# Patient Record
Sex: MALE | Race: WHITE | NOT HISPANIC OR LATINO | Employment: OTHER | URBAN - METROPOLITAN AREA
[De-identification: names, ages, dates, MRNs, and addresses within clinical notes are randomized per-mention and may not be internally consistent; named-entity substitution may affect disease eponyms.]

---

## 2017-04-25 ENCOUNTER — LAB (OUTPATIENT)
Dept: LAB | Facility: HOSPITAL | Age: 82
End: 2017-04-25

## 2017-04-25 ENCOUNTER — TRANSCRIBE ORDERS (OUTPATIENT)
Dept: LAB | Facility: HOSPITAL | Age: 82
End: 2017-04-25

## 2017-04-25 DIAGNOSIS — N18.30 CHRONIC KIDNEY DISEASE, STAGE III (MODERATE) (HCC): Primary | ICD-10-CM

## 2017-04-25 DIAGNOSIS — N18.30 CHRONIC KIDNEY DISEASE, STAGE III (MODERATE) (HCC): ICD-10-CM

## 2017-04-25 LAB
25(OH)D3 SERPL-MCNC: 39.4 NG/ML
ALBUMIN SERPL-MCNC: 4.4 G/DL (ref 3.2–4.8)
ANION GAP SERPL CALCULATED.3IONS-SCNC: 5 MMOL/L (ref 3–11)
BACTERIA UR QL AUTO: ABNORMAL /HPF
BILIRUB UR QL STRIP: NEGATIVE
BUN BLD-MCNC: 41 MG/DL (ref 9–23)
BUN/CREAT SERPL: 25.6 (ref 7–25)
CALCIUM SPEC-SCNC: 10.1 MG/DL (ref 8.7–10.4)
CHLORIDE SERPL-SCNC: 101 MMOL/L (ref 99–109)
CLARITY UR: CLEAR
CO2 SERPL-SCNC: 31 MMOL/L (ref 20–31)
COLOR UR: YELLOW
CREAT BLD-MCNC: 1.6 MG/DL (ref 0.6–1.3)
CREAT UR-MCNC: 74.8 MG/DL
GFR SERPL CREATININE-BSD FRML MDRD: 42 ML/MIN/1.73
GLUCOSE BLD-MCNC: 108 MG/DL (ref 70–100)
GLUCOSE UR STRIP-MCNC: NEGATIVE MG/DL
HGB UR QL STRIP.AUTO: ABNORMAL
HYALINE CASTS UR QL AUTO: ABNORMAL /LPF
KETONES UR QL STRIP: NEGATIVE
LEUKOCYTE ESTERASE UR QL STRIP.AUTO: NEGATIVE
NITRITE UR QL STRIP: NEGATIVE
PH UR STRIP.AUTO: <=5 [PH] (ref 5–8)
PHOSPHATE SERPL-MCNC: 3.6 MG/DL (ref 2.4–5.1)
POTASSIUM BLD-SCNC: 4.7 MMOL/L (ref 3.5–5.5)
PROT UR QL STRIP: ABNORMAL
PROT UR-MCNC: 100 MG/DL (ref 1–14)
RBC # UR: ABNORMAL /HPF
REF LAB TEST METHOD: ABNORMAL
SODIUM BLD-SCNC: 137 MMOL/L (ref 132–146)
SP GR UR STRIP: 1.01 (ref 1–1.03)
SQUAMOUS #/AREA URNS HPF: ABNORMAL /HPF
UROBILINOGEN UR QL STRIP: ABNORMAL
WBC UR QL AUTO: ABNORMAL /HPF

## 2017-04-25 PROCEDURE — 84156 ASSAY OF PROTEIN URINE: CPT | Performed by: INTERNAL MEDICINE

## 2017-04-25 PROCEDURE — 36415 COLL VENOUS BLD VENIPUNCTURE: CPT | Performed by: INTERNAL MEDICINE

## 2017-04-25 PROCEDURE — 80069 RENAL FUNCTION PANEL: CPT | Performed by: INTERNAL MEDICINE

## 2017-04-25 PROCEDURE — 82306 VITAMIN D 25 HYDROXY: CPT | Performed by: INTERNAL MEDICINE

## 2017-04-25 PROCEDURE — 83970 ASSAY OF PARATHORMONE: CPT | Performed by: INTERNAL MEDICINE

## 2017-04-25 PROCEDURE — 82570 ASSAY OF URINE CREATININE: CPT | Performed by: INTERNAL MEDICINE

## 2017-04-25 PROCEDURE — 81001 URINALYSIS AUTO W/SCOPE: CPT | Performed by: INTERNAL MEDICINE

## 2017-04-26 LAB — PTH-INTACT SERPL-MCNC: 51 PG/ML (ref 15–65)

## 2017-10-24 ENCOUNTER — LAB (OUTPATIENT)
Dept: LAB | Facility: HOSPITAL | Age: 82
End: 2017-10-24

## 2017-10-24 ENCOUNTER — TRANSCRIBE ORDERS (OUTPATIENT)
Dept: LAB | Facility: HOSPITAL | Age: 82
End: 2017-10-24

## 2017-10-24 DIAGNOSIS — E55.9 VITAMIN D DEFICIENCY: ICD-10-CM

## 2017-10-24 DIAGNOSIS — E55.9 VITAMIN D DEFICIENCY: Primary | ICD-10-CM

## 2017-10-24 LAB
ALBUMIN SERPL-MCNC: 4.3 G/DL (ref 3.2–4.8)
ANION GAP SERPL CALCULATED.3IONS-SCNC: 7 MMOL/L (ref 3–11)
BASOPHILS # BLD AUTO: 0.03 10*3/MM3 (ref 0–0.2)
BASOPHILS NFR BLD AUTO: 0.5 % (ref 0–1)
BUN BLD-MCNC: 27 MG/DL (ref 9–23)
BUN/CREAT SERPL: 18 (ref 7–25)
CALCIUM SPEC-SCNC: 9.7 MG/DL (ref 8.7–10.4)
CHLORIDE SERPL-SCNC: 102 MMOL/L (ref 99–109)
CO2 SERPL-SCNC: 27 MMOL/L (ref 20–31)
CREAT BLD-MCNC: 1.5 MG/DL (ref 0.6–1.3)
CREAT UR-MCNC: 88.1 MG/DL
DEPRECATED RDW RBC AUTO: 51.6 FL (ref 37–54)
EOSINOPHIL # BLD AUTO: 0.34 10*3/MM3 (ref 0–0.3)
EOSINOPHIL NFR BLD AUTO: 5.6 % (ref 0–3)
ERYTHROCYTE [DISTWIDTH] IN BLOOD BY AUTOMATED COUNT: 15.2 % (ref 11.3–14.5)
GFR SERPL CREATININE-BSD FRML MDRD: 45 ML/MIN/1.73
GLUCOSE BLD-MCNC: 138 MG/DL (ref 70–100)
HCT VFR BLD AUTO: 43.8 % (ref 38.9–50.9)
HGB BLD-MCNC: 14.5 G/DL (ref 13.1–17.5)
IMM GRANULOCYTES # BLD: 0.03 10*3/MM3 (ref 0–0.03)
IMM GRANULOCYTES NFR BLD: 0.5 % (ref 0–0.6)
LYMPHOCYTES # BLD AUTO: 0.86 10*3/MM3 (ref 0.6–4.8)
LYMPHOCYTES NFR BLD AUTO: 14.1 % (ref 24–44)
MCH RBC QN AUTO: 30.7 PG (ref 27–31)
MCHC RBC AUTO-ENTMCNC: 33.1 G/DL (ref 32–36)
MCV RBC AUTO: 92.6 FL (ref 80–99)
MONOCYTES # BLD AUTO: 0.51 10*3/MM3 (ref 0–1)
MONOCYTES NFR BLD AUTO: 8.3 % (ref 0–12)
NEUTROPHILS # BLD AUTO: 4.34 10*3/MM3 (ref 1.5–8.3)
NEUTROPHILS NFR BLD AUTO: 71 % (ref 41–71)
PHOSPHATE SERPL-MCNC: 3.6 MG/DL (ref 2.4–5.1)
PLATELET # BLD AUTO: 229 10*3/MM3 (ref 150–450)
PMV BLD AUTO: 11 FL (ref 6–12)
POTASSIUM BLD-SCNC: 4.9 MMOL/L (ref 3.5–5.5)
PROT UR-MCNC: 101 MG/DL (ref 1–14)
RBC # BLD AUTO: 4.73 10*6/MM3 (ref 4.2–5.76)
SODIUM BLD-SCNC: 136 MMOL/L (ref 132–146)
WBC NRBC COR # BLD: 6.11 10*3/MM3 (ref 3.5–10.8)

## 2017-10-24 PROCEDURE — 80069 RENAL FUNCTION PANEL: CPT | Performed by: INTERNAL MEDICINE

## 2017-10-24 PROCEDURE — 84156 ASSAY OF PROTEIN URINE: CPT | Performed by: INTERNAL MEDICINE

## 2017-10-24 PROCEDURE — 82570 ASSAY OF URINE CREATININE: CPT | Performed by: INTERNAL MEDICINE

## 2017-10-24 PROCEDURE — 36415 COLL VENOUS BLD VENIPUNCTURE: CPT

## 2017-10-24 PROCEDURE — 85025 COMPLETE CBC W/AUTO DIFF WBC: CPT | Performed by: INTERNAL MEDICINE

## 2017-12-27 ENCOUNTER — TRANSCRIBE ORDERS (OUTPATIENT)
Dept: LAB | Facility: HOSPITAL | Age: 82
End: 2017-12-27

## 2017-12-27 ENCOUNTER — LAB (OUTPATIENT)
Dept: LAB | Facility: HOSPITAL | Age: 82
End: 2017-12-27

## 2017-12-27 DIAGNOSIS — R80.9 PROTEINURIA, UNSPECIFIED TYPE: ICD-10-CM

## 2017-12-27 DIAGNOSIS — E55.9 VITAMIN D DEFICIENCY DISEASE: ICD-10-CM

## 2017-12-27 DIAGNOSIS — N18.30 CHRONIC KIDNEY DISEASE, STAGE III (MODERATE) (HCC): ICD-10-CM

## 2017-12-27 DIAGNOSIS — R80.9 PROTEINURIA, UNSPECIFIED TYPE: Primary | ICD-10-CM

## 2017-12-27 LAB
25(OH)D3 SERPL-MCNC: 37.9 NG/ML
ALBUMIN SERPL-MCNC: 4.1 G/DL (ref 3.2–4.8)
ANION GAP SERPL CALCULATED.3IONS-SCNC: 10 MMOL/L (ref 3–11)
BACTERIA UR QL AUTO: ABNORMAL /HPF
BASOPHILS # BLD AUTO: 0.04 10*3/MM3 (ref 0–0.2)
BASOPHILS NFR BLD AUTO: 0.6 % (ref 0–1)
BILIRUB UR QL STRIP: NEGATIVE
BUN BLD-MCNC: 41 MG/DL (ref 9–23)
BUN/CREAT SERPL: 22.8 (ref 7–25)
CALCIUM SPEC-SCNC: 9.6 MG/DL (ref 8.7–10.4)
CHLORIDE SERPL-SCNC: 101 MMOL/L (ref 99–109)
CLARITY UR: CLEAR
CO2 SERPL-SCNC: 31 MMOL/L (ref 20–31)
COLOR UR: YELLOW
CREAT BLD-MCNC: 1.8 MG/DL (ref 0.6–1.3)
CREAT UR-MCNC: 83.2 MG/DL
DEPRECATED RDW RBC AUTO: 53.8 FL (ref 37–54)
EOSINOPHIL # BLD AUTO: 0.32 10*3/MM3 (ref 0–0.3)
EOSINOPHIL NFR BLD AUTO: 4.5 % (ref 0–3)
ERYTHROCYTE [DISTWIDTH] IN BLOOD BY AUTOMATED COUNT: 15.7 % (ref 11.3–14.5)
GFR SERPL CREATININE-BSD FRML MDRD: 36 ML/MIN/1.73
GLUCOSE BLD-MCNC: 102 MG/DL (ref 70–100)
GLUCOSE UR STRIP-MCNC: NEGATIVE MG/DL
HCT VFR BLD AUTO: 45.5 % (ref 38.9–50.9)
HGB BLD-MCNC: 14.6 G/DL (ref 13.1–17.5)
HGB UR QL STRIP.AUTO: NEGATIVE
HYALINE CASTS UR QL AUTO: ABNORMAL /LPF
IMM GRANULOCYTES # BLD: 0.03 10*3/MM3 (ref 0–0.03)
IMM GRANULOCYTES NFR BLD: 0.4 % (ref 0–0.6)
KETONES UR QL STRIP: NEGATIVE
LEUKOCYTE ESTERASE UR QL STRIP.AUTO: NEGATIVE
LYMPHOCYTES # BLD AUTO: 0.94 10*3/MM3 (ref 0.6–4.8)
LYMPHOCYTES NFR BLD AUTO: 13.1 % (ref 24–44)
MCH RBC QN AUTO: 30.4 PG (ref 27–31)
MCHC RBC AUTO-ENTMCNC: 32.1 G/DL (ref 32–36)
MCV RBC AUTO: 94.8 FL (ref 80–99)
MONOCYTES # BLD AUTO: 0.59 10*3/MM3 (ref 0–1)
MONOCYTES NFR BLD AUTO: 8.2 % (ref 0–12)
NEUTROPHILS # BLD AUTO: 5.24 10*3/MM3 (ref 1.5–8.3)
NEUTROPHILS NFR BLD AUTO: 73.2 % (ref 41–71)
NITRITE UR QL STRIP: NEGATIVE
PH UR STRIP.AUTO: 6 [PH] (ref 5–8)
PHOSPHATE SERPL-MCNC: 3.9 MG/DL (ref 2.4–5.1)
PLATELET # BLD AUTO: 180 10*3/MM3 (ref 150–450)
PMV BLD AUTO: 11.3 FL (ref 6–12)
POTASSIUM BLD-SCNC: 4.4 MMOL/L (ref 3.5–5.5)
PROT UR QL STRIP: ABNORMAL
PROT UR-MCNC: 118 MG/DL (ref 1–14)
RBC # BLD AUTO: 4.8 10*6/MM3 (ref 4.2–5.76)
RBC # UR: ABNORMAL /HPF
REF LAB TEST METHOD: ABNORMAL
SODIUM BLD-SCNC: 142 MMOL/L (ref 132–146)
SP GR UR STRIP: 1.01 (ref 1–1.03)
SQUAMOUS #/AREA URNS HPF: ABNORMAL /HPF
UROBILINOGEN UR QL STRIP: ABNORMAL
WBC NRBC COR # BLD: 7.16 10*3/MM3 (ref 3.5–10.8)
WBC UR QL AUTO: ABNORMAL /HPF

## 2017-12-27 PROCEDURE — 81001 URINALYSIS AUTO W/SCOPE: CPT

## 2017-12-27 PROCEDURE — 85025 COMPLETE CBC W/AUTO DIFF WBC: CPT

## 2017-12-27 PROCEDURE — 82570 ASSAY OF URINE CREATININE: CPT

## 2017-12-27 PROCEDURE — 83970 ASSAY OF PARATHORMONE: CPT

## 2017-12-27 PROCEDURE — 80069 RENAL FUNCTION PANEL: CPT

## 2017-12-27 PROCEDURE — 36415 COLL VENOUS BLD VENIPUNCTURE: CPT

## 2017-12-27 PROCEDURE — 82306 VITAMIN D 25 HYDROXY: CPT

## 2017-12-27 PROCEDURE — 84156 ASSAY OF PROTEIN URINE: CPT

## 2017-12-28 LAB — PTH-INTACT SERPL-MCNC: 56 PG/ML (ref 15–65)

## 2018-02-06 ENCOUNTER — OFFICE VISIT (OUTPATIENT)
Dept: ORTHOPEDIC SURGERY | Facility: CLINIC | Age: 83
End: 2018-02-06

## 2018-02-06 VITALS
WEIGHT: 274.69 LBS | DIASTOLIC BLOOD PRESSURE: 105 MMHG | SYSTOLIC BLOOD PRESSURE: 168 MMHG | HEIGHT: 69 IN | BODY MASS INDEX: 40.69 KG/M2 | HEART RATE: 84 BPM

## 2018-02-06 DIAGNOSIS — M47.816 OSTEOARTHRITIS OF LUMBAR SPINE, UNSPECIFIED SPINAL OSTEOARTHRITIS COMPLICATION STATUS: Primary | ICD-10-CM

## 2018-02-06 DIAGNOSIS — Z96.641 STATUS POST TOTAL REPLACEMENT OF RIGHT HIP: ICD-10-CM

## 2018-02-06 PROCEDURE — 99214 OFFICE O/P EST MOD 30 MIN: CPT | Performed by: PHYSICIAN ASSISTANT

## 2018-02-06 RX ORDER — ZAFIRLUKAST 20 MG/1
TABLET, FILM COATED ORAL
COMMUNITY
Start: 2017-12-08 | End: 2019-03-13

## 2018-02-06 RX ORDER — LEVOTHYROXINE SODIUM 0.2 MG/1
TABLET ORAL
COMMUNITY
Start: 2017-11-10 | End: 2019-02-01 | Stop reason: SDUPTHER

## 2018-02-06 RX ORDER — ALLOPURINOL 300 MG/1
300 TABLET ORAL DAILY
COMMUNITY
End: 2019-01-28 | Stop reason: SDUPTHER

## 2018-02-06 RX ORDER — TOLTERODINE TARTRATE 2 MG/1
TABLET, EXTENDED RELEASE ORAL
COMMUNITY
Start: 2017-11-10 | End: 2019-02-18 | Stop reason: SDUPTHER

## 2018-02-06 RX ORDER — QUINAPRIL 40 MG/1
TABLET ORAL
COMMUNITY
Start: 2017-12-20 | End: 2019-04-01 | Stop reason: SDUPTHER

## 2018-02-06 RX ORDER — CHLORTHALIDONE 25 MG/1
TABLET ORAL
COMMUNITY
Start: 2017-11-18 | End: 2019-02-08 | Stop reason: SDUPTHER

## 2018-02-06 RX ORDER — LOVASTATIN 20 MG/1
20 TABLET ORAL NIGHTLY
COMMUNITY
End: 2019-02-01 | Stop reason: SDUPTHER

## 2018-02-06 RX ORDER — FLUTICASONE PROPIONATE 50 MCG
SPRAY, SUSPENSION (ML) NASAL
COMMUNITY
Start: 2017-11-28 | End: 2018-08-03 | Stop reason: SDUPTHER

## 2018-02-06 RX ORDER — METOPROLOL SUCCINATE 100 MG/1
TABLET, EXTENDED RELEASE ORAL
COMMUNITY
Start: 2017-12-29 | End: 2019-04-01 | Stop reason: SDUPTHER

## 2018-02-06 RX ORDER — METHYLPREDNISOLONE 4 MG/1
TABLET ORAL
Qty: 21 TABLET | Refills: 0 | Status: SHIPPED | OUTPATIENT
Start: 2018-02-06 | End: 2018-06-11

## 2018-02-06 RX ORDER — ATENOLOL 100 MG/1
100 TABLET ORAL DAILY
Status: ON HOLD | COMMUNITY
End: 2022-08-22 | Stop reason: SDUPTHER

## 2018-02-06 RX ORDER — RIVAROXABAN 15 MG/1
TABLET, FILM COATED ORAL
COMMUNITY
Start: 2018-01-19 | End: 2019-02-01 | Stop reason: SDUPTHER

## 2018-02-06 NOTE — PROGRESS NOTES
I have reviewed the notes, assessments, and/or procedures performed by Rosanna Rice PA-C, I concur with her documentation of Lio Griffith.

## 2018-02-06 NOTE — PROGRESS NOTES
[facility] Note    Subjective     Patient: Lio Griffith  : 1935    Primary Care Provider: Johnnie Wylie MD    Requesting Provider: As above    Follow-up (2 year follow up, 5 years status post: Right Total Hip Arthroplasty 3/12/2013)      History    Chief Complaint: Left hip/back pain    History of Present Illness: This is a very pleasant 82-year-old male presenting today to discuss his left hip pain.  He reports that his been bothering him intermittently for the past several months, but worse over the past week.  When asked to describe where his pain is he points to the lumbar spine.  He denies any groin pain, buttock pain or radiating leg pain.  He denies any numbness or tingling in the left leg.  He has pain that is worse with getting up from a seated position and sitting down.  He has night pain and difficulty lying on his back.  He has some pain with ambulating.  He reports the longer he walks the worse it gets.  He rates the pain to be signs 10/10 and describes it as sharp.  He is status post right total hip arthroplasty in  and reports no problems with the right hip.  He reports he has a history of back problems but has never seen a back doctor or had any workup on his back.  He is using a walker at this point secondary to the back pain  No current outpatient prescriptions on file prior to visit.     No current facility-administered medications on file prior to visit.       No Known Allergies   Past Medical History:   Diagnosis Date   • Diabetes      Past Surgical History:   Procedure Laterality Date   • HIP SURGERY Right 2013    Right JOYA      Family History   Problem Relation Age of Onset   • Cancer Father       Social History     Social History   • Marital status:      Spouse name: N/A   • Number of children: N/A   • Years of education: N/A     Occupational History   • Not on file.     Social History Main Topics   • Smoking status: Former Smoker  "    Packs/day: 0.50     Types: Cigarettes     Start date: 1951     Quit date: 1966   • Smokeless tobacco: Never Used   • Alcohol use No   • Drug use: No   • Sexual activity: Defer     Other Topics Concern   • Not on file     Social History Narrative   • No narrative on file        Review of Systems   Constitutional: Negative.    HENT: Positive for congestion, nosebleeds and postnasal drip.    Eyes: Negative.    Respiratory: Positive for apnea.    Cardiovascular: Negative.    Gastrointestinal: Negative.    Endocrine: Negative.    Genitourinary: Negative.    Musculoskeletal: Positive for arthralgias and back pain.   Skin: Negative.    Allergic/Immunologic: Negative.    Neurological: Negative.    Hematological: Negative.    Psychiatric/Behavioral: Negative.        The following portions of the patient's history were reviewed and updated as appropriate: allergies, current medications, past family history, past medical history, past social history, past surgical history and problem list.      Objective      Physical Exam  BP (!) 168/105  Pulse 84  Ht 176.5 cm (69.49\")  Wt 125 kg (274 lb 11.1 oz)  BMI 40 kg/m2    Body mass index is 40 kg/(m^2).    GENERAL: Body habitus: morbidly obese    Gait: using walker     Mental Status:  awake and alert; oriented to person, place, and time    Voice:  clear  SKIN:  Normal    Hair Growth:  Right:normal; Left:  normal  HEENT: Head: Normocephalic, atraumatic,  without obvious abnormality.  eye: normal external eye, no icterus   PULM:  Repiratory effort normal    Ortho Exam  Lumbar exam:  No tenderness over the lumbar spine or paraspinal muscles  Patient unable to get on exam table to lie on his back secondary to lumbar pain    Left hip exam:    RANGE OF MOTION:   FLEXION CONTRACTURE: None   FLEXION: 110 degrees   INTERNAL ROTATION: 20 degrees at 90 degrees of flexion   EXTERNAL ROTATION: 40 degrees at 90 degrees of flexion    PAIN WITH HIP MOTION: no    STRENGTH:  4/5 hip adduction, " abduction, flexion. 4/5 strength knee flexion, extension. 4/5 strength ankle dorsiflexion and plantarflexion.     GREATER TROCHANTER BURSAL PAIN:  No    SENSATION TO LIGHT TOUCH:  DEEP PERONEAL/SUPERFICIAL PERONEAL/SURAL/SAPHENOUS/TIBIAL:  intact    Right  hip    RANGE OF MOTION:   FLEXION CONTRACTURE: None   FLEXION: 110 degrees   INTERNAL ROTATION: 20 degrees at 90 degrees of flexion   EXTERNAL ROTATION: 40 degrees at 90 degrees of flexion    PAIN WITH HIP MOTION: no    STRENGTH:  5/5 hip adduction, abduction, flexion. 5/5 strength knee flexion, extension. 5/5 strength ankle dorsiflexion and plantarflexion.       SENSATION TO LIGHT TOUCH:  DEEP PERONEAL/SUPERFICIAL PERONEAL/SURAL/SAPHENOUS/TIBIAL:  intact     Medical Decision Making    Data Review:   ordered and reviewed x-rays today    Assessment:  1. Osteoarthritis of lumbar spine, unspecified spinal osteoarthritis complication status    2. Status post total replacement of right hip        Plan:  1. Severe arthritis of the lumbar spine.  I reviewed X-rays and clinical findings with the patient today.  On exam, he is nontender over the lumbar spine with no evidence of neurologic deficit.  His x-rays show severe degenerative changes with near obliteration of L2-L3 and L3-L4 degenerative changes throughout L1 to L5.  I explained to him that I do not think his hip joint is causing his pain.  I think his current pain is coming from the lumbar spine.  He has no pain with range of motion of the left hip.  I explained to the patient that often hip joint pain present as groin pain or low buttock pain.  His area of pain is definitely in the lumbar spine.  Recommendation today is a Medrol Dosepak to help decrease the inflammation.   We will refer him to a Dr. Peterson for evaluation and further treatment recommendations.  RTC prn    2.  Doing well status post right total hip replacement.  Due to today's x-rays and clinical findings with the patient.  On exam, he has good  range of motion hip with no pain.  X-rays show well-positioned right total hip arthroplasty with no evidence of osteolysis, subsidence or fracture.  He is now 5 years out of surgery and I don't see any reason that we need to follow him on an annual or biannual basis.  He'll return to see us as needed.      Rosanna Rice PA-C  02/06/18  11:37 AM

## 2018-04-25 ENCOUNTER — TRANSCRIBE ORDERS (OUTPATIENT)
Dept: LAB | Facility: HOSPITAL | Age: 83
End: 2018-04-25

## 2018-04-25 ENCOUNTER — LAB (OUTPATIENT)
Dept: LAB | Facility: HOSPITAL | Age: 83
End: 2018-04-25

## 2018-04-25 DIAGNOSIS — E55.9 AVITAMINOSIS D: Primary | ICD-10-CM

## 2018-04-25 DIAGNOSIS — E55.9 AVITAMINOSIS D: ICD-10-CM

## 2018-04-25 LAB
ALBUMIN SERPL-MCNC: 4.7 G/DL (ref 3.2–4.8)
ANION GAP SERPL CALCULATED.3IONS-SCNC: 7 MMOL/L (ref 3–11)
BUN BLD-MCNC: 42 MG/DL (ref 9–23)
BUN/CREAT SERPL: 21 (ref 7–25)
CALCIUM SPEC-SCNC: 10.6 MG/DL (ref 8.7–10.4)
CHLORIDE SERPL-SCNC: 103 MMOL/L (ref 99–109)
CO2 SERPL-SCNC: 30 MMOL/L (ref 20–31)
CREAT BLD-MCNC: 2 MG/DL (ref 0.6–1.3)
CREAT UR-MCNC: 63.7 MG/DL
GFR SERPL CREATININE-BSD FRML MDRD: 32 ML/MIN/1.73
GLUCOSE BLD-MCNC: 139 MG/DL (ref 70–100)
PHOSPHATE SERPL-MCNC: 3.9 MG/DL (ref 2.4–5.1)
POTASSIUM BLD-SCNC: 4.9 MMOL/L (ref 3.5–5.5)
PROT UR-MCNC: 67 MG/DL (ref 1–14)
SODIUM BLD-SCNC: 140 MMOL/L (ref 132–146)

## 2018-04-25 PROCEDURE — 36415 COLL VENOUS BLD VENIPUNCTURE: CPT

## 2018-04-25 PROCEDURE — 80069 RENAL FUNCTION PANEL: CPT

## 2018-04-25 PROCEDURE — 84156 ASSAY OF PROTEIN URINE: CPT

## 2018-04-25 PROCEDURE — 82570 ASSAY OF URINE CREATININE: CPT

## 2018-06-11 ENCOUNTER — OFFICE VISIT (OUTPATIENT)
Dept: FAMILY MEDICINE CLINIC | Facility: CLINIC | Age: 83
End: 2018-06-11

## 2018-06-11 VITALS
HEIGHT: 69 IN | HEART RATE: 71 BPM | SYSTOLIC BLOOD PRESSURE: 138 MMHG | BODY MASS INDEX: 40.79 KG/M2 | WEIGHT: 275.4 LBS | DIASTOLIC BLOOD PRESSURE: 88 MMHG | OXYGEN SATURATION: 98 %

## 2018-06-11 DIAGNOSIS — I48.20 CHRONIC ATRIAL FIBRILLATION (HCC): ICD-10-CM

## 2018-06-11 DIAGNOSIS — M54.50 CHRONIC LOW BACK PAIN WITHOUT SCIATICA, UNSPECIFIED BACK PAIN LATERALITY: ICD-10-CM

## 2018-06-11 DIAGNOSIS — G89.29 CHRONIC LOW BACK PAIN WITHOUT SCIATICA, UNSPECIFIED BACK PAIN LATERALITY: ICD-10-CM

## 2018-06-11 DIAGNOSIS — I10 ESSENTIAL HYPERTENSION: ICD-10-CM

## 2018-06-11 DIAGNOSIS — E11.21 TYPE 2 DIABETES MELLITUS WITH DIABETIC NEPHROPATHY, WITHOUT LONG-TERM CURRENT USE OF INSULIN (HCC): Primary | ICD-10-CM

## 2018-06-11 PROCEDURE — 99213 OFFICE O/P EST LOW 20 MIN: CPT | Performed by: PHYSICIAN ASSISTANT

## 2018-06-11 RX ORDER — MELATONIN
2000 DAILY
COMMUNITY

## 2018-06-11 NOTE — PROGRESS NOTES
I have reviewed the notes, assessments, and/or procedures performed by SONYA Alegria, I concur with her/his documentation of Lio Griffith.

## 2018-06-11 NOTE — PROGRESS NOTES
"Reason for visit    Lio Griffith is a 82 y.o. male who presents to establish care with me.     Chief Complaint   Patient presents with   • Establish Care       HPI     He is a former patient of Dr. Wylie and is relatively new to my care. Recently had physical and labwork in March at Dr. Wylie office (data deficit). Has history of thyroid cancer s/p thyroidectomy in the 60s, remote kidney stone, chronic atrial fibrillation anticoagulated on Xarelto, hypertension, low back \"arthritis\", and controlled diabetes with CKD. He denies any concerns today. His back pain has been much better after physical therapy in the last 2 months.       Past Medical History:   Diagnosis Date   • Cancer    • Hypertension    • Hypothyroidism    • Kidney stone        Past Surgical History:   Procedure Laterality Date   • ADENOIDECTOMY     • APPENDECTOMY     • HIP SURGERY Right 03/12/2013    Right JOYA    • JOINT REPLACEMENT     • THYROIDECTOMY     • TONSILLECTOMY     • VITRECTOMY PARS PLANA W/ REPAIR OF MACULAR HOLE         Family History   Problem Relation Age of Onset   • Cancer Father    • Diabetes Brother    • Hypertension Brother    • Obesity Brother        Social History     Social History   • Marital status:      Spouse name: N/A   • Number of children: N/A   • Years of education: N/A     Occupational History   • Not on file.     Social History Main Topics   • Smoking status: Former Smoker     Packs/day: 0.50     Types: Cigarettes     Start date: 1951     Quit date: 1966   • Smokeless tobacco: Never Used   • Alcohol use No   • Drug use: No   • Sexual activity: Defer     Other Topics Concern   • Not on file     Social History Narrative   • No narrative on file       No Known Allergies    ROS    Review of Systems   Constitutional: Negative for fatigue.   Respiratory: Negative for shortness of breath.    Cardiovascular: Negative for chest pain, palpitations and leg swelling.   Gastrointestinal: Negative for blood in stool. "   Genitourinary: Negative for hematuria.   Neurological: Negative for syncope and light-headedness.       Vitals:    06/11/18 1359   BP: 138/88   Pulse: 71   SpO2: 98%         Current Outpatient Prescriptions:   •  allopurinol (ZYLOPRIM) 300 MG tablet, Take 300 mg by mouth Daily., Disp: , Rfl:   •  atenolol (TENORMIN) 100 MG tablet, Take 100 mg by mouth Daily., Disp: , Rfl:   •  chlorthalidone (HYGROTON) 25 MG tablet, , Disp: , Rfl:   •  cholecalciferol (VITAMIN D3) 1000 units tablet, Take 2,000 Units by mouth Daily., Disp: , Rfl:   •  fluticasone (FLONASE) 50 MCG/ACT nasal spray, , Disp: , Rfl:   •  levothyroxine (SYNTHROID, LEVOTHROID) 200 MCG tablet, , Disp: , Rfl:   •  lovastatin (MEVACOR) 20 MG tablet, Take 20 mg by mouth Every Night., Disp: , Rfl:   •  metFORMIN (GLUCOPHAGE) 1000 MG tablet, , Disp: , Rfl:   •  metoprolol succinate XL (TOPROL-XL) 100 MG 24 hr tablet, , Disp: , Rfl:   •  Multiple Vitamins-Minerals (MULTIVITAMIN ADULT PO), Take  by mouth., Disp: , Rfl:   •  quinapril (ACCUPRIL) 40 MG tablet, , Disp: , Rfl:   •  tolterodine (DETROL) 2 MG tablet, , Disp: , Rfl:   •  XARELTO 15 MG tablet, , Disp: , Rfl:   •  zafirlukast (ACCOLATE) 20 MG tablet, , Disp: , Rfl:     PE    Physical Exam   Constitutional: He appears well-developed and well-nourished. No distress.   Obese.    HENT:   Head: Normocephalic.   Hoarse voice.    Cardiovascular: Normal rate.  Exam reveals no friction rub.    No murmur heard.  Irregularly irregular rhythm.    Pulmonary/Chest: Effort normal and breath sounds normal. No respiratory distress. He has no wheezes. He has no rales.   Abdominal:   Abdominal panniculus   Musculoskeletal:   1+ ankle pitting edema   Skin: Skin is warm and dry. He is not diaphoretic.   Psychiatric: He has a normal mood and affect. His behavior is normal.       A/P    Problem List Items Addressed This Visit     Hypertension    Overview     Controlled.          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 Good control. Will request records from Dr. Wylie office.          Relevant Medications    metFORMIN (GLUCOPHAGE) 1000 MG tablet    chlorthalidone (HYGROTON) 25 MG tablet    Chronic low back pain    Overview     OA per pt. Improved after PT 4/2018         Chronic atrial fibrillation    Overview     Rate-controlled, anticoagulated. Presently asymptomatic. Continue present management.          Relevant Medications    metoprolol succinate XL (TOPROL-XL) 100 MG 24 hr tablet    atenolol (TENORMIN) 100 MG tablet          Plan of care reviewed with patient at the conclusion of today's visit. Education was provided regarding diagnosis, management and any prescribed or recommended OTC medications.  Patient verbalizes understanding of and agreement with management plan.        SONYA Goldberg

## 2018-06-29 DIAGNOSIS — E11.21 TYPE 2 DIABETES MELLITUS WITH DIABETIC NEPHROPATHY, WITHOUT LONG-TERM CURRENT USE OF INSULIN (HCC): Primary | ICD-10-CM

## 2018-06-29 NOTE — TELEPHONE ENCOUNTER
Still don't have records of A1C/labs from Dr. Wylie in March. If pt still has some metformin remaining, prefer we monitor bmp, A1C prior to refill. See if pt can go by lab within 1 week.   OK for 30d supply metformin at current dose if needed to last until we can get lab results

## 2018-06-29 NOTE — TELEPHONE ENCOUNTER
PER PT REQUESTING REFILL OF:  METFORMIN 1000 MG 1 A DAY 90 DAY SUPPLY    [facility] [phone]

## 2018-07-11 ENCOUNTER — LAB (OUTPATIENT)
Dept: LAB | Facility: HOSPITAL | Age: 83
End: 2018-07-11

## 2018-07-11 DIAGNOSIS — E11.21 TYPE 2 DIABETES MELLITUS WITH DIABETIC NEPHROPATHY, WITHOUT LONG-TERM CURRENT USE OF INSULIN (HCC): ICD-10-CM

## 2018-07-11 LAB
ANION GAP SERPL CALCULATED.3IONS-SCNC: 8 MMOL/L (ref 3–11)
BUN BLD-MCNC: 31 MG/DL (ref 9–23)
BUN/CREAT SERPL: 15.3 (ref 7–25)
CALCIUM SPEC-SCNC: 9.9 MG/DL (ref 8.7–10.4)
CHLORIDE SERPL-SCNC: 105 MMOL/L (ref 99–109)
CO2 SERPL-SCNC: 26 MMOL/L (ref 20–31)
CREAT BLD-MCNC: 2.03 MG/DL (ref 0.6–1.3)
GFR SERPL CREATININE-BSD FRML MDRD: 32 ML/MIN/1.73
GLUCOSE BLD-MCNC: 145 MG/DL (ref 70–100)
HBA1C MFR BLD: 6.8 % (ref 4.8–5.6)
POTASSIUM BLD-SCNC: 4.5 MMOL/L (ref 3.5–5.5)
SODIUM BLD-SCNC: 139 MMOL/L (ref 132–146)

## 2018-07-11 PROCEDURE — 80048 BASIC METABOLIC PNL TOTAL CA: CPT

## 2018-07-11 PROCEDURE — 36415 COLL VENOUS BLD VENIPUNCTURE: CPT

## 2018-07-11 PROCEDURE — 83036 HEMOGLOBIN GLYCOSYLATED A1C: CPT

## 2018-07-12 ENCOUNTER — TELEPHONE (OUTPATIENT)
Dept: FAMILY MEDICINE CLINIC | Facility: CLINIC | Age: 83
End: 2018-07-12

## 2018-07-12 NOTE — TELEPHONE ENCOUNTER
Called and spoke to patients wife, Advised per Slade for results on labs, escribed metformin to his mail order pharmacy.                            ----- Message from Gema Taylor MA sent at 7/11/2018  2:23 PM EDT -----  Labs forwarded to Dr Hannah

## 2018-08-03 ENCOUNTER — TELEPHONE (OUTPATIENT)
Dept: FAMILY MEDICINE CLINIC | Facility: CLINIC | Age: 83
End: 2018-08-03

## 2018-08-03 RX ORDER — FLUTICASONE PROPIONATE 50 MCG
2 SPRAY, SUSPENSION (ML) NASAL DAILY
Qty: 1 BOTTLE | Refills: 1 | Status: SHIPPED | OUTPATIENT
Start: 2018-08-03 | End: 2018-11-09 | Stop reason: SDUPTHER

## 2018-08-03 NOTE — TELEPHONE ENCOUNTER
PT NEEDING REFILL FOR THE FOLLOWING:  FLUTICASONE 50 MCG/ACT NASAL SPRAY 90 DAY SUPPLY  PLEASE SEND TO [facility]

## 2018-08-20 ENCOUNTER — TELEPHONE (OUTPATIENT)
Dept: FAMILY MEDICINE CLINIC | Facility: CLINIC | Age: 83
End: 2018-08-20

## 2018-08-20 NOTE — TELEPHONE ENCOUNTER
I have not seen the form and it is not in my inbox. This is one of the forms a physician would have to sign for him. When we locate, would place in Dr. Pulido' box to sign.

## 2018-08-27 NOTE — TELEPHONE ENCOUNTER
PT CALLED AGAIN ASKING IF HIS FORMS HAVE BEEN SIGNED. PT SAID HE HASN'T RECEIVED THEM.     PLEASE CALL PT BACK [phone]

## 2018-09-12 ENCOUNTER — OFFICE VISIT (OUTPATIENT)
Dept: FAMILY MEDICINE CLINIC | Facility: CLINIC | Age: 83
End: 2018-09-12

## 2018-09-12 VITALS
HEART RATE: 83 BPM | TEMPERATURE: 98 F | BODY MASS INDEX: 40.11 KG/M2 | HEIGHT: 69 IN | SYSTOLIC BLOOD PRESSURE: 172 MMHG | DIASTOLIC BLOOD PRESSURE: 98 MMHG | WEIGHT: 270.8 LBS | OXYGEN SATURATION: 92 %

## 2018-09-12 DIAGNOSIS — E78.00 HYPERCHOLESTEROLEMIA: ICD-10-CM

## 2018-09-12 DIAGNOSIS — E11.21 TYPE 2 DIABETES MELLITUS WITH DIABETIC NEPHROPATHY, WITHOUT LONG-TERM CURRENT USE OF INSULIN (HCC): Primary | ICD-10-CM

## 2018-09-12 DIAGNOSIS — I10 ESSENTIAL HYPERTENSION: ICD-10-CM

## 2018-09-12 DIAGNOSIS — I48.20 CHRONIC ATRIAL FIBRILLATION (HCC): ICD-10-CM

## 2018-09-12 PROCEDURE — 99214 OFFICE O/P EST MOD 30 MIN: CPT | Performed by: PHYSICIAN ASSISTANT

## 2018-09-12 NOTE — PROGRESS NOTES
Chief Complaint   Patient presents with   • Follow-up for diabetes, hypertension, CKD, afib       HPI     Lio Griffith is a pleasant 82 y.o. male who is here for 3 month follow-up of his chronic conditions. Patient states last night his blood pressure at home was 142/84. He states it is always higher in the office. He reports home BP average in 130s/80s. Diabetes has been under good control with metformin 1,000 mg qd. He denies hypoglycemia. Sees eye doctor every 3-4 months, hx eye surgery few years ago. Denies any foot sores/wounds or extremity paresthesias. No myalgias on lovastatin. He does some home stretching and upper body strengthening but gets no aerobic exercise. Feels he eats a healthy diet. He has lost 5 pounds since his last visit by watching his diet closely. Sees nephrologist in November for f/u of CKD. He denies exertional limitation, palpitations, chest pain, or lightheadedness. No bleeding issues on xarelto.     Past Medical History:   Diagnosis Date   • Cancer (CMS/HCC)    • Hypertension    • Hypothyroidism    • Kidney stone        Past Surgical History:   Procedure Laterality Date   • ADENOIDECTOMY     • APPENDECTOMY     • HIP SURGERY Right 03/12/2013    Right JOYA    • JOINT REPLACEMENT     • THYROIDECTOMY     • TONSILLECTOMY     • VITRECTOMY PARS PLANA W/ REPAIR OF MACULAR HOLE         Family History   Problem Relation Age of Onset   • Cancer Father    • Diabetes Brother    • Hypertension Brother    • Obesity Brother        Social History     Social History   • Marital status:      Spouse name: N/A   • Number of children: N/A   • Years of education: N/A     Occupational History   • Not on file.     Social History Main Topics   • Smoking status: Former Smoker     Packs/day: 0.50     Types: Cigarettes     Start date: 1951     Quit date: 1966   • Smokeless tobacco: Never Used   • Alcohol use No   • Drug use: No   • Sexual activity: Defer     Other Topics Concern   • Not on file     Social  History Narrative   • No narrative on file       No Known Allergies    ROS    Review of Systems   Constitutional: Negative for diaphoresis.   Respiratory: Negative for shortness of breath.    Cardiovascular: Negative for chest pain and palpitations.   Gastrointestinal: Negative for nausea.   Musculoskeletal: Negative for myalgias.   Neurological: Negative for dizziness, syncope and light-headedness.       Vitals:    09/12/18 1003   BP: 172/98   Pulse: 83   Temp: 98 °F (36.7 °C)   SpO2: 92%         Current Outpatient Prescriptions:   •  allopurinol (ZYLOPRIM) 300 MG tablet, Take 300 mg by mouth Daily., Disp: , Rfl:   •  atenolol (TENORMIN) 100 MG tablet, Take 100 mg by mouth Daily., Disp: , Rfl:   •  chlorthalidone (HYGROTON) 25 MG tablet, , Disp: , Rfl:   •  cholecalciferol (VITAMIN D3) 1000 units tablet, Take 2,000 Units by mouth Daily., Disp: , Rfl:   •  fluticasone (FLONASE) 50 MCG/ACT nasal spray, 2 sprays into the nostril(s) as directed by provider Daily., Disp: 1 bottle, Rfl: 1  •  levothyroxine (SYNTHROID, LEVOTHROID) 200 MCG tablet, , Disp: , Rfl:   •  lovastatin (MEVACOR) 20 MG tablet, Take 20 mg by mouth Every Night., Disp: , Rfl:   •  metFORMIN (GLUCOPHAGE) 1000 MG tablet, Take 1 tablet by mouth Daily With Breakfast., Disp: 90 tablet, Rfl: 2  •  metoprolol succinate XL (TOPROL-XL) 100 MG 24 hr tablet, , Disp: , Rfl:   •  Multiple Vitamins-Minerals (MULTIVITAMIN ADULT PO), Take  by mouth., Disp: , Rfl:   •  quinapril (ACCUPRIL) 40 MG tablet, , Disp: , Rfl:   •  tolterodine (DETROL) 2 MG tablet, , Disp: , Rfl:   •  XARELTO 15 MG tablet, , Disp: , Rfl:   •  zafirlukast (ACCOLATE) 20 MG tablet, , Disp: , Rfl:     PE    Physical Exam   Constitutional: He appears well-developed. No distress.   Morbidly obese   HENT:   Head: Normocephalic.   Cardiovascular: Normal rate, regular rhythm and normal heart sounds.    No murmur heard.  Pulses:       Dorsalis pedis pulses are 2+ on the right side, and 2+ on the left  side.   Pulmonary/Chest: Effort normal and breath sounds normal. He has no wheezes. He has no rales.    Lio had a diabetic foot exam performed today.   During the foot exam he had a monofilament test performed.    Neurological Sensory Findings -  Unaltered sharp/dull right ankle/foot discrimination and unaltered sharp/dull left ankle/foot discrimination.  Skin Integrity  -  His right foot skin is intact.  He has no right foot ulcer.His left foot skin is intact. He has no left foot ulcer..  Neurological: He is alert.   Psychiatric: He has a normal mood and affect. His behavior is normal.   Vitals reviewed.      Results    Results for orders placed or performed in visit on 07/11/18   Hemoglobin A1c   Result Value Ref Range    Hemoglobin A1C 6.80 (H) 4.80 - 5.60 %   Basic Metabolic Panel   Result Value Ref Range    Glucose 145 (H) 70 - 100 mg/dL    BUN 31 (H) 9 - 23 mg/dL    Creatinine 2.03 (H) 0.60 - 1.30 mg/dL    Sodium 139 132 - 146 mmol/L    Potassium 4.5 3.5 - 5.5 mmol/L    Chloride 105 99 - 109 mmol/L    CO2 26.0 20.0 - 31.0 mmol/L    Calcium 9.9 8.7 - 10.4 mg/dL    eGFR Non African Amer 32 (L) >60 mL/min/1.73    BUN/Creatinine Ratio 15.3 7.0 - 25.0    Anion Gap 8.0 3.0 - 11.0 mmol/L       A/P    Problem List Items Addressed This Visit        Cardiovascular and Mediastinum    Hypertension    Overview     -Elevated in office today but controlled by home measurements. Continue home monitoring.          Chronic atrial fibrillation (CMS/[facility])    Overview     Sinus rhythm today. Anticoagulated. Presently asymptomatic. Continue present management.          Hypercholesterolemia  -On statin therapy. Due for lab monitoring    Relevant Orders    Comprehensive Metabolic Panel    Lipid Panel       Endocrine    Type 2 diabetes mellitus with renal complication (CMS/HCC) - Primary    Overview     -A1C 6.8% 7/2018. Good control. Follow-up today.   -Need for ongoing weight loss and lifestyle improvement discussed.  I  encouraged his progress with diet  -Still no records received to date from [facility]. Request records release again today.         Relevant Orders    Hemoglobin A1c          Plan of care reviewed with patient at the conclusion of today's visit. Education was provided regarding diagnosis, management and any prescribed or recommended OTC medications.  Patient verbalizes understanding of and agreement with management plan.        SONYA Goldberg

## 2018-09-14 ENCOUNTER — LAB (OUTPATIENT)
Dept: LAB | Facility: HOSPITAL | Age: 83
End: 2018-09-14

## 2018-09-14 DIAGNOSIS — E11.21 TYPE 2 DIABETES MELLITUS WITH DIABETIC NEPHROPATHY, WITHOUT LONG-TERM CURRENT USE OF INSULIN (HCC): ICD-10-CM

## 2018-09-14 DIAGNOSIS — E78.00 HYPERCHOLESTEROLEMIA: ICD-10-CM

## 2018-09-14 LAB
ALBUMIN SERPL-MCNC: 4.29 G/DL (ref 3.2–4.8)
ALBUMIN/GLOB SERPL: 1.9 G/DL (ref 1.5–2.5)
ALP SERPL-CCNC: 122 U/L (ref 25–100)
ALT SERPL W P-5'-P-CCNC: 29 U/L (ref 7–40)
ANION GAP SERPL CALCULATED.3IONS-SCNC: 8 MMOL/L (ref 3–11)
ARTICHOKE IGE QN: 84 MG/DL (ref 0–130)
AST SERPL-CCNC: 25 U/L (ref 0–33)
BILIRUB SERPL-MCNC: 1.4 MG/DL (ref 0.3–1.2)
BUN BLD-MCNC: 39 MG/DL (ref 9–23)
BUN/CREAT SERPL: 18.6 (ref 7–25)
CALCIUM SPEC-SCNC: 9.7 MG/DL (ref 8.7–10.4)
CHLORIDE SERPL-SCNC: 103 MMOL/L (ref 99–109)
CHOLEST SERPL-MCNC: 128 MG/DL (ref 0–200)
CO2 SERPL-SCNC: 29 MMOL/L (ref 20–31)
CREAT BLD-MCNC: 2.1 MG/DL (ref 0.6–1.3)
GFR SERPL CREATININE-BSD FRML MDRD: 31 ML/MIN/1.73
GLOBULIN UR ELPH-MCNC: 2.2 GM/DL
GLUCOSE BLD-MCNC: 113 MG/DL (ref 70–100)
HBA1C MFR BLD: 6.5 % (ref 4.8–5.6)
HDLC SERPL-MCNC: 40 MG/DL (ref 40–60)
POTASSIUM BLD-SCNC: 4.3 MMOL/L (ref 3.5–5.5)
PROT SERPL-MCNC: 6.5 G/DL (ref 5.7–8.2)
SODIUM BLD-SCNC: 140 MMOL/L (ref 132–146)
TRIGL SERPL-MCNC: 71 MG/DL (ref 0–150)

## 2018-09-14 PROCEDURE — 80061 LIPID PANEL: CPT | Performed by: PHYSICIAN ASSISTANT

## 2018-09-14 PROCEDURE — 80053 COMPREHEN METABOLIC PANEL: CPT | Performed by: PHYSICIAN ASSISTANT

## 2018-09-14 PROCEDURE — 83036 HEMOGLOBIN GLYCOSYLATED A1C: CPT | Performed by: PHYSICIAN ASSISTANT

## 2018-09-14 PROCEDURE — 36415 COLL VENOUS BLD VENIPUNCTURE: CPT

## 2018-09-28 PROBLEM — E11.3213 BOTH EYES AFFECTED BY MILD NONPROLIFERATIVE DIABETIC RETINOPATHY WITH MACULAR EDEMA, ASSOCIATED WITH TYPE 2 DIABETES MELLITUS: Status: ACTIVE | Noted: 2018-09-28

## 2018-10-26 ENCOUNTER — TELEPHONE (OUTPATIENT)
Dept: FAMILY MEDICINE CLINIC | Facility: CLINIC | Age: 83
End: 2018-10-26

## 2018-10-26 DIAGNOSIS — G47.33 OSA (OBSTRUCTIVE SLEEP APNEA): Primary | ICD-10-CM

## 2018-10-31 ENCOUNTER — TRANSCRIBE ORDERS (OUTPATIENT)
Dept: LAB | Facility: HOSPITAL | Age: 83
End: 2018-10-31

## 2018-10-31 ENCOUNTER — LAB (OUTPATIENT)
Dept: LAB | Facility: HOSPITAL | Age: 83
End: 2018-10-31

## 2018-10-31 DIAGNOSIS — N18.30 CHRONIC KIDNEY DISEASE, STAGE III (MODERATE) (HCC): Primary | ICD-10-CM

## 2018-10-31 DIAGNOSIS — N18.30 CHRONIC KIDNEY DISEASE, STAGE III (MODERATE) (HCC): ICD-10-CM

## 2018-10-31 LAB
ALBUMIN SERPL-MCNC: 4.37 G/DL (ref 3.2–4.8)
ANION GAP SERPL CALCULATED.3IONS-SCNC: 9 MMOL/L (ref 3–11)
BUN BLD-MCNC: 41 MG/DL (ref 9–23)
BUN/CREAT SERPL: 18.6 (ref 7–25)
CALCIUM SPEC-SCNC: 10.1 MG/DL (ref 8.7–10.4)
CHLORIDE SERPL-SCNC: 100 MMOL/L (ref 99–109)
CO2 SERPL-SCNC: 30 MMOL/L (ref 20–31)
CREAT BLD-MCNC: 2.2 MG/DL (ref 0.6–1.3)
CREAT UR-MCNC: 113.1 MG/DL
GFR SERPL CREATININE-BSD FRML MDRD: 29 ML/MIN/1.73
GLUCOSE BLD-MCNC: 117 MG/DL (ref 70–100)
PHOSPHATE SERPL-MCNC: 4.5 MG/DL (ref 2.4–5.1)
POTASSIUM BLD-SCNC: 4.5 MMOL/L (ref 3.5–5.5)
PROT UR-MCNC: 124 MG/DL (ref 1–14)
SODIUM BLD-SCNC: 139 MMOL/L (ref 132–146)

## 2018-10-31 PROCEDURE — 82570 ASSAY OF URINE CREATININE: CPT

## 2018-10-31 PROCEDURE — 36415 COLL VENOUS BLD VENIPUNCTURE: CPT

## 2018-10-31 PROCEDURE — 80069 RENAL FUNCTION PANEL: CPT

## 2018-10-31 PROCEDURE — 84156 ASSAY OF PROTEIN URINE: CPT

## 2018-11-07 ENCOUNTER — TELEPHONE (OUTPATIENT)
Dept: FAMILY MEDICINE CLINIC | Facility: CLINIC | Age: 83
End: 2018-11-07

## 2018-11-07 NOTE — TELEPHONE ENCOUNTER
Spoke with pt and explained to him that GFR dropped below 30. Told him to stop Metformin per Slade. Scheduled him an appt on Monday 9/12 with Slade to discuss diabetes management. Understanding was verbalized.

## 2018-11-07 NOTE — TELEPHONE ENCOUNTER
Spoke with Dr. Leon. Pt's GFR dropped below 30.     Please advise Mr. Griffith to stop metformin due to his kidney function. Needs appointment within 1 week to discuss other treatment options for his diabetes.

## 2018-11-07 NOTE — TELEPHONE ENCOUNTER
PT IS CURRENTLY BEING SEEN AT A KIDNEY DOCTOR OFFICE, THE DOCTOR HIM SELF HAS CONTACTED US TO SPEAK WITH AQUILINO SOON AS POSSIBLE.     PHONE: [phone]

## 2018-11-09 ENCOUNTER — TELEPHONE (OUTPATIENT)
Dept: FAMILY MEDICINE CLINIC | Facility: CLINIC | Age: 83
End: 2018-11-09

## 2018-11-09 RX ORDER — FLUTICASONE PROPIONATE 50 MCG
2 SPRAY, SUSPENSION (ML) NASAL DAILY
Qty: 1 BOTTLE | Refills: 5 | Status: SHIPPED | OUTPATIENT
Start: 2018-11-09

## 2018-11-12 ENCOUNTER — OFFICE VISIT (OUTPATIENT)
Dept: FAMILY MEDICINE CLINIC | Facility: CLINIC | Age: 83
End: 2018-11-12

## 2018-11-12 VITALS
DIASTOLIC BLOOD PRESSURE: 82 MMHG | BODY MASS INDEX: 39.13 KG/M2 | TEMPERATURE: 97.4 F | WEIGHT: 264.2 LBS | OXYGEN SATURATION: 98 % | SYSTOLIC BLOOD PRESSURE: 144 MMHG | HEART RATE: 75 BPM | HEIGHT: 69 IN

## 2018-11-12 DIAGNOSIS — E11.21 TYPE 2 DIABETES MELLITUS WITH DIABETIC NEPHROPATHY, WITHOUT LONG-TERM CURRENT USE OF INSULIN (HCC): Primary | ICD-10-CM

## 2018-11-12 LAB — GLUCOSE BLDC GLUCOMTR-MCNC: 115 MG/DL (ref 70–130)

## 2018-11-12 PROCEDURE — 99213 OFFICE O/P EST LOW 20 MIN: CPT | Performed by: PHYSICIAN ASSISTANT

## 2018-11-12 PROCEDURE — 82962 GLUCOSE BLOOD TEST: CPT | Performed by: PHYSICIAN ASSISTANT

## 2018-11-12 RX ORDER — BLOOD-GLUCOSE METER
1 KIT MISCELLANEOUS
Qty: 1 EACH | Refills: 0 | Status: SHIPPED | OUTPATIENT
Start: 2018-11-12

## 2018-11-12 RX ORDER — LANCETS 33 GAUGE
EACH MISCELLANEOUS
Qty: 100 EACH | Refills: 3 | Status: SHIPPED | OUTPATIENT
Start: 2018-11-12

## 2018-11-12 NOTE — PATIENT INSTRUCTIONS
-Measure blood sugars fasting once daily before breakfast for 1-2 weeks, then call office with readings.

## 2018-11-12 NOTE — PROGRESS NOTES
Chief Complaint   Patient presents with   • Diabetes     medication check up       HPI     Lio Griffith is a pleasant 83 y.o. male who is here for follow-up of diabetes. Accompanied by wife. Patient's metformin was recently discontinued last week after his GFR recently dropped below 30. He is here to discuss alternative treatment options. Has never monitored home glucoses due to history of good diabetic control. Remote history of thyroid cancer in 1960s but he is unsure of type (data deficit).     Past Medical History:   Diagnosis Date   • Cancer (CMS/HCC)    • Hypertension    • Hypothyroidism    • Kidney stone        Past Surgical History:   Procedure Laterality Date   • ADENOIDECTOMY     • APPENDECTOMY     • HIP SURGERY Right 2013    Right JOYA    • JOINT REPLACEMENT     • THYROIDECTOMY     • TONSILLECTOMY     • VITRECTOMY PARS PLANA W/ REPAIR OF MACULAR HOLE         Family History   Problem Relation Age of Onset   • Cancer Father    • Diabetes Brother    • Hypertension Brother    • Obesity Brother        Social History     Socioeconomic History   • Marital status:      Spouse name: Not on file   • Number of children: Not on file   • Years of education: Not on file   • Highest education level: Not on file   Social Needs   • Financial resource strain: Not on file   • Food insecurity - worry: Not on file   • Food insecurity - inability: Not on file   • Transportation needs - medical: Not on file   • Transportation needs - non-medical: Not on file   Occupational History   • Not on file   Tobacco Use   • Smoking status: Former Smoker     Packs/day: 0.50     Types: Cigarettes     Start date:      Last attempt to quit: 1966     Years since quittin.8   • Smokeless tobacco: Never Used   Substance and Sexual Activity   • Alcohol use: No   • Drug use: No   • Sexual activity: Defer   Other Topics Concern   • Not on file   Social History Narrative   • Not on file       No Known  Allergies    ROS    Review of Systems   Constitutional: Negative for diaphoresis.   Gastrointestinal: Negative for nausea.   Neurological: Negative for dizziness and light-headedness.       Vitals:    11/12/18 1126   BP: 144/82   Pulse: 75   Temp: 97.4 °F (36.3 °C)   SpO2: 98%         Current Outpatient Medications:   •  allopurinol (ZYLOPRIM) 300 MG tablet, Take 300 mg by mouth Daily., Disp: , Rfl:   •  atenolol (TENORMIN) 100 MG tablet, Take 100 mg by mouth Daily., Disp: , Rfl:   •  chlorthalidone (HYGROTON) 25 MG tablet, , Disp: , Rfl:   •  cholecalciferol (VITAMIN D3) 1000 units tablet, Take 2,000 Units by mouth Daily., Disp: , Rfl:   •  fluticasone (FLONASE) 50 MCG/ACT nasal spray, 2 sprays into the nostril(s) as directed by provider Daily., Disp: 1 bottle, Rfl: 5  •  levothyroxine (SYNTHROID, LEVOTHROID) 200 MCG tablet, , Disp: , Rfl:   •  lovastatin (MEVACOR) 20 MG tablet, Take 20 mg by mouth Every Night., Disp: , Rfl:   •  metoprolol succinate XL (TOPROL-XL) 100 MG 24 hr tablet, , Disp: , Rfl:   •  Multiple Vitamins-Minerals (MULTIVITAMIN ADULT PO), Take  by mouth., Disp: , Rfl:   •  quinapril (ACCUPRIL) 40 MG tablet, , Disp: , Rfl:   •  tolterodine (DETROL) 2 MG tablet, , Disp: , Rfl:   •  XARELTO 15 MG tablet, , Disp: , Rfl:   •  zafirlukast (ACCOLATE) 20 MG tablet, , Disp: , Rfl:   •  glucose blood test strip, Monitor glucose once daily before breakfast., Disp: 100 each, Rfl: 3  •  glucose monitor monitoring kit, 1 each Every Morning Before Breakfast., Disp: 1 each, Rfl: 0  •  ONETOUCH DELICA LANCETS 33G misc, Use as directed once daily., Disp: 100 each, Rfl: 3    PE    Physical Exam   Constitutional: He appears well-developed and well-nourished. No distress.   Obese   HENT:   Head: Normocephalic.   Cardiovascular: Normal rate, regular rhythm and normal heart sounds.   No murmur heard.  Pulmonary/Chest: Effort normal and breath sounds normal. He has no wheezes. He has no rales.   Neurological: He is  alert.   Psychiatric: He has a normal mood and affect. His behavior is normal.   Vitals reviewed.      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 FSBG 115 today off metformin  -Patient was given diabetic testing supplies. Advised to monitor home glucoses for 1-2 weeks and call office with readings at that time.   -Discussed diabetic diet.   -No medical records received to date from previous PCP. [facility] We will attempt to obtain medical records and clarify type of thyroid cancer. May be candidate for GLP-1 agonist.                Plan of care reviewed with patient at the conclusion of today's visit. Education was provided regarding diagnosis, management and any prescribed or recommended OTC medications.  Patient verbalizes understanding of and agreement with management plan.        SONYA Goldberg

## 2018-11-13 ENCOUNTER — TELEPHONE (OUTPATIENT)
Dept: FAMILY MEDICINE CLINIC | Facility: CLINIC | Age: 83
End: 2018-11-13

## 2018-11-13 NOTE — TELEPHONE ENCOUNTER
[facility] ON [address] TO HAVE APP[facility]AL FOR PT MEDS, SAYS THE PT IS WAIT IN STORE TILL IS TAKEN CARE OF.     MED:   GLIUCOPHAGE 1000 MG     PHARMACY NUMBER: [phone]

## 2018-11-26 ENCOUNTER — TELEPHONE (OUTPATIENT)
Dept: FAMILY MEDICINE CLINIC | Facility: CLINIC | Age: 83
End: 2018-11-26

## 2018-11-27 NOTE — TELEPHONE ENCOUNTER
Those look good. Recommend continuing to focus on controlling diabetes through diet. We will repeat his A1C at his next visit.

## 2018-11-27 NOTE — TELEPHONE ENCOUNTER
Spoke with patient regarding Slade Buckels recommendations on glucose readings. Pt verbalized understanding.

## 2018-12-20 ENCOUNTER — TELEPHONE (OUTPATIENT)
Dept: FAMILY MEDICINE CLINIC | Facility: CLINIC | Age: 83
End: 2018-12-20

## 2018-12-20 NOTE — TELEPHONE ENCOUNTER
Pt called about form for [facility] for cpap supply , please fax to [phone]    Pt requesting a call back

## 2019-01-22 ENCOUNTER — TELEPHONE (OUTPATIENT)
Dept: FAMILY MEDICINE CLINIC | Facility: CLINIC | Age: 84
End: 2019-01-22

## 2019-01-22 NOTE — TELEPHONE ENCOUNTER
[facility] was told this PT was NOT a patient of Slade Silverman.   PT needs c-pap supply     [facility] [phone] option o    Please call Negin As soon as possible

## 2019-01-23 NOTE — TELEPHONE ENCOUNTER
Spoke with [facility] who explained that they did not have a dee but do see that the patient needs a detailed written order. They will be faxing it to our office for Slade to sign, date and fax back.

## 2019-01-28 ENCOUNTER — TELEPHONE (OUTPATIENT)
Dept: FAMILY MEDICINE CLINIC | Facility: CLINIC | Age: 84
End: 2019-01-28

## 2019-01-28 RX ORDER — ALLOPURINOL 300 MG/1
300 TABLET ORAL DAILY
Qty: 90 TABLET | Refills: 0 | Status: SHIPPED | OUTPATIENT
Start: 2019-01-28 | End: 2019-05-09 | Stop reason: SDUPTHER

## 2019-02-01 ENCOUNTER — APPOINTMENT (OUTPATIENT)
Dept: LAB | Facility: HOSPITAL | Age: 84
End: 2019-02-01

## 2019-02-01 ENCOUNTER — TELEPHONE (OUTPATIENT)
Dept: FAMILY MEDICINE CLINIC | Facility: CLINIC | Age: 84
End: 2019-02-01

## 2019-02-01 ENCOUNTER — TRANSCRIBE ORDERS (OUTPATIENT)
Dept: LAB | Facility: HOSPITAL | Age: 84
End: 2019-02-01

## 2019-02-01 DIAGNOSIS — I10 ESSENTIAL HYPERTENSION, MALIGNANT: Primary | ICD-10-CM

## 2019-02-01 DIAGNOSIS — I10 ESSENTIAL HYPERTENSION, MALIGNANT: ICD-10-CM

## 2019-02-01 DIAGNOSIS — E55.9 VITAMIN D DEFICIENCY: ICD-10-CM

## 2019-02-01 LAB
25(OH)D3 SERPL-MCNC: 84 NG/ML
ALBUMIN SERPL-MCNC: 4.33 G/DL (ref 3.2–4.8)
ANION GAP SERPL CALCULATED.3IONS-SCNC: 9 MMOL/L (ref 3–11)
BACTERIA UR QL AUTO: ABNORMAL /HPF
BASOPHILS # BLD AUTO: 0.03 10*3/MM3 (ref 0–0.2)
BASOPHILS NFR BLD AUTO: 0.5 % (ref 0–1)
BILIRUB UR QL STRIP: NEGATIVE
BUN BLD-MCNC: 40 MG/DL (ref 9–23)
BUN/CREAT SERPL: 22.2 (ref 7–25)
CALCIUM SPEC-SCNC: 9.9 MG/DL (ref 8.7–10.4)
CHLORIDE SERPL-SCNC: 102 MMOL/L (ref 99–109)
CLARITY UR: CLEAR
CO2 SERPL-SCNC: 27 MMOL/L (ref 20–31)
COLOR UR: YELLOW
CREAT BLD-MCNC: 1.8 MG/DL (ref 0.6–1.3)
CREAT UR-MCNC: 74.8 MG/DL
DEPRECATED RDW RBC AUTO: 53.4 FL (ref 37–54)
EOSINOPHIL # BLD AUTO: 0.24 10*3/MM3 (ref 0–0.3)
EOSINOPHIL NFR BLD AUTO: 4 % (ref 0–3)
ERYTHROCYTE [DISTWIDTH] IN BLOOD BY AUTOMATED COUNT: 15.4 % (ref 11.3–14.5)
GFR SERPL CREATININE-BSD FRML MDRD: 36 ML/MIN/1.73
GLUCOSE BLD-MCNC: 91 MG/DL (ref 70–100)
GLUCOSE UR STRIP-MCNC: NEGATIVE MG/DL
HCT VFR BLD AUTO: 49 % (ref 38.9–50.9)
HGB BLD-MCNC: 16.1 G/DL (ref 13.1–17.5)
HGB UR QL STRIP.AUTO: ABNORMAL
HYALINE CASTS UR QL AUTO: ABNORMAL /LPF
IMM GRANULOCYTES # BLD AUTO: 0.01 10*3/MM3 (ref 0–0.03)
IMM GRANULOCYTES NFR BLD AUTO: 0.2 % (ref 0–0.6)
KETONES UR QL STRIP: NEGATIVE
LEUKOCYTE ESTERASE UR QL STRIP.AUTO: NEGATIVE
LYMPHOCYTES # BLD AUTO: 0.87 10*3/MM3 (ref 0.6–4.8)
LYMPHOCYTES NFR BLD AUTO: 14.5 % (ref 24–44)
MCH RBC QN AUTO: 31.5 PG (ref 27–31)
MCHC RBC AUTO-ENTMCNC: 32.9 G/DL (ref 32–36)
MCV RBC AUTO: 95.9 FL (ref 80–99)
MONOCYTES # BLD AUTO: 0.7 10*3/MM3 (ref 0–1)
MONOCYTES NFR BLD AUTO: 11.7 % (ref 0–12)
NEUTROPHILS # BLD AUTO: 4.15 10*3/MM3 (ref 1.5–8.3)
NEUTROPHILS NFR BLD AUTO: 69.3 % (ref 41–71)
NITRITE UR QL STRIP: NEGATIVE
PH UR STRIP.AUTO: <=5 [PH] (ref 5–8)
PHOSPHATE SERPL-MCNC: 3.4 MG/DL (ref 2.4–5.1)
PLATELET # BLD AUTO: 155 10*3/MM3 (ref 150–450)
PMV BLD AUTO: 11.8 FL (ref 6–12)
POTASSIUM BLD-SCNC: 4.3 MMOL/L (ref 3.5–5.5)
PROT UR QL STRIP: ABNORMAL
PROT UR-MCNC: 76 MG/DL (ref 1–14)
PTH-INTACT SERPL-MCNC: 48 PG/ML (ref 11–80)
RBC # BLD AUTO: 5.11 10*6/MM3 (ref 4.2–5.76)
RBC # UR: ABNORMAL /HPF
REF LAB TEST METHOD: ABNORMAL
SODIUM BLD-SCNC: 138 MMOL/L (ref 132–146)
SP GR UR STRIP: 1.02 (ref 1–1.03)
SQUAMOUS #/AREA URNS HPF: ABNORMAL /HPF
URATE SERPL-MCNC: 5.2 MG/DL (ref 3.7–9.2)
UROBILINOGEN UR QL STRIP: ABNORMAL
WBC NRBC COR # BLD: 5.99 10*3/MM3 (ref 3.5–10.8)
WBC UR QL AUTO: ABNORMAL /HPF

## 2019-02-01 PROCEDURE — 80069 RENAL FUNCTION PANEL: CPT | Performed by: INTERNAL MEDICINE

## 2019-02-01 PROCEDURE — 85025 COMPLETE CBC W/AUTO DIFF WBC: CPT | Performed by: INTERNAL MEDICINE

## 2019-02-01 PROCEDURE — 84550 ASSAY OF BLOOD/URIC ACID: CPT

## 2019-02-01 PROCEDURE — 84156 ASSAY OF PROTEIN URINE: CPT

## 2019-02-01 PROCEDURE — 82306 VITAMIN D 25 HYDROXY: CPT | Performed by: INTERNAL MEDICINE

## 2019-02-01 PROCEDURE — 83970 ASSAY OF PARATHORMONE: CPT

## 2019-02-01 PROCEDURE — 81001 URINALYSIS AUTO W/SCOPE: CPT

## 2019-02-01 PROCEDURE — 82570 ASSAY OF URINE CREATININE: CPT

## 2019-02-01 PROCEDURE — 36415 COLL VENOUS BLD VENIPUNCTURE: CPT | Performed by: INTERNAL MEDICINE

## 2019-02-01 RX ORDER — LOVASTATIN 20 MG/1
20 TABLET ORAL NIGHTLY
Qty: 90 TABLET | Refills: 1 | Status: SHIPPED | OUTPATIENT
Start: 2019-02-01 | End: 2019-07-26 | Stop reason: SDUPTHER

## 2019-02-01 RX ORDER — LEVOTHYROXINE SODIUM 0.2 MG/1
200 TABLET ORAL DAILY
Qty: 90 TABLET | Refills: 1 | Status: SHIPPED | OUTPATIENT
Start: 2019-02-01 | End: 2019-04-14

## 2019-02-01 RX ORDER — RIVAROXABAN 15 MG/1
15 TABLET, FILM COATED ORAL
Qty: 90 TABLET | Refills: 0 | Status: SHIPPED | OUTPATIENT
Start: 2019-02-01 | End: 2019-07-26 | Stop reason: SDUPTHER

## 2019-02-01 NOTE — TELEPHONE ENCOUNTER
MED REFILL:  XARELTO 15 MG 1X DAILY, 90 DAYS WITH REFILLS.  LEVOTHYROXINE 200 MCG, 1X DAILY, 90 DAYS WITH REFILLS,LOVASTATIN 20 MG, 1X DAILY, 90 DAYS WITH REFILLS.  [facility].

## 2019-02-08 DIAGNOSIS — I10 ESSENTIAL HYPERTENSION: Primary | ICD-10-CM

## 2019-02-08 RX ORDER — CHLORTHALIDONE 25 MG/1
25 TABLET ORAL DAILY
Qty: 90 TABLET | Refills: 0 | Status: SHIPPED | OUTPATIENT
Start: 2019-02-08 | End: 2019-08-09 | Stop reason: SDUPTHER

## 2019-02-18 ENCOUNTER — TELEPHONE (OUTPATIENT)
Dept: FAMILY MEDICINE CLINIC | Facility: CLINIC | Age: 84
End: 2019-02-18

## 2019-02-18 RX ORDER — TOLTERODINE TARTRATE 2 MG/1
2 TABLET, EXTENDED RELEASE ORAL 2 TIMES DAILY
Qty: 60 TABLET | Refills: 1 | Status: SHIPPED | OUTPATIENT
Start: 2019-02-18 | End: 2019-04-19 | Stop reason: SDUPTHER

## 2019-02-18 NOTE — TELEPHONE ENCOUNTER
Patient request refill:    tolterodine (DETROL) 2 MG tablet     Takes twice a day.    [facility]

## 2019-03-13 RX ORDER — MONTELUKAST SODIUM 10 MG/1
10 TABLET ORAL NIGHTLY
Qty: 90 TABLET | Refills: 3 | Status: SHIPPED | OUTPATIENT
Start: 2019-03-13

## 2019-03-18 ENCOUNTER — OFFICE VISIT (OUTPATIENT)
Dept: FAMILY MEDICINE CLINIC | Facility: CLINIC | Age: 84
End: 2019-03-18

## 2019-03-18 VITALS
WEIGHT: 270 LBS | HEIGHT: 69 IN | SYSTOLIC BLOOD PRESSURE: 140 MMHG | DIASTOLIC BLOOD PRESSURE: 80 MMHG | OXYGEN SATURATION: 98 % | BODY MASS INDEX: 39.99 KG/M2 | HEART RATE: 55 BPM

## 2019-03-18 DIAGNOSIS — N18.30 STAGE 3 CHRONIC KIDNEY DISEASE (HCC): ICD-10-CM

## 2019-03-18 DIAGNOSIS — E66.01 CLASS 2 SEVERE OBESITY DUE TO EXCESS CALORIES WITH SERIOUS COMORBIDITY AND BODY MASS INDEX (BMI) OF 38.0 TO 38.9 IN ADULT (HCC): ICD-10-CM

## 2019-03-18 DIAGNOSIS — I48.20 CHRONIC ATRIAL FIBRILLATION (HCC): ICD-10-CM

## 2019-03-18 DIAGNOSIS — R09.89 ABNORMAL PERIPHERAL PULSE: ICD-10-CM

## 2019-03-18 DIAGNOSIS — C43.59 MALIGNANT MELANOMA OF TORSO EXCLUDING BREAST (HCC): ICD-10-CM

## 2019-03-18 DIAGNOSIS — E78.00 HYPERCHOLESTEROLEMIA: ICD-10-CM

## 2019-03-18 DIAGNOSIS — I25.10 CORONARY ARTERY DISEASE INVOLVING NATIVE CORONARY ARTERY OF NATIVE HEART WITHOUT ANGINA PECTORIS: ICD-10-CM

## 2019-03-18 DIAGNOSIS — E11.3213 BOTH EYES AFFECTED BY MILD NONPROLIFERATIVE DIABETIC RETINOPATHY WITH MACULAR EDEMA, ASSOCIATED WITH TYPE 2 DIABETES MELLITUS (HCC): ICD-10-CM

## 2019-03-18 DIAGNOSIS — E11.22 TYPE 2 DIABETES MELLITUS WITH STAGE 3 CHRONIC KIDNEY DISEASE, WITHOUT LONG-TERM CURRENT USE OF INSULIN (HCC): ICD-10-CM

## 2019-03-18 DIAGNOSIS — C73 MALIGNANT NEOPLASM OF THYROID GLAND (HCC): ICD-10-CM

## 2019-03-18 DIAGNOSIS — Z00.00 MEDICARE ANNUAL WELLNESS VISIT, SUBSEQUENT: Primary | ICD-10-CM

## 2019-03-18 DIAGNOSIS — N18.30 TYPE 2 DIABETES MELLITUS WITH STAGE 3 CHRONIC KIDNEY DISEASE, WITHOUT LONG-TERM CURRENT USE OF INSULIN (HCC): ICD-10-CM

## 2019-03-18 DIAGNOSIS — I10 ESSENTIAL HYPERTENSION: ICD-10-CM

## 2019-03-18 DIAGNOSIS — Z86.010 HISTORY OF COLONIC POLYPS: ICD-10-CM

## 2019-03-18 DIAGNOSIS — L81.9 DISCOLORATION OF SKIN OF FOOT: ICD-10-CM

## 2019-03-18 DIAGNOSIS — G47.33 OBSTRUCTIVE SLEEP APNEA: ICD-10-CM

## 2019-03-18 DIAGNOSIS — E03.9 ACQUIRED HYPOTHYROIDISM: ICD-10-CM

## 2019-03-18 PROBLEM — K52.832 LYMPHOCYTIC COLITIS: Status: ACTIVE | Noted: 2019-03-18

## 2019-03-18 PROBLEM — M19.90 OSTEOARTHRITIS: Status: ACTIVE | Noted: 2019-03-18

## 2019-03-18 PROBLEM — E66.812 CLASS 2 SEVERE OBESITY DUE TO EXCESS CALORIES WITH SERIOUS COMORBIDITY AND BODY MASS INDEX (BMI) OF 38.0 TO 38.9 IN ADULT: Status: ACTIVE | Noted: 2019-03-18

## 2019-03-18 PROBLEM — K64.9 HEMORRHOIDS: Status: ACTIVE | Noted: 2019-03-18

## 2019-03-18 PROBLEM — Z86.0100 HISTORY OF COLONIC POLYPS: Status: ACTIVE | Noted: 2019-03-18

## 2019-03-18 PROBLEM — G62.9 PERIPHERAL NEUROPATHY: Status: ACTIVE | Noted: 2019-03-18

## 2019-03-18 PROBLEM — N18.4 CKD (CHRONIC KIDNEY DISEASE) STAGE 4, GFR 15-29 ML/MIN (HCC): Status: ACTIVE | Noted: 2019-03-18

## 2019-03-18 LAB — HBA1C MFR BLD: 6.2 %

## 2019-03-18 PROCEDURE — 83036 HEMOGLOBIN GLYCOSYLATED A1C: CPT | Performed by: PHYSICIAN ASSISTANT

## 2019-03-18 PROCEDURE — G0439 PPPS, SUBSEQ VISIT: HCPCS | Performed by: PHYSICIAN ASSISTANT

## 2019-03-19 ENCOUNTER — LAB (OUTPATIENT)
Dept: LAB | Facility: HOSPITAL | Age: 84
End: 2019-03-19

## 2019-03-19 DIAGNOSIS — Z00.00 MEDICARE ANNUAL WELLNESS VISIT, SUBSEQUENT: ICD-10-CM

## 2019-03-19 DIAGNOSIS — E11.22 TYPE 2 DIABETES MELLITUS WITH STAGE 3 CHRONIC KIDNEY DISEASE, WITHOUT LONG-TERM CURRENT USE OF INSULIN (HCC): ICD-10-CM

## 2019-03-19 DIAGNOSIS — R74.8 ELEVATED LIVER ENZYMES: ICD-10-CM

## 2019-03-19 DIAGNOSIS — N18.30 TYPE 2 DIABETES MELLITUS WITH STAGE 3 CHRONIC KIDNEY DISEASE, WITHOUT LONG-TERM CURRENT USE OF INSULIN (HCC): ICD-10-CM

## 2019-03-19 LAB
ALBUMIN SERPL-MCNC: 4.23 G/DL (ref 3.2–4.8)
ALP SERPL-CCNC: 151 U/L (ref 25–100)
ALT SERPL W P-5'-P-CCNC: 40 U/L (ref 7–40)
ARTICHOKE IGE QN: 74 MG/DL (ref 0–130)
AST SERPL-CCNC: 35 U/L (ref 0–33)
BASOPHILS # BLD AUTO: 0.03 10*3/MM3 (ref 0–0.2)
BASOPHILS NFR BLD AUTO: 0.5 % (ref 0–1)
BILIRUB CONJ SERPL-MCNC: 0.3 MG/DL (ref 0–0.2)
BILIRUB INDIRECT SERPL-MCNC: 0.7 MG/DL (ref 0.1–1.1)
BILIRUB SERPL-MCNC: 1 MG/DL (ref 0.3–1.2)
CHOLEST SERPL-MCNC: 119 MG/DL (ref 0–200)
DEPRECATED RDW RBC AUTO: 53.9 FL (ref 37–54)
EOSINOPHIL # BLD AUTO: 0.24 10*3/MM3 (ref 0–0.3)
EOSINOPHIL NFR BLD AUTO: 4.3 % (ref 0–3)
ERYTHROCYTE [DISTWIDTH] IN BLOOD BY AUTOMATED COUNT: 15.5 % (ref 11.3–14.5)
HCT VFR BLD AUTO: 48.8 % (ref 38.9–50.9)
HDLC SERPL-MCNC: 37 MG/DL (ref 40–60)
HGB BLD-MCNC: 15.9 G/DL (ref 13.1–17.5)
IMM GRANULOCYTES # BLD AUTO: 0.01 10*3/MM3 (ref 0–0.05)
IMM GRANULOCYTES NFR BLD AUTO: 0.2 % (ref 0–0.6)
LYMPHOCYTES # BLD AUTO: 0.86 10*3/MM3 (ref 0.6–4.8)
LYMPHOCYTES NFR BLD AUTO: 15.4 % (ref 24–44)
MCH RBC QN AUTO: 31.1 PG (ref 27–31)
MCHC RBC AUTO-ENTMCNC: 32.6 G/DL (ref 32–36)
MCV RBC AUTO: 95.3 FL (ref 80–99)
MONOCYTES # BLD AUTO: 0.46 10*3/MM3 (ref 0–1)
MONOCYTES NFR BLD AUTO: 8.2 % (ref 0–12)
NEUTROPHILS # BLD AUTO: 4 10*3/MM3 (ref 1.5–8.3)
NEUTROPHILS NFR BLD AUTO: 71.6 % (ref 41–71)
PLATELET # BLD AUTO: 164 10*3/MM3 (ref 150–450)
PMV BLD AUTO: 11.7 FL (ref 6–12)
PROT SERPL-MCNC: 6.4 G/DL (ref 5.7–8.2)
RBC # BLD AUTO: 5.12 10*6/MM3 (ref 4.2–5.76)
TRIGL SERPL-MCNC: 92 MG/DL (ref 0–150)
TSH SERPL DL<=0.05 MIU/L-ACNC: 0.07 MIU/ML (ref 0.35–5.35)
WBC NRBC COR # BLD: 5.59 10*3/MM3 (ref 3.5–10.8)

## 2019-03-19 PROCEDURE — 80076 HEPATIC FUNCTION PANEL: CPT | Performed by: PHYSICIAN ASSISTANT

## 2019-03-19 PROCEDURE — 82043 UR ALBUMIN QUANTITATIVE: CPT | Performed by: PHYSICIAN ASSISTANT

## 2019-03-19 PROCEDURE — 84443 ASSAY THYROID STIM HORMONE: CPT | Performed by: PHYSICIAN ASSISTANT

## 2019-03-19 PROCEDURE — 85025 COMPLETE CBC W/AUTO DIFF WBC: CPT | Performed by: PHYSICIAN ASSISTANT

## 2019-03-19 PROCEDURE — 82977 ASSAY OF GGT: CPT | Performed by: PHYSICIAN ASSISTANT

## 2019-03-19 PROCEDURE — 80061 LIPID PANEL: CPT | Performed by: PHYSICIAN ASSISTANT

## 2019-03-19 PROCEDURE — 82570 ASSAY OF URINE CREATININE: CPT | Performed by: PHYSICIAN ASSISTANT

## 2019-03-20 DIAGNOSIS — R74.8 ELEVATED LIVER ENZYMES: Primary | ICD-10-CM

## 2019-03-20 LAB
CREAT 24H UR-MCNC: 76.6 MG/DL
GGT SERPL-CCNC: 104 U/L (ref 0–72)
MICROALBUMIN UR-MCNC: 335.2 UG/ML
MICROALBUMIN/CREAT UR: 437.6 MG/G CREAT (ref 0–30)

## 2019-04-01 RX ORDER — QUINAPRIL 40 MG/1
40 TABLET ORAL DAILY
Qty: 90 TABLET | Refills: 1 | Status: ON HOLD | OUTPATIENT
Start: 2019-04-01 | End: 2022-08-22 | Stop reason: SDUPTHER

## 2019-04-01 RX ORDER — METOPROLOL SUCCINATE 100 MG/1
100 TABLET, EXTENDED RELEASE ORAL DAILY
Qty: 90 TABLET | Refills: 1 | Status: SHIPPED | OUTPATIENT
Start: 2019-04-01 | End: 2019-05-29 | Stop reason: HOSPADM

## 2019-04-01 NOTE — TELEPHONE ENCOUNTER
Metoprolol 100mg   1 x daily  90 day supply plus refills    quinapril 40 mg   1 x daily  90 day supply plus refills    Pt would like this sent out today    [facility]

## 2019-04-03 ENCOUNTER — HOSPITAL ENCOUNTER (OUTPATIENT)
Dept: CARDIOLOGY | Facility: HOSPITAL | Age: 84
Discharge: HOME | End: 2019-04-03
Admitting: PHYSICIAN ASSISTANT

## 2019-04-03 DIAGNOSIS — R09.89 ABNORMAL PERIPHERAL PULSE: ICD-10-CM

## 2019-04-03 DIAGNOSIS — L81.9 DISCOLORATION OF SKIN OF FOOT: ICD-10-CM

## 2019-04-03 LAB
BH CV LOWER ARTERIAL LEFT ABI RATIO: 1.32
BH CV LOWER ARTERIAL LEFT DORSALIS PEDIS SYS MAX: 171 MMHG
BH CV LOWER ARTERIAL LEFT GREAT TOE SYS MAX: 147 MMHG
BH CV LOWER ARTERIAL LEFT LOW THIGH SYS MAX: 226 MMHG
BH CV LOWER ARTERIAL LEFT POPLITEAL SYS MAX: 219 MMHG
BH CV LOWER ARTERIAL LEFT POST TIBIAL SYS MAX: 184 MMHG
BH CV LOWER ARTERIAL LEFT TBI RATIO: 1.06
BH CV LOWER ARTERIAL RIGHT ABI RATIO: 1.37
BH CV LOWER ARTERIAL RIGHT DORSALIS PEDIS SYS MAX: 183 MMHG
BH CV LOWER ARTERIAL RIGHT GREAT TOE SYS MAX: 130 MMHG
BH CV LOWER ARTERIAL RIGHT LOW THIGH SYS MAX: 200 MMHG
BH CV LOWER ARTERIAL RIGHT POPLITEAL SYS MAX: 187 MMHG
BH CV LOWER ARTERIAL RIGHT POST TIBIAL SYS MAX: 190 MMHG
BH CV LOWER ARTERIAL RIGHT TBI RATIO: 0.94
UPPER ARTERIAL LEFT ARM BRACHIAL SYS MAX: 133 MMHG
UPPER ARTERIAL RIGHT ARM BRACHIAL SYS MAX: 139 MMHG

## 2019-04-03 PROCEDURE — 93923 UPR/LXTR ART STDY 3+ LVLS: CPT | Performed by: INTERNAL MEDICINE

## 2019-04-03 PROCEDURE — 93923 UPR/LXTR ART STDY 3+ LVLS: CPT

## 2019-04-08 ENCOUNTER — TELEPHONE (OUTPATIENT)
Dept: FAMILY MEDICINE CLINIC | Facility: CLINIC | Age: 84
End: 2019-04-08

## 2019-04-08 NOTE — TELEPHONE ENCOUNTER
Pt needs the okay to stop taking Xarelto 3 days before his Surgery on 18th of April.     Please send to:  colorectal surgery   fax : [phone]

## 2019-04-14 DIAGNOSIS — E03.9 ACQUIRED HYPOTHYROIDISM: ICD-10-CM

## 2019-04-14 DIAGNOSIS — E78.5 HYPERLIPIDEMIA, UNSPECIFIED HYPERLIPIDEMIA TYPE: ICD-10-CM

## 2019-04-14 DIAGNOSIS — R79.9 ABNORMAL FINDING OF BLOOD CHEMISTRY: ICD-10-CM

## 2019-04-14 DIAGNOSIS — Z11.59 ENCOUNTER FOR SCREENING FOR OTHER VIRAL DISEASES: ICD-10-CM

## 2019-04-14 DIAGNOSIS — R74.8 ELEVATED LIVER ENZYMES: ICD-10-CM

## 2019-04-14 DIAGNOSIS — C73 MALIGNANT NEOPLASM OF THYROID GLAND (HCC): Primary | ICD-10-CM

## 2019-04-14 PROBLEM — E11.21 DIABETIC NEPHROPATHY ASSOCIATED WITH TYPE 2 DIABETES MELLITUS: Status: ACTIVE | Noted: 2019-04-14

## 2019-04-14 RX ORDER — LEVOTHYROXINE SODIUM 175 UG/1
175 TABLET ORAL DAILY
Qty: 30 TABLET | Refills: 5 | Status: SHIPPED | OUTPATIENT
Start: 2019-04-14

## 2019-04-20 RX ORDER — TOLTERODINE TARTRATE 2 MG/1
TABLET, EXTENDED RELEASE ORAL
Qty: 120 TABLET | Refills: 0 | Status: SHIPPED | OUTPATIENT
Start: 2019-04-20 | End: 2019-06-21 | Stop reason: SDUPTHER

## 2019-04-29 ENCOUNTER — HOSPITAL ENCOUNTER (OUTPATIENT)
Dept: ULTRASOUND IMAGING | Facility: HOSPITAL | Age: 84
Discharge: HOME | End: 2019-04-29
Admitting: PHYSICIAN ASSISTANT

## 2019-04-29 DIAGNOSIS — R74.8 ELEVATED LIVER ENZYMES: ICD-10-CM

## 2019-04-29 PROCEDURE — 76705 ECHO EXAM OF ABDOMEN: CPT

## 2019-04-30 PROBLEM — K80.20 CALCULUS OF GALLBLADDER: Status: ACTIVE | Noted: 2019-04-30

## 2019-05-01 ENCOUNTER — LAB (OUTPATIENT)
Dept: LAB | Facility: HOSPITAL | Age: 84
End: 2019-05-01

## 2019-05-01 DIAGNOSIS — Z11.59 ENCOUNTER FOR SCREENING FOR OTHER VIRAL DISEASES: ICD-10-CM

## 2019-05-01 DIAGNOSIS — C73 MALIGNANT NEOPLASM OF THYROID GLAND (HCC): ICD-10-CM

## 2019-05-01 DIAGNOSIS — R79.9 ABNORMAL FINDING OF BLOOD CHEMISTRY: ICD-10-CM

## 2019-05-01 DIAGNOSIS — E78.5 HYPERLIPIDEMIA, UNSPECIFIED HYPERLIPIDEMIA TYPE: ICD-10-CM

## 2019-05-01 DIAGNOSIS — R74.8 ELEVATED LIVER ENZYMES: ICD-10-CM

## 2019-05-01 LAB
FERRITIN SERPL-MCNC: 121 NG/ML (ref 30–400)
HBV SURFACE AB SER RIA-ACNC: NORMAL
HBV SURFACE AG SERPL QL IA: NORMAL
HCV AB SER DONR QL: NORMAL

## 2019-05-01 PROCEDURE — 84460 ALANINE AMINO (ALT) (SGPT): CPT | Performed by: PHYSICIAN ASSISTANT

## 2019-05-01 PROCEDURE — 84450 TRANSFERASE (AST) (SGOT): CPT | Performed by: PHYSICIAN ASSISTANT

## 2019-05-01 PROCEDURE — 82977 ASSAY OF GGT: CPT | Performed by: PHYSICIAN ASSISTANT

## 2019-05-01 PROCEDURE — 82728 ASSAY OF FERRITIN: CPT | Performed by: PHYSICIAN ASSISTANT

## 2019-05-01 PROCEDURE — 86706 HEP B SURFACE ANTIBODY: CPT | Performed by: PHYSICIAN ASSISTANT

## 2019-05-01 PROCEDURE — 86803 HEPATITIS C AB TEST: CPT | Performed by: PHYSICIAN ASSISTANT

## 2019-05-01 PROCEDURE — 82172 ASSAY OF APOLIPOPROTEIN: CPT | Performed by: PHYSICIAN ASSISTANT

## 2019-05-01 PROCEDURE — 83883 ASSAY NEPHELOMETRY NOT SPEC: CPT | Performed by: PHYSICIAN ASSISTANT

## 2019-05-01 PROCEDURE — 82947 ASSAY GLUCOSE BLOOD QUANT: CPT | Performed by: PHYSICIAN ASSISTANT

## 2019-05-01 PROCEDURE — 87340 HEPATITIS B SURFACE AG IA: CPT | Performed by: PHYSICIAN ASSISTANT

## 2019-05-01 PROCEDURE — 86704 HEP B CORE ANTIBODY TOTAL: CPT | Performed by: PHYSICIAN ASSISTANT

## 2019-05-01 PROCEDURE — 84478 ASSAY OF TRIGLYCERIDES: CPT | Performed by: PHYSICIAN ASSISTANT

## 2019-05-01 PROCEDURE — 82247 BILIRUBIN TOTAL: CPT | Performed by: PHYSICIAN ASSISTANT

## 2019-05-01 PROCEDURE — 36415 COLL VENOUS BLD VENIPUNCTURE: CPT

## 2019-05-01 PROCEDURE — 83010 ASSAY OF HAPTOGLOBIN QUANT: CPT | Performed by: PHYSICIAN ASSISTANT

## 2019-05-01 PROCEDURE — 82465 ASSAY BLD/SERUM CHOLESTEROL: CPT | Performed by: PHYSICIAN ASSISTANT

## 2019-05-01 PROCEDURE — 84432 ASSAY OF THYROGLOBULIN: CPT | Performed by: PHYSICIAN ASSISTANT

## 2019-05-02 LAB — HBV CORE AB SER DONR QL IA: NEGATIVE

## 2019-05-03 LAB
A2 MACROGLOB SERPL-MCNC: 247 MG/DL (ref 110–276)
ALT SERPL W P-5'-P-CCNC: 30 IU/L (ref 0–55)
APO A-I SERPL-MCNC: 116 MG/DL (ref 101–178)
AST SERPL W P-5'-P-CCNC: 31 IU/L (ref 0–40)
BILIRUB SERPL-MCNC: 1 MG/DL (ref 0–1.2)
CHOLEST SERPL-MCNC: 109 MG/DL (ref 100–199)
FIBROSIS SCORING:: ABNORMAL
FIBROSIS STAGE SERPL QL: ABNORMAL
GGT SERPL-CCNC: 82 IU/L (ref 0–65)
GLUCOSE SERPL-MCNC: 122 MG/DL (ref 65–99)
HAPTOGLOB SERPL-MCNC: 98 MG/DL (ref 34–200)
INTERPRETATIONS: (REFERENCE): ABNORMAL
LABORATORY COMMENT REPORT: ABNORMAL
LIMITATIONS: (REFERENCE): ABNORMAL
LIVER FIBR SCORE SERPL CALC.FIBROSURE: 0.82 (ref 0–0.21)
NASH GRADE (REFERENCE): ABNORMAL
NASH SCORE (REFERENCE): 0.5
NASH SCORING (REFERENCE): ABNORMAL
STEATOSIS GRADE (REFERENCE): ABNORMAL
STEATOSIS GRADING (REFERENCE): ABNORMAL
STEATOSIS SCORE (REFERENCE): 0.68 (ref 0–0.3)
TRIGL SERPL-MCNC: 78 MG/DL (ref 0–149)
WEIGHT: (REFERENCE): 270 LBS

## 2019-05-08 LAB — THYROGLOBULIN (TG-RIA): <2 NG/ML

## 2019-05-09 ENCOUNTER — TRANSCRIBE ORDERS (OUTPATIENT)
Dept: LAB | Facility: HOSPITAL | Age: 84
End: 2019-05-09

## 2019-05-09 ENCOUNTER — TELEPHONE (OUTPATIENT)
Dept: FAMILY MEDICINE CLINIC | Facility: CLINIC | Age: 84
End: 2019-05-09

## 2019-05-09 ENCOUNTER — LAB (OUTPATIENT)
Dept: LAB | Facility: HOSPITAL | Age: 84
End: 2019-05-09

## 2019-05-09 DIAGNOSIS — N18.30 CHRONIC KIDNEY DISEASE, STAGE III (MODERATE) (HCC): Primary | ICD-10-CM

## 2019-05-09 DIAGNOSIS — N18.30 TYPE 2 DIABETES MELLITUS WITH STAGE 3 CHRONIC KIDNEY DISEASE, WITHOUT LONG-TERM CURRENT USE OF INSULIN (HCC): Primary | ICD-10-CM

## 2019-05-09 DIAGNOSIS — E11.22 TYPE 2 DIABETES MELLITUS WITH STAGE 3 CHRONIC KIDNEY DISEASE, WITHOUT LONG-TERM CURRENT USE OF INSULIN (HCC): Primary | ICD-10-CM

## 2019-05-09 LAB
ALBUMIN SERPL-MCNC: 4 G/DL (ref 3.5–5.2)
ANION GAP SERPL CALCULATED.3IONS-SCNC: 11.7 MMOL/L
BACTERIA UR QL AUTO: ABNORMAL /HPF
BASOPHILS # BLD AUTO: 0.06 10*3/MM3 (ref 0–0.2)
BASOPHILS NFR BLD AUTO: 1.2 % (ref 0–1.5)
BILIRUB UR QL STRIP: NEGATIVE
BUN BLD-MCNC: 35 MG/DL (ref 8–23)
BUN/CREAT SERPL: 19.4 (ref 7–25)
CALCIUM SPEC-SCNC: 9.5 MG/DL (ref 8.6–10.5)
CHLORIDE SERPL-SCNC: 102 MMOL/L (ref 98–107)
CLARITY UR: CLEAR
CO2 SERPL-SCNC: 27.3 MMOL/L (ref 22–29)
COLOR UR: YELLOW
CREAT BLD-MCNC: 1.8 MG/DL (ref 0.76–1.27)
CREAT UR-MCNC: 93.8 MG/DL
DEPRECATED RDW RBC AUTO: 54.8 FL (ref 37–54)
EOSINOPHIL # BLD AUTO: 0.24 10*3/MM3 (ref 0–0.4)
EOSINOPHIL NFR BLD AUTO: 4.8 % (ref 0.3–6.2)
ERYTHROCYTE [DISTWIDTH] IN BLOOD BY AUTOMATED COUNT: 15.7 % (ref 12.3–15.4)
GFR SERPL CREATININE-BSD FRML MDRD: 36 ML/MIN/1.73
GLUCOSE BLD-MCNC: 105 MG/DL (ref 65–99)
GLUCOSE UR STRIP-MCNC: NEGATIVE MG/DL
HCT VFR BLD AUTO: 49.3 % (ref 37.5–51)
HGB BLD-MCNC: 15.9 G/DL (ref 13–17.7)
HGB UR QL STRIP.AUTO: ABNORMAL
HYALINE CASTS UR QL AUTO: ABNORMAL /LPF
IMM GRANULOCYTES # BLD AUTO: 0.02 10*3/MM3 (ref 0–0.05)
IMM GRANULOCYTES NFR BLD AUTO: 0.4 % (ref 0–0.5)
KETONES UR QL STRIP: NEGATIVE
LEUKOCYTE ESTERASE UR QL STRIP.AUTO: ABNORMAL
LYMPHOCYTES # BLD AUTO: 0.73 10*3/MM3 (ref 0.7–3.1)
LYMPHOCYTES NFR BLD AUTO: 14.7 % (ref 19.6–45.3)
MCH RBC QN AUTO: 30.8 PG (ref 26.6–33)
MCHC RBC AUTO-ENTMCNC: 32.3 G/DL (ref 31.5–35.7)
MCV RBC AUTO: 95.5 FL (ref 79–97)
MONOCYTES # BLD AUTO: 0.55 10*3/MM3 (ref 0.1–0.9)
MONOCYTES NFR BLD AUTO: 11.1 % (ref 5–12)
NEUTROPHILS # BLD AUTO: 3.37 10*3/MM3 (ref 1.7–7)
NEUTROPHILS NFR BLD AUTO: 67.8 % (ref 42.7–76)
NITRITE UR QL STRIP: NEGATIVE
NRBC BLD AUTO-RTO: 0 /100 WBC (ref 0–0.2)
PH UR STRIP.AUTO: 5.5 [PH] (ref 5–8)
PHOSPHATE SERPL-MCNC: 3.7 MG/DL (ref 2.5–4.5)
PLATELET # BLD AUTO: 153 10*3/MM3 (ref 140–450)
PMV BLD AUTO: 11.9 FL (ref 6–12)
POTASSIUM BLD-SCNC: 4.2 MMOL/L (ref 3.5–5.2)
PROT UR QL STRIP: ABNORMAL
PROT UR-MCNC: 63 MG/DL
RBC # BLD AUTO: 5.16 10*6/MM3 (ref 4.14–5.8)
RBC # UR: ABNORMAL /HPF
REF LAB TEST METHOD: ABNORMAL
SODIUM BLD-SCNC: 141 MMOL/L (ref 136–145)
SP GR UR STRIP: 1.02 (ref 1–1.03)
SQUAMOUS #/AREA URNS HPF: ABNORMAL /HPF
UROBILINOGEN UR QL STRIP: ABNORMAL
WBC NRBC COR # BLD: 4.97 10*3/MM3 (ref 3.4–10.8)
WBC UR QL AUTO: ABNORMAL /HPF

## 2019-05-09 PROCEDURE — 82570 ASSAY OF URINE CREATININE: CPT

## 2019-05-09 PROCEDURE — 80069 RENAL FUNCTION PANEL: CPT

## 2019-05-09 PROCEDURE — 84156 ASSAY OF PROTEIN URINE: CPT

## 2019-05-09 PROCEDURE — 85025 COMPLETE CBC W/AUTO DIFF WBC: CPT

## 2019-05-09 PROCEDURE — 82306 VITAMIN D 25 HYDROXY: CPT

## 2019-05-09 PROCEDURE — 36415 COLL VENOUS BLD VENIPUNCTURE: CPT

## 2019-05-09 PROCEDURE — 81001 URINALYSIS AUTO W/SCOPE: CPT

## 2019-05-09 RX ORDER — ALLOPURINOL 300 MG/1
300 TABLET ORAL DAILY
Qty: 90 TABLET | Refills: 0 | Status: SHIPPED | OUTPATIENT
Start: 2019-05-09 | End: 2019-07-26 | Stop reason: SDUPTHER

## 2019-05-10 DIAGNOSIS — K75.81 NONALCOHOLIC STEATOHEPATITIS (NASH): Primary | ICD-10-CM

## 2019-05-10 LAB — 25(OH)D3 SERPL-MCNC: 56.4 NG/ML (ref 30–100)

## 2019-05-22 ENCOUNTER — LAB (OUTPATIENT)
Dept: LAB | Facility: HOSPITAL | Age: 84
End: 2019-05-22

## 2019-05-22 DIAGNOSIS — E03.9 ACQUIRED HYPOTHYROIDISM: ICD-10-CM

## 2019-05-22 LAB — TSH SERPL DL<=0.05 MIU/L-ACNC: 0.52 MIU/ML (ref 0.27–4.2)

## 2019-05-22 PROCEDURE — 84443 ASSAY THYROID STIM HORMONE: CPT | Performed by: PHYSICIAN ASSISTANT

## 2019-05-29 ENCOUNTER — OFFICE VISIT (OUTPATIENT)
Dept: GASTROENTEROLOGY | Facility: CLINIC | Age: 84
End: 2019-05-29

## 2019-05-29 VITALS
SYSTOLIC BLOOD PRESSURE: 132 MMHG | HEIGHT: 69 IN | TEMPERATURE: 97 F | DIASTOLIC BLOOD PRESSURE: 90 MMHG | OXYGEN SATURATION: 96 % | HEART RATE: 57 BPM | RESPIRATION RATE: 16 BRPM | BODY MASS INDEX: 38.03 KG/M2 | WEIGHT: 256.8 LBS

## 2019-05-29 DIAGNOSIS — K75.81 NASH (NONALCOHOLIC STEATOHEPATITIS): Primary | ICD-10-CM

## 2019-05-29 PROCEDURE — 99203 OFFICE O/P NEW LOW 30 MIN: CPT | Performed by: INTERNAL MEDICINE

## 2019-05-29 NOTE — PROGRESS NOTES
PCP: Slade Silverman PA    Chief Complaint   Patient presents with   • HEREDIA        History of Present Illness:   Lio Griffith is a 83 y.o. male who presents to the GI clinic for possible heredia.H/o ckd with cr 1.8, longstanding dm2. Found to have alt levels in 30s. HEREDIA fibrosure score > 0.8 suggestive of cirrhosis. Never been told he had abnormal liver enzymes. Longstanding central obesity. No confusion, gib loss, or ascites. No jaundice. No abdominal pain.      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 Good control       Past Surgical History:   Procedure Laterality Date   • ADENOIDECTOMY     • APPENDECTOMY     • HIP SURGERY Right 03/12/2013    Right JOYA    • JOINT REPLACEMENT     • THYROIDECTOMY     • TONSILLECTOMY     • VITRECTOMY PARS PLANA W/ REPAIR OF MACULAR HOLE           Current Outpatient Medications:   •  allopurinol (ZYLOPRIM) 300 MG tablet, Take 1 tablet by mouth Daily., Disp: 90 tablet, Rfl: 0  •  atenolol (TENORMIN) 100 MG tablet, Take 100 mg by mouth Daily., Disp: , Rfl:   •  chlorthalidone (HYGROTON) 25 MG tablet, Take 1 tablet by mouth Daily., Disp: 90 tablet, Rfl: 0  •  cholecalciferol (VITAMIN D3) 1000 units tablet, Take 2,000 Units by mouth Daily., Disp: , Rfl:   •  fluticasone (FLONASE) 50 MCG/ACT nasal spray, 2 sprays into the nostril(s) as directed by provider Daily., Disp: 1 bottle, Rfl: 5  •  glucose blood test strip, Monitor glucose once daily before breakfast., Disp: 100 each, Rfl: 3  •  glucose monitor monitoring kit, 1 each Every Morning Before Breakfast., Disp: 1 each, Rfl: 0  •  levothyroxine (SYNTHROID, LEVOTHROID) 175 MCG tablet, Take 1 tablet by mouth Daily., Disp: 30 tablet, Rfl: 5  •  lovastatin (MEVACOR) 20 MG tablet, Take 1 tablet by mouth Every Night., Disp: 90 tablet, Rfl: 1  •  montelukast  (SINGULAIR) 10 MG tablet, Take 1 tablet by mouth Every Night., Disp: 90 tablet, Rfl: 3  •  Multiple Vitamins-Minerals (MULTIVITAMIN ADULT PO), Take  by mouth., Disp: , Rfl:   •  ONETOUCH DELICA LANCETS 33G misc, Use as directed once daily., Disp: 100 each, Rfl: 3  •  quinapril (ACCUPRIL) 40 MG tablet, Take 1 tablet by mouth Daily., Disp: 90 tablet, Rfl: 1  •  tolterodine (DETROL) 2 MG tablet, TAKE ONE TABLET BY MOUTH TWICE A DAY, Disp: 120 tablet, Rfl: 0  •  XARELTO 15 MG tablet, Take 1 tablet by mouth Daily With Dinner., Disp: 90 tablet, Rfl: 0    No Known Allergies    Family History   Problem Relation Age of Onset   • Cancer Father    • Diabetes Brother    • Hypertension Brother    • Obesity Brother        Social History     Socioeconomic History   • Marital status:      Spouse name: Not on file   • Number of children: Not on file   • Years of education: Not on file   • Highest education level: Not on file   Tobacco Use   • Smoking status: Former Smoker     Packs/day: 0.50     Types: Cigarettes     Start date:      Last attempt to quit:      Years since quittin.4   • Smokeless tobacco: Never Used   Substance and Sexual Activity   • Alcohol use: No   • Drug use: No   • Sexual activity: Defer       Review of Systems   All other systems reviewed and are negative.    All other systems reviewed and are negative.    Vitals:    19 1535   BP: 132/90   Pulse: 57   Resp: 16   Temp: 97 °F (36.1 °C)   SpO2: 96%       Physical Exam  General Appearance:  Vitals as above. no acute distress  Head/face:  Normocephalic, atraumatic  Eyes:   EOMI, no conjunctivitis or icterus   Nose/Sinuses:  Nares patent bilaterally without discharge or lesions  Mouth/Throat:  Posterior pharynx is pink without drainage or exudates;  dentition is in good condition and repair  Neck:  trachea is midline, no thyromegaly  Neurologic:  Alert; no focal deficits; age appropriate behavior and speech  Psychiatric: mood and affect are  congruent  Skin: no rash or cyanosis.  Abdomen: obese and soft/nt      Assessment/Plan  1.) BRAMBILA  2.) Possible cirrhosis  Criteria: metabolic syndrome, platelets 153k, fibrosure score, age    Will plan egd to screen for EV  U/s liver without findings of cirrhosis. Repeat 10/2019  No decompensating events    F/u in 6 months with goal: 10% wt loss a year and optimization of metabolic syndrome (thyroid, dm2, obesity, htn)      Umang Suarez MD  5/29/2019

## 2019-06-10 ENCOUNTER — TELEPHONE (OUTPATIENT)
Dept: FAMILY MEDICINE CLINIC | Facility: CLINIC | Age: 84
End: 2019-06-10

## 2019-06-10 NOTE — TELEPHONE ENCOUNTER
[facility] Dentist called needing instructions on how the patient should taper off his blood thinners for oral surgery. Please call back at [phone]

## 2019-06-17 ENCOUNTER — OFFICE VISIT (OUTPATIENT)
Dept: FAMILY MEDICINE CLINIC | Facility: CLINIC | Age: 84
End: 2019-06-17

## 2019-06-17 VITALS
SYSTOLIC BLOOD PRESSURE: 140 MMHG | BODY MASS INDEX: 38.06 KG/M2 | HEIGHT: 69 IN | WEIGHT: 257 LBS | DIASTOLIC BLOOD PRESSURE: 82 MMHG | OXYGEN SATURATION: 98 % | HEART RATE: 56 BPM

## 2019-06-17 DIAGNOSIS — J20.9 ACUTE BRONCHITIS, UNSPECIFIED ORGANISM: ICD-10-CM

## 2019-06-17 DIAGNOSIS — E66.01 CLASS 2 SEVERE OBESITY DUE TO EXCESS CALORIES WITH SERIOUS COMORBIDITY AND BODY MASS INDEX (BMI) OF 38.0 TO 38.9 IN ADULT (HCC): ICD-10-CM

## 2019-06-17 DIAGNOSIS — N18.30 TYPE 2 DIABETES MELLITUS WITH STAGE 3 CHRONIC KIDNEY DISEASE, WITHOUT LONG-TERM CURRENT USE OF INSULIN (HCC): Primary | ICD-10-CM

## 2019-06-17 DIAGNOSIS — I10 ESSENTIAL HYPERTENSION: ICD-10-CM

## 2019-06-17 DIAGNOSIS — E11.22 TYPE 2 DIABETES MELLITUS WITH STAGE 3 CHRONIC KIDNEY DISEASE, WITHOUT LONG-TERM CURRENT USE OF INSULIN (HCC): Primary | ICD-10-CM

## 2019-06-17 DIAGNOSIS — I48.20 CHRONIC ATRIAL FIBRILLATION (HCC): ICD-10-CM

## 2019-06-17 DIAGNOSIS — K75.81 NASH (NONALCOHOLIC STEATOHEPATITIS): ICD-10-CM

## 2019-06-17 LAB — HBA1C MFR BLD: 6.1 %

## 2019-06-17 PROCEDURE — 83036 HEMOGLOBIN GLYCOSYLATED A1C: CPT | Performed by: PHYSICIAN ASSISTANT

## 2019-06-17 PROCEDURE — 99214 OFFICE O/P EST MOD 30 MIN: CPT | Performed by: PHYSICIAN ASSISTANT

## 2019-06-17 RX ORDER — DOXYCYCLINE HYCLATE 100 MG
100 TABLET ORAL 2 TIMES DAILY
Qty: 20 TABLET | Refills: 0 | OUTPATIENT
Start: 2019-06-17 | End: 2021-05-23

## 2019-06-17 NOTE — PROGRESS NOTES
Chief Complaint   Patient presents with   • Follow-up   • Cough     chest cold x2 weeks coughing up mucus       HPI     Lio Griffith is a pleasant 83 y.o. male who is here for 3-month follow-up of his chronic conditions. He complains of a cold that moved to chest for 2 weeks. Producing brown sputum. No fever, chills, soa, wheezing, or CP. A friend has had a URI. He denies recent travel.     His glucoses have been in the 120s. He does not eat sweets but states bread is difficult for him to cut out. He does stretches for his core and back at home but otherwise does not exercise. He is down 13 pounds in 3 months with effort.  He saw GI last month for BRAMBILA.     He states his home blood at home his usually less than 140 systolic.    He has an upcoming dental procedure and plans to hold Xarelto for 3 days.  Denies any bleeding issues. No dyspnea, shortness of breath, lightheadedness, or dizziness.         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 Good control       Past Surgical History:   Procedure Laterality Date   • ADENOIDECTOMY     • APPENDECTOMY     • HIP SURGERY Right 2013    Right JOYA    • JOINT REPLACEMENT     • THYROIDECTOMY     • TONSILLECTOMY     • VITRECTOMY PARS PLANA W/ REPAIR OF MACULAR HOLE         Family History   Problem Relation Age of Onset   • Cancer Father    • Diabetes Brother    • Hypertension Brother    • Obesity Brother        Social History     Socioeconomic History   • Marital status:      Spouse name: Not on file   • Number of children: Not on file   • Years of education: Not on file   • Highest education level: Not on file   Tobacco Use   • Smoking status: Former Smoker     Packs/day: 0.50     Types: Cigarettes     Start date:      Last attempt to quit:      Years since quittin.4   •  Smokeless tobacco: Never Used   Substance and Sexual Activity   • Alcohol use: No   • Drug use: No   • Sexual activity: Defer       No Known Allergies    ROS    Review of Systems   Constitutional: Negative for chills and fever.   HENT: Negative for sore throat.    Respiratory: Positive for cough. Negative for shortness of breath and wheezing.    Cardiovascular: Negative for chest pain.   Gastrointestinal: Negative for blood in stool.   Genitourinary: Negative for hematuria.       Vitals:    06/17/19 1018   BP: 140/82   Pulse: 56   SpO2: 98%         Current Outpatient Medications:   •  allopurinol (ZYLOPRIM) 300 MG tablet, Take 1 tablet by mouth Daily., Disp: 90 tablet, Rfl: 0  •  atenolol (TENORMIN) 100 MG tablet, Take 100 mg by mouth Daily., Disp: , Rfl:   •  chlorthalidone (HYGROTON) 25 MG tablet, Take 1 tablet by mouth Daily., Disp: 90 tablet, Rfl: 0  •  cholecalciferol (VITAMIN D3) 1000 units tablet, Take 2,000 Units by mouth Daily., Disp: , Rfl:   •  fluticasone (FLONASE) 50 MCG/ACT nasal spray, 2 sprays into the nostril(s) as directed by provider Daily., Disp: 1 bottle, Rfl: 5  •  glucose blood test strip, Monitor glucose once daily before breakfast., Disp: 100 each, Rfl: 3  •  glucose monitor monitoring kit, 1 each Every Morning Before Breakfast., Disp: 1 each, Rfl: 0  •  levothyroxine (SYNTHROID, LEVOTHROID) 175 MCG tablet, Take 1 tablet by mouth Daily., Disp: 30 tablet, Rfl: 5  •  lovastatin (MEVACOR) 20 MG tablet, Take 1 tablet by mouth Every Night., Disp: 90 tablet, Rfl: 1  •  montelukast (SINGULAIR) 10 MG tablet, Take 1 tablet by mouth Every Night., Disp: 90 tablet, Rfl: 3  •  Multiple Vitamins-Minerals (MULTIVITAMIN ADULT PO), Take  by mouth., Disp: , Rfl:   •  ONETOUCH DELICA LANCETS 33G misc, Use as directed once daily., Disp: 100 each, Rfl: 3  •  quinapril (ACCUPRIL) 40 MG tablet, Take 1 tablet by mouth Daily., Disp: 90 tablet, Rfl: 1  •  tolterodine (DETROL) 2 MG tablet, TAKE ONE TABLET BY MOUTH  TWICE A DAY, Disp: 120 tablet, Rfl: 0  •  XARELTO 15 MG tablet, Take 1 tablet by mouth Daily With Dinner., Disp: 90 tablet, Rfl: 0  •  doxycycline (VIBRAMYICN) 100 MG tablet, Take 1 tablet by mouth 2 (Two) Times a Day., Disp: 20 tablet, Rfl: 0    PE    Physical Exam   Constitutional: He appears well-developed and well-nourished. He does not appear ill. No distress. He is obese.  HENT:   Head: Normocephalic.   Right Ear: Tympanic membrane and ear canal normal. Tympanic membrane is not erythematous.   Left Ear: Tympanic membrane and ear canal normal. Tympanic membrane is not erythematous.   Nose: Nose normal.   Mouth/Throat: Oropharynx is clear and moist and mucous membranes are normal. No posterior oropharyngeal erythema. No tonsillar exudate.   Eyes: Conjunctivae are normal. Right eye exhibits no discharge. Left eye exhibits no discharge.   Neck: Normal range of motion. Neck supple.   Cardiovascular: Normal rate and normal heart sounds. An irregularly irregular rhythm present.   Pulmonary/Chest: Effort normal. No respiratory distress. He has no wheezes. He has rhonchi in the left middle field and the left lower field. He has no rales.   Lymphadenopathy:     He has no cervical adenopathy.        Right: No supraclavicular adenopathy present.        Left: No supraclavicular adenopathy present.   Skin: Skin is warm and dry.   Vitals reviewed.      Results    Results for orders placed or performed in visit on 06/17/19   POC Glycosylated Hemoglobin (Hb A1C)   Result Value Ref Range    Hemoglobin A1C 6.1 %       A/P    Problem List Items Addressed This Visit        Cardiovascular and Mediastinum    Chronic atrial fibrillation (CMS/HCC)    Overview     Diagnosed 1/13  Rate-controlled, anticoagulated. Presently asymptomatic. Continue present management.           Digestive    BRAMBILA (nonalcoholic steatohepatitis)    Overview     -F4 cirrhosis per BRAMBILA Fibrosure serology 5/19  -Patient established with Dr. PATTI  Erick  -Encouraged continued weight reduction         Class 2 severe obesity due to excess calories with serious comorbidity and body mass index (BMI) of 38.0 to 38.9 in adult (CMS/[facility])  -I encouraged his progress and reviewed benefit of exercise as tolerated  -Follow-up 3 months       Endocrine    Type 2 diabetes mellitus with stage 3 chronic kidney disease, without long-term current use of insulin (CMS/[facility]) - Primary    Overview     -A1C 6.1% today. Good control  -Encouraged diabetic diet, exercise       Relevant Orders    POC Glycosylated Hemoglobin (Hb A1C) (Completed)      Other Visit Diagnoses     Acute bronchitis, unspecified organism      -Tx doxycycline, increased fluid intake, mucinex recommended    Essential hypertension  -Borderline SBP today, measurements controlled per patient  -Likely acceptable control given age  -Also follows with nephrology          Plan of care reviewed with patient at the conclusion of today's visit. Education was provided regarding diagnosis, management and any prescribed or recommended OTC medications.  Patient verbalizes understanding of and agreement with management plan.        SONYA Goldberg

## 2019-06-20 NOTE — PROGRESS NOTES
I have reviewed the notes, assessments, and/or procedures performed by Salde Silverman, I concur with her/his documentation of Lio Griffith.

## 2019-06-21 RX ORDER — TOLTERODINE TARTRATE 2 MG/1
TABLET, EXTENDED RELEASE ORAL
Qty: 120 TABLET | Refills: 0 | Status: SHIPPED | OUTPATIENT
Start: 2019-06-21 | End: 2019-08-23 | Stop reason: SDUPTHER

## 2019-07-26 RX ORDER — ALLOPURINOL 300 MG/1
TABLET ORAL
Qty: 90 TABLET | Refills: 0 | Status: SHIPPED | OUTPATIENT
Start: 2019-07-26 | End: 2019-11-01 | Stop reason: SDUPTHER

## 2019-07-26 RX ORDER — LOVASTATIN 20 MG/1
TABLET ORAL
Qty: 90 TABLET | Refills: 0 | Status: SHIPPED | OUTPATIENT
Start: 2019-07-26 | End: 2019-11-01 | Stop reason: SDUPTHER

## 2019-07-26 RX ORDER — RIVAROXABAN 15 MG/1
TABLET, FILM COATED ORAL
Qty: 90 TABLET | Refills: 0 | Status: SHIPPED | OUTPATIENT
Start: 2019-07-26 | End: 2019-11-01 | Stop reason: SDUPTHER

## 2019-08-09 DIAGNOSIS — I10 ESSENTIAL HYPERTENSION: ICD-10-CM

## 2019-08-09 RX ORDER — CHLORTHALIDONE 25 MG/1
TABLET ORAL
Qty: 90 TABLET | Refills: 0 | OUTPATIENT
Start: 2019-08-09 | End: 2021-05-23

## 2019-08-23 RX ORDER — TOLTERODINE TARTRATE 2 MG/1
TABLET, EXTENDED RELEASE ORAL
Qty: 120 TABLET | Refills: 0 | Status: SHIPPED | OUTPATIENT
Start: 2019-08-23 | End: 2019-10-18 | Stop reason: SDUPTHER

## 2019-09-10 ENCOUNTER — TELEPHONE (OUTPATIENT)
Dept: FAMILY MEDICINE CLINIC | Facility: CLINIC | Age: 84
End: 2019-09-10

## 2019-09-10 NOTE — TELEPHONE ENCOUNTER
PT HAS CANCELED HIS [id number] APPT WITH [facility]; HOWEVER HE WANTS [facility] TO CALL HIM

## 2019-10-18 RX ORDER — TOLTERODINE TARTRATE 2 MG/1
TABLET, EXTENDED RELEASE ORAL
Qty: 120 TABLET | Refills: 0 | OUTPATIENT
Start: 2019-10-18 | End: 2021-05-23

## 2019-11-01 RX ORDER — RIVAROXABAN 15 MG/1
TABLET, FILM COATED ORAL
Qty: 90 TABLET | Refills: 0 | Status: SHIPPED | OUTPATIENT
Start: 2019-11-01

## 2019-11-01 RX ORDER — LOVASTATIN 20 MG/1
TABLET ORAL
Qty: 90 TABLET | Refills: 0 | Status: SHIPPED | OUTPATIENT
Start: 2019-11-01 | End: 2022-08-22 | Stop reason: HOSPADM

## 2019-11-01 RX ORDER — ALLOPURINOL 300 MG/1
TABLET ORAL
Qty: 90 TABLET | Refills: 0 | Status: SHIPPED | OUTPATIENT
Start: 2019-11-01

## 2019-11-04 ENCOUNTER — LAB (OUTPATIENT)
Dept: LAB | Facility: HOSPITAL | Age: 84
End: 2019-11-04

## 2019-11-04 ENCOUNTER — TRANSCRIBE ORDERS (OUTPATIENT)
Dept: LAB | Facility: HOSPITAL | Age: 84
End: 2019-11-04

## 2019-11-04 DIAGNOSIS — E55.9 AVITAMINOSIS D: ICD-10-CM

## 2019-11-04 DIAGNOSIS — N18.30 CHRONIC KIDNEY DISEASE, STAGE III (MODERATE) (HCC): ICD-10-CM

## 2019-11-04 DIAGNOSIS — N18.30 CHRONIC KIDNEY DISEASE, STAGE III (MODERATE) (HCC): Primary | ICD-10-CM

## 2019-11-04 LAB
25(OH)D3 SERPL-MCNC: 71.6 NG/ML (ref 30–100)
ALBUMIN SERPL-MCNC: 3.9 G/DL (ref 3.5–5.2)
ANION GAP SERPL CALCULATED.3IONS-SCNC: 8.1 MMOL/L (ref 5–15)
BUN BLD-MCNC: 41 MG/DL (ref 8–23)
BUN/CREAT SERPL: 22.7 (ref 7–25)
CALCIUM SPEC-SCNC: 10 MG/DL (ref 8.6–10.5)
CHLORIDE SERPL-SCNC: 102 MMOL/L (ref 98–107)
CO2 SERPL-SCNC: 31.9 MMOL/L (ref 22–29)
CREAT BLD-MCNC: 1.81 MG/DL (ref 0.76–1.27)
CREAT UR-MCNC: 67.1 MG/DL
GFR SERPL CREATININE-BSD FRML MDRD: 36 ML/MIN/1.73
GLUCOSE BLD-MCNC: 107 MG/DL (ref 65–99)
PHOSPHATE SERPL-MCNC: 3.7 MG/DL (ref 2.5–4.5)
POTASSIUM BLD-SCNC: 4.5 MMOL/L (ref 3.5–5.2)
PROT UR-MCNC: 54 MG/DL
SODIUM BLD-SCNC: 142 MMOL/L (ref 136–145)

## 2019-11-04 PROCEDURE — 36415 COLL VENOUS BLD VENIPUNCTURE: CPT

## 2019-11-04 PROCEDURE — 84156 ASSAY OF PROTEIN URINE: CPT

## 2019-11-04 PROCEDURE — 82306 VITAMIN D 25 HYDROXY: CPT

## 2019-11-04 PROCEDURE — 82570 ASSAY OF URINE CREATININE: CPT

## 2019-11-04 PROCEDURE — 80069 RENAL FUNCTION PANEL: CPT

## 2020-05-04 ENCOUNTER — LAB (OUTPATIENT)
Dept: LAB | Facility: HOSPITAL | Age: 85
End: 2020-05-04

## 2020-05-04 ENCOUNTER — TRANSCRIBE ORDERS (OUTPATIENT)
Dept: LAB | Facility: HOSPITAL | Age: 85
End: 2020-05-04

## 2020-05-04 DIAGNOSIS — N18.30 CHRONIC KIDNEY DISEASE, STAGE III (MODERATE) (HCC): Primary | ICD-10-CM

## 2020-05-04 DIAGNOSIS — N18.30 CHRONIC KIDNEY DISEASE, STAGE III (MODERATE) (HCC): ICD-10-CM

## 2020-05-04 LAB
25(OH)D3 SERPL-MCNC: 63.2 NG/ML (ref 30–100)
ALBUMIN SERPL-MCNC: 4.3 G/DL (ref 3.5–5.2)
ANION GAP SERPL CALCULATED.3IONS-SCNC: 13.4 MMOL/L (ref 5–15)
BACTERIA UR QL AUTO: ABNORMAL /HPF
BASOPHILS # BLD AUTO: 0.07 10*3/MM3 (ref 0–0.2)
BASOPHILS NFR BLD AUTO: 1.4 % (ref 0–1.5)
BILIRUB UR QL STRIP: NEGATIVE
BUN BLD-MCNC: 32 MG/DL (ref 8–23)
BUN/CREAT SERPL: 19.2 (ref 7–25)
CALCIUM SPEC-SCNC: 9.7 MG/DL (ref 8.6–10.5)
CHLORIDE SERPL-SCNC: 98 MMOL/L (ref 98–107)
CLARITY UR: CLEAR
CO2 SERPL-SCNC: 27.6 MMOL/L (ref 22–29)
COLOR UR: YELLOW
CREAT BLD-MCNC: 1.67 MG/DL (ref 0.76–1.27)
CREAT UR-MCNC: 69.7 MG/DL
DEPRECATED RDW RBC AUTO: 51.9 FL (ref 37–54)
EOSINOPHIL # BLD AUTO: 0.23 10*3/MM3 (ref 0–0.4)
EOSINOPHIL NFR BLD AUTO: 4.5 % (ref 0.3–6.2)
ERYTHROCYTE [DISTWIDTH] IN BLOOD BY AUTOMATED COUNT: 14.6 % (ref 12.3–15.4)
GFR SERPL CREATININE-BSD FRML MDRD: 40 ML/MIN/1.73
GLUCOSE BLD-MCNC: 105 MG/DL (ref 65–99)
GLUCOSE UR STRIP-MCNC: NEGATIVE MG/DL
HCT VFR BLD AUTO: 45.9 % (ref 37.5–51)
HGB BLD-MCNC: 15.4 G/DL (ref 13–17.7)
HGB UR QL STRIP.AUTO: NEGATIVE
HYALINE CASTS UR QL AUTO: ABNORMAL /LPF
KETONES UR QL STRIP: NEGATIVE
LEUKOCYTE ESTERASE UR QL STRIP.AUTO: ABNORMAL
LYMPHOCYTES # BLD AUTO: 0.7 10*3/MM3 (ref 0.7–3.1)
LYMPHOCYTES NFR BLD AUTO: 13.8 % (ref 19.6–45.3)
MCH RBC QN AUTO: 32.2 PG (ref 26.6–33)
MCHC RBC AUTO-ENTMCNC: 33.6 G/DL (ref 31.5–35.7)
MCV RBC AUTO: 96 FL (ref 79–97)
MONOCYTES # BLD AUTO: 0.52 10*3/MM3 (ref 0.1–0.9)
MONOCYTES NFR BLD AUTO: 10.2 % (ref 5–12)
NEUTROPHILS # BLD AUTO: 3.55 10*3/MM3 (ref 1.7–7)
NEUTROPHILS NFR BLD AUTO: 69.7 % (ref 42.7–76)
NITRITE UR QL STRIP: NEGATIVE
PH UR STRIP.AUTO: 6.5 [PH] (ref 5–8)
PHOSPHATE SERPL-MCNC: 3.4 MG/DL (ref 2.5–4.5)
PLATELET # BLD AUTO: 148 10*3/MM3 (ref 140–450)
PMV BLD AUTO: 10.5 FL (ref 6–12)
POTASSIUM BLD-SCNC: 3.9 MMOL/L (ref 3.5–5.2)
PROT UR QL STRIP: ABNORMAL
PROT UR-MCNC: 37 MG/DL
PTH-INTACT SERPL-MCNC: 56.9 PG/ML (ref 15–65)
RBC # BLD AUTO: 4.78 10*6/MM3 (ref 4.14–5.8)
RBC # UR: ABNORMAL /HPF
REF LAB TEST METHOD: ABNORMAL
SODIUM BLD-SCNC: 139 MMOL/L (ref 136–145)
SP GR UR STRIP: 1.02 (ref 1–1.03)
SQUAMOUS #/AREA URNS HPF: ABNORMAL /HPF
UROBILINOGEN UR QL STRIP: ABNORMAL
WBC NRBC COR # BLD: 5.09 10*3/MM3 (ref 3.4–10.8)
WBC UR QL AUTO: ABNORMAL /HPF

## 2020-05-04 PROCEDURE — 80069 RENAL FUNCTION PANEL: CPT

## 2020-05-04 PROCEDURE — 82306 VITAMIN D 25 HYDROXY: CPT

## 2020-05-04 PROCEDURE — 81001 URINALYSIS AUTO W/SCOPE: CPT | Performed by: INTERNAL MEDICINE

## 2020-05-04 PROCEDURE — 83970 ASSAY OF PARATHORMONE: CPT

## 2020-05-04 PROCEDURE — 85025 COMPLETE CBC W/AUTO DIFF WBC: CPT

## 2020-05-04 PROCEDURE — 36415 COLL VENOUS BLD VENIPUNCTURE: CPT

## 2020-05-04 PROCEDURE — 84156 ASSAY OF PROTEIN URINE: CPT

## 2020-05-04 PROCEDURE — 82570 ASSAY OF URINE CREATININE: CPT

## 2020-11-04 ENCOUNTER — LAB (OUTPATIENT)
Dept: LAB | Facility: HOSPITAL | Age: 85
End: 2020-11-04

## 2020-11-04 DIAGNOSIS — N18.30 STAGE 3 CHRONIC KIDNEY DISEASE, UNSPECIFIED WHETHER STAGE 3A OR 3B CKD (HCC): Primary | ICD-10-CM

## 2020-11-04 LAB
25(OH)D3 SERPL-MCNC: 68.6 NG/ML (ref 30–100)
ALBUMIN SERPL-MCNC: 4.4 G/DL (ref 3.5–5.2)
ANION GAP SERPL CALCULATED.3IONS-SCNC: 9.8 MMOL/L (ref 5–15)
BACTERIA UR QL AUTO: ABNORMAL /HPF
BASOPHILS # BLD AUTO: 0.04 10*3/MM3 (ref 0–0.2)
BASOPHILS NFR BLD AUTO: 0.8 % (ref 0–1.5)
BILIRUB UR QL STRIP: NEGATIVE
BUN SERPL-MCNC: 48 MG/DL (ref 8–23)
BUN/CREAT SERPL: 20.4 (ref 7–25)
CALCIUM SPEC-SCNC: 10.1 MG/DL (ref 8.6–10.5)
CHLORIDE SERPL-SCNC: 102 MMOL/L (ref 98–107)
CLARITY UR: CLEAR
CO2 SERPL-SCNC: 28.2 MMOL/L (ref 22–29)
COLOR UR: YELLOW
CREAT SERPL-MCNC: 2.35 MG/DL (ref 0.76–1.27)
CREAT UR-MCNC: 107.5 MG/DL
DEPRECATED RDW RBC AUTO: 52.4 FL (ref 37–54)
EOSINOPHIL # BLD AUTO: 0.26 10*3/MM3 (ref 0–0.4)
EOSINOPHIL NFR BLD AUTO: 4.9 % (ref 0.3–6.2)
ERYTHROCYTE [DISTWIDTH] IN BLOOD BY AUTOMATED COUNT: 14.7 % (ref 12.3–15.4)
GFR SERPL CREATININE-BSD FRML MDRD: 27 ML/MIN/1.73
GLUCOSE SERPL-MCNC: 101 MG/DL (ref 65–99)
GLUCOSE UR STRIP-MCNC: NEGATIVE MG/DL
HCT VFR BLD AUTO: 48.4 % (ref 37.5–51)
HGB BLD-MCNC: 15.5 G/DL (ref 13–17.7)
HGB UR QL STRIP.AUTO: ABNORMAL
HYALINE CASTS UR QL AUTO: ABNORMAL /LPF
IMM GRANULOCYTES # BLD AUTO: 0.02 10*3/MM3 (ref 0–0.05)
IMM GRANULOCYTES NFR BLD AUTO: 0.4 % (ref 0–0.5)
KETONES UR QL STRIP: NEGATIVE
LEUKOCYTE ESTERASE UR QL STRIP.AUTO: ABNORMAL
LYMPHOCYTES # BLD AUTO: 0.81 10*3/MM3 (ref 0.7–3.1)
LYMPHOCYTES NFR BLD AUTO: 15.3 % (ref 19.6–45.3)
MCH RBC QN AUTO: 30.5 PG (ref 26.6–33)
MCHC RBC AUTO-ENTMCNC: 32 G/DL (ref 31.5–35.7)
MCV RBC AUTO: 95.3 FL (ref 79–97)
MONOCYTES # BLD AUTO: 0.58 10*3/MM3 (ref 0.1–0.9)
MONOCYTES NFR BLD AUTO: 11 % (ref 5–12)
NEUTROPHILS NFR BLD AUTO: 3.57 10*3/MM3 (ref 1.7–7)
NEUTROPHILS NFR BLD AUTO: 67.6 % (ref 42.7–76)
NITRITE UR QL STRIP: NEGATIVE
NRBC BLD AUTO-RTO: 0 /100 WBC (ref 0–0.2)
PH UR STRIP.AUTO: 5.5 [PH] (ref 5–8)
PHOSPHATE SERPL-MCNC: 4.1 MG/DL (ref 2.5–4.5)
PLATELET # BLD AUTO: 171 10*3/MM3 (ref 140–450)
PMV BLD AUTO: 10.8 FL (ref 6–12)
POTASSIUM SERPL-SCNC: 4.7 MMOL/L (ref 3.5–5.2)
PROT UR QL STRIP: ABNORMAL
PROT UR-MCNC: 28 MG/DL
RBC # BLD AUTO: 5.08 10*6/MM3 (ref 4.14–5.8)
RBC # UR: ABNORMAL /HPF
REF LAB TEST METHOD: ABNORMAL
SODIUM SERPL-SCNC: 140 MMOL/L (ref 136–145)
SP GR UR STRIP: 1.02 (ref 1–1.03)
SQUAMOUS #/AREA URNS HPF: ABNORMAL /HPF
UROBILINOGEN UR QL STRIP: ABNORMAL
WBC # BLD AUTO: 5.28 10*3/MM3 (ref 3.4–10.8)
WBC UR QL AUTO: ABNORMAL /HPF

## 2020-11-04 PROCEDURE — 82570 ASSAY OF URINE CREATININE: CPT

## 2020-11-04 PROCEDURE — 85025 COMPLETE CBC W/AUTO DIFF WBC: CPT

## 2020-11-04 PROCEDURE — 80069 RENAL FUNCTION PANEL: CPT

## 2020-11-04 PROCEDURE — 36415 COLL VENOUS BLD VENIPUNCTURE: CPT

## 2020-11-04 PROCEDURE — 81001 URINALYSIS AUTO W/SCOPE: CPT

## 2020-11-04 PROCEDURE — 82306 VITAMIN D 25 HYDROXY: CPT

## 2020-11-04 PROCEDURE — 84156 ASSAY OF PROTEIN URINE: CPT

## 2021-02-04 ENCOUNTER — TRANSCRIBE ORDERS (OUTPATIENT)
Dept: LAB | Facility: HOSPITAL | Age: 86
End: 2021-02-04

## 2021-02-04 ENCOUNTER — LAB (OUTPATIENT)
Dept: LAB | Facility: HOSPITAL | Age: 86
End: 2021-02-04

## 2021-02-04 DIAGNOSIS — N18.30 STAGE 3 CHRONIC KIDNEY DISEASE, UNSPECIFIED WHETHER STAGE 3A OR 3B CKD (HCC): Primary | ICD-10-CM

## 2021-02-04 DIAGNOSIS — N18.30 STAGE 3 CHRONIC KIDNEY DISEASE, UNSPECIFIED WHETHER STAGE 3A OR 3B CKD (HCC): ICD-10-CM

## 2021-02-04 LAB
ALBUMIN SERPL-MCNC: 4.2 G/DL (ref 3.5–5.2)
ANION GAP SERPL CALCULATED.3IONS-SCNC: 7.4 MMOL/L (ref 5–15)
BASOPHILS # BLD AUTO: 0.06 10*3/MM3 (ref 0–0.2)
BASOPHILS NFR BLD AUTO: 1.1 % (ref 0–1.5)
BILIRUB UR QL STRIP: NEGATIVE
BUN SERPL-MCNC: 29 MG/DL (ref 8–23)
BUN/CREAT SERPL: 18 (ref 7–25)
CALCIUM SPEC-SCNC: 9.9 MG/DL (ref 8.6–10.5)
CHLORIDE SERPL-SCNC: 103 MMOL/L (ref 98–107)
CLARITY UR: CLEAR
CO2 SERPL-SCNC: 30.6 MMOL/L (ref 22–29)
COLOR UR: YELLOW
CREAT SERPL-MCNC: 1.61 MG/DL (ref 0.76–1.27)
CREAT UR-MCNC: 73.1 MG/DL
DEPRECATED RDW RBC AUTO: 48.1 FL (ref 37–54)
EOSINOPHIL # BLD AUTO: 0.32 10*3/MM3 (ref 0–0.4)
EOSINOPHIL NFR BLD AUTO: 6.1 % (ref 0.3–6.2)
ERYTHROCYTE [DISTWIDTH] IN BLOOD BY AUTOMATED COUNT: 14.2 % (ref 12.3–15.4)
GFR SERPL CREATININE-BSD FRML MDRD: 41 ML/MIN/1.73
GLUCOSE SERPL-MCNC: 104 MG/DL (ref 65–99)
GLUCOSE UR STRIP-MCNC: NEGATIVE MG/DL
HCT VFR BLD AUTO: 45.1 % (ref 37.5–51)
HGB BLD-MCNC: 15.1 G/DL (ref 13–17.7)
HGB UR QL STRIP.AUTO: ABNORMAL
IMM GRANULOCYTES # BLD AUTO: 0.03 10*3/MM3 (ref 0–0.05)
IMM GRANULOCYTES NFR BLD AUTO: 0.6 % (ref 0–0.5)
KETONES UR QL STRIP: NEGATIVE
LEUKOCYTE ESTERASE UR QL STRIP.AUTO: NEGATIVE
LYMPHOCYTES # BLD AUTO: 0.69 10*3/MM3 (ref 0.7–3.1)
LYMPHOCYTES NFR BLD AUTO: 13.2 % (ref 19.6–45.3)
MCH RBC QN AUTO: 31.3 PG (ref 26.6–33)
MCHC RBC AUTO-ENTMCNC: 33.5 G/DL (ref 31.5–35.7)
MCV RBC AUTO: 93.6 FL (ref 79–97)
MONOCYTES # BLD AUTO: 0.61 10*3/MM3 (ref 0.1–0.9)
MONOCYTES NFR BLD AUTO: 11.7 % (ref 5–12)
NEUTROPHILS NFR BLD AUTO: 3.52 10*3/MM3 (ref 1.7–7)
NEUTROPHILS NFR BLD AUTO: 67.3 % (ref 42.7–76)
NITRITE UR QL STRIP: NEGATIVE
NRBC BLD AUTO-RTO: 0 /100 WBC (ref 0–0.2)
PH UR STRIP.AUTO: 6.5 [PH] (ref 5–8)
PHOSPHATE SERPL-MCNC: 3.3 MG/DL (ref 2.5–4.5)
PLATELET # BLD AUTO: 166 10*3/MM3 (ref 140–450)
PMV BLD AUTO: 10.6 FL (ref 6–12)
POTASSIUM SERPL-SCNC: 4.4 MMOL/L (ref 3.5–5.2)
PROT UR QL STRIP: ABNORMAL
PROT UR-MCNC: 145 MG/DL
RBC # BLD AUTO: 4.82 10*6/MM3 (ref 4.14–5.8)
SODIUM SERPL-SCNC: 141 MMOL/L (ref 136–145)
SP GR UR STRIP: 1.02 (ref 1–1.03)
UROBILINOGEN UR QL STRIP: ABNORMAL
WBC # BLD AUTO: 5.23 10*3/MM3 (ref 3.4–10.8)

## 2021-02-04 PROCEDURE — 80069 RENAL FUNCTION PANEL: CPT

## 2021-02-04 PROCEDURE — 36415 COLL VENOUS BLD VENIPUNCTURE: CPT

## 2021-02-04 PROCEDURE — 85025 COMPLETE CBC W/AUTO DIFF WBC: CPT

## 2021-02-04 PROCEDURE — 82570 ASSAY OF URINE CREATININE: CPT

## 2021-02-04 PROCEDURE — 84156 ASSAY OF PROTEIN URINE: CPT

## 2021-02-04 PROCEDURE — 81001 URINALYSIS AUTO W/SCOPE: CPT | Performed by: NURSE PRACTITIONER

## 2021-02-05 LAB
BACTERIA UR QL AUTO: ABNORMAL /HPF
HYALINE CASTS UR QL AUTO: ABNORMAL /LPF
RBC # UR: ABNORMAL /HPF
REF LAB TEST METHOD: ABNORMAL
SQUAMOUS #/AREA URNS HPF: ABNORMAL /HPF
WBC UR QL AUTO: ABNORMAL /HPF

## 2021-05-06 ENCOUNTER — HOSPITAL ENCOUNTER (OUTPATIENT)
Dept: GENERAL RADIOLOGY | Facility: HOSPITAL | Age: 86
Discharge: HOME | End: 2021-05-06
Admitting: INTERNAL MEDICINE

## 2021-05-06 ENCOUNTER — TRANSCRIBE ORDERS (OUTPATIENT)
Dept: ADMINISTRATIVE | Facility: HOSPITAL | Age: 86
End: 2021-05-06

## 2021-05-06 DIAGNOSIS — R06.09 DYSPNEA ON EXERTION: Primary | ICD-10-CM

## 2021-05-06 PROCEDURE — 71046 X-RAY EXAM CHEST 2 VIEWS: CPT

## 2021-05-07 ENCOUNTER — TRANSCRIBE ORDERS (OUTPATIENT)
Dept: ADMINISTRATIVE | Facility: HOSPITAL | Age: 86
End: 2021-05-07

## 2021-05-07 DIAGNOSIS — R06.09 OTHER FORMS OF DYSPNEA: Primary | ICD-10-CM

## 2021-05-23 ENCOUNTER — LAB (OUTPATIENT)
Dept: LAB | Facility: HOSPITAL | Age: 86
End: 2021-05-23

## 2021-05-23 DIAGNOSIS — L03.119 CELLULITIS OF LOWER EXTREMITY, UNSPECIFIED LATERALITY: Primary | ICD-10-CM

## 2021-05-23 PROCEDURE — 85025 COMPLETE CBC W/AUTO DIFF WBC: CPT | Performed by: FAMILY MEDICINE

## 2021-05-23 PROCEDURE — 80053 COMPREHEN METABOLIC PANEL: CPT | Performed by: FAMILY MEDICINE

## 2021-05-23 PROCEDURE — 85610 PROTHROMBIN TIME: CPT | Performed by: FAMILY MEDICINE

## 2021-05-23 PROCEDURE — 85730 THROMBOPLASTIN TIME PARTIAL: CPT | Performed by: FAMILY MEDICINE

## 2021-05-23 PROCEDURE — 85060 BLOOD SMEAR INTERPRETATION: CPT | Performed by: FAMILY MEDICINE

## 2021-06-07 ENCOUNTER — LAB (OUTPATIENT)
Dept: LAB | Facility: HOSPITAL | Age: 86
End: 2021-06-07

## 2021-06-07 ENCOUNTER — TRANSCRIBE ORDERS (OUTPATIENT)
Dept: LAB | Facility: HOSPITAL | Age: 86
End: 2021-06-07

## 2021-06-07 DIAGNOSIS — N18.30 STAGE 3 CHRONIC KIDNEY DISEASE, UNSPECIFIED WHETHER STAGE 3A OR 3B CKD (HCC): ICD-10-CM

## 2021-06-07 DIAGNOSIS — N18.30 STAGE 3 CHRONIC KIDNEY DISEASE, UNSPECIFIED WHETHER STAGE 3A OR 3B CKD (HCC): Primary | ICD-10-CM

## 2021-06-07 LAB
25(OH)D3 SERPL-MCNC: 65.3 NG/ML
ALBUMIN SERPL-MCNC: 4.3 G/DL (ref 3.5–5.2)
ANION GAP SERPL CALCULATED.3IONS-SCNC: 8.9 MMOL/L (ref 5–15)
BACTERIA UR QL AUTO: ABNORMAL /HPF
BASOPHILS # BLD AUTO: 0.04 10*3/MM3 (ref 0–0.2)
BASOPHILS NFR BLD AUTO: 0.7 % (ref 0–1.5)
BILIRUB UR QL STRIP: NEGATIVE
BUN SERPL-MCNC: 32 MG/DL (ref 8–23)
BUN/CREAT SERPL: 18.1 (ref 7–25)
CALCIUM SPEC-SCNC: 9.8 MG/DL (ref 8.6–10.5)
CHLORIDE SERPL-SCNC: 102 MMOL/L (ref 98–107)
CLARITY UR: CLEAR
CO2 SERPL-SCNC: 28.1 MMOL/L (ref 22–29)
COLOR UR: YELLOW
CREAT SERPL-MCNC: 1.77 MG/DL (ref 0.76–1.27)
CREAT UR-MCNC: 103.3 MG/DL
DEPRECATED RDW RBC AUTO: 51.3 FL (ref 37–54)
EOSINOPHIL # BLD AUTO: 0.35 10*3/MM3 (ref 0–0.4)
EOSINOPHIL NFR BLD AUTO: 6 % (ref 0.3–6.2)
ERYTHROCYTE [DISTWIDTH] IN BLOOD BY AUTOMATED COUNT: 15.1 % (ref 12.3–15.4)
GFR SERPL CREATININE-BSD FRML MDRD: 37 ML/MIN/1.73
GLUCOSE SERPL-MCNC: 102 MG/DL (ref 65–99)
GLUCOSE UR STRIP-MCNC: NEGATIVE MG/DL
HCT VFR BLD AUTO: 45.2 % (ref 37.5–51)
HGB BLD-MCNC: 15.3 G/DL (ref 13–17.7)
HGB UR QL STRIP.AUTO: NEGATIVE
HYALINE CASTS UR QL AUTO: ABNORMAL /LPF
IMM GRANULOCYTES # BLD AUTO: 0.03 10*3/MM3 (ref 0–0.05)
IMM GRANULOCYTES NFR BLD AUTO: 0.5 % (ref 0–0.5)
KETONES UR QL STRIP: NEGATIVE
LEUKOCYTE ESTERASE UR QL STRIP.AUTO: ABNORMAL
LYMPHOCYTES # BLD AUTO: 0.75 10*3/MM3 (ref 0.7–3.1)
LYMPHOCYTES NFR BLD AUTO: 12.9 % (ref 19.6–45.3)
MCH RBC QN AUTO: 31.5 PG (ref 26.6–33)
MCHC RBC AUTO-ENTMCNC: 33.8 G/DL (ref 31.5–35.7)
MCV RBC AUTO: 93.2 FL (ref 79–97)
MONOCYTES # BLD AUTO: 0.61 10*3/MM3 (ref 0.1–0.9)
MONOCYTES NFR BLD AUTO: 10.5 % (ref 5–12)
NEUTROPHILS NFR BLD AUTO: 4.03 10*3/MM3 (ref 1.7–7)
NEUTROPHILS NFR BLD AUTO: 69.4 % (ref 42.7–76)
NITRITE UR QL STRIP: NEGATIVE
NRBC BLD AUTO-RTO: 0 /100 WBC (ref 0–0.2)
PH UR STRIP.AUTO: 5.5 [PH] (ref 5–8)
PHOSPHATE SERPL-MCNC: 3.6 MG/DL (ref 2.5–4.5)
PLATELET # BLD AUTO: 190 10*3/MM3 (ref 140–450)
PMV BLD AUTO: 10.6 FL (ref 6–12)
POTASSIUM SERPL-SCNC: 4.6 MMOL/L (ref 3.5–5.2)
PROT UR QL STRIP: ABNORMAL
PROT UR-MCNC: 166 MG/DL
RBC # BLD AUTO: 4.85 10*6/MM3 (ref 4.14–5.8)
RBC # UR: ABNORMAL /HPF
REF LAB TEST METHOD: ABNORMAL
SODIUM SERPL-SCNC: 139 MMOL/L (ref 136–145)
SP GR UR STRIP: 1.02 (ref 1–1.03)
SQUAMOUS #/AREA URNS HPF: ABNORMAL /HPF
UROBILINOGEN UR QL STRIP: ABNORMAL
WBC # BLD AUTO: 5.81 10*3/MM3 (ref 3.4–10.8)
WBC UR QL AUTO: ABNORMAL /HPF

## 2021-06-07 PROCEDURE — 36415 COLL VENOUS BLD VENIPUNCTURE: CPT

## 2021-06-07 PROCEDURE — 82306 VITAMIN D 25 HYDROXY: CPT

## 2021-06-07 PROCEDURE — 82570 ASSAY OF URINE CREATININE: CPT

## 2021-06-07 PROCEDURE — 80069 RENAL FUNCTION PANEL: CPT

## 2021-06-07 PROCEDURE — 81001 URINALYSIS AUTO W/SCOPE: CPT | Performed by: INTERNAL MEDICINE

## 2021-06-07 PROCEDURE — 85025 COMPLETE CBC W/AUTO DIFF WBC: CPT

## 2021-06-07 PROCEDURE — 84156 ASSAY OF PROTEIN URINE: CPT

## 2021-06-16 ENCOUNTER — HOSPITAL ENCOUNTER (OUTPATIENT)
Dept: CARDIOLOGY | Facility: HOSPITAL | Age: 86
Discharge: HOME | End: 2021-06-16
Admitting: INTERNAL MEDICINE

## 2021-06-16 VITALS — HEIGHT: 69 IN | BODY MASS INDEX: 35.99 KG/M2 | WEIGHT: 243 LBS

## 2021-06-16 DIAGNOSIS — R06.09 OTHER FORMS OF DYSPNEA: ICD-10-CM

## 2021-06-16 LAB
BH CV ECHO MEAS - AO MAX PG (FULL): 6.6 MMHG
BH CV ECHO MEAS - AO MAX PG: 10 MMHG
BH CV ECHO MEAS - AO MEAN PG (FULL): 4 MMHG
BH CV ECHO MEAS - AO MEAN PG: 6 MMHG
BH CV ECHO MEAS - AO ROOT AREA (BSA CORRECTED): 1.7
BH CV ECHO MEAS - AO ROOT AREA: 11.3 CM^2
BH CV ECHO MEAS - AO ROOT DIAM: 3.8 CM
BH CV ECHO MEAS - AO V2 MAX: 158 CM/SEC
BH CV ECHO MEAS - AO V2 MEAN: 116 CM/SEC
BH CV ECHO MEAS - AO V2 VTI: 30.9 CM
BH CV ECHO MEAS - ASC AORTA: 3.5 CM
BH CV ECHO MEAS - AVA(I,A): 1.9 CM^2
BH CV ECHO MEAS - AVA(I,D): 1.9 CM^2
BH CV ECHO MEAS - AVA(V,A): 1.8 CM^2
BH CV ECHO MEAS - AVA(V,D): 1.8 CM^2
BH CV ECHO MEAS - BSA(HAYCOCK): 2.4 M^2
BH CV ECHO MEAS - BSA: 2.2 M^2
BH CV ECHO MEAS - BZI_BMI: 35.9 KILOGRAMS/M^2
BH CV ECHO MEAS - BZI_METRIC_HEIGHT: 175.3 CM
BH CV ECHO MEAS - BZI_METRIC_WEIGHT: 110.2 KG
BH CV ECHO MEAS - EDV(CUBED): 64 ML
BH CV ECHO MEAS - EDV(MOD-SP4): 66 ML
BH CV ECHO MEAS - EDV(TEICH): 70 ML
BH CV ECHO MEAS - EF(CUBED): 81 %
BH CV ECHO MEAS - EF(MOD-BP): 69 %
BH CV ECHO MEAS - EF(MOD-SP4): 68.9 %
BH CV ECHO MEAS - EF(TEICH): 74.1 %
BH CV ECHO MEAS - ESV(CUBED): 12.2 ML
BH CV ECHO MEAS - ESV(MOD-SP4): 20.5 ML
BH CV ECHO MEAS - ESV(TEICH): 18.1 ML
BH CV ECHO MEAS - FS: 42.5 %
BH CV ECHO MEAS - IVS/LVPW: 1.1
BH CV ECHO MEAS - IVSD: 1.3 CM
BH CV ECHO MEAS - LA DIMENSION: 4.8 CM
BH CV ECHO MEAS - LA/AO: 1.3
BH CV ECHO MEAS - LAD MAJOR: 6.4 CM
BH CV ECHO MEAS - LAT PEAK E' VEL: 17.7 CM/SEC
BH CV ECHO MEAS - LATERAL E/E' RATIO: 6.8
BH CV ECHO MEAS - LV DIASTOLIC VOL/BSA (35-75): 29.4 ML/M^2
BH CV ECHO MEAS - LV IVRT: 0.07 SEC
BH CV ECHO MEAS - LV MASS(C)D: 175.8 GRAMS
BH CV ECHO MEAS - LV MASS(C)DI: 78.4 GRAMS/M^2
BH CV ECHO MEAS - LV MAX PG: 3.4 MMHG
BH CV ECHO MEAS - LV MEAN PG: 2 MMHG
BH CV ECHO MEAS - LV SYSTOLIC VOL/BSA (12-30): 9.1 ML/M^2
BH CV ECHO MEAS - LV V1 MAX: 92.5 CM/SEC
BH CV ECHO MEAS - LV V1 MEAN: 62.5 CM/SEC
BH CV ECHO MEAS - LV V1 VTI: 19.1 CM
BH CV ECHO MEAS - LVIDD: 4 CM
BH CV ECHO MEAS - LVIDS: 2.3 CM
BH CV ECHO MEAS - LVLD AP4: 6.5 CM
BH CV ECHO MEAS - LVLS AP4: 5.2 CM
BH CV ECHO MEAS - LVOT AREA (M): 3.1 CM^2
BH CV ECHO MEAS - LVOT AREA: 3.1 CM^2
BH CV ECHO MEAS - LVOT DIAM: 2 CM
BH CV ECHO MEAS - LVPWD: 1.2 CM
BH CV ECHO MEAS - MED PEAK E' VEL: 12.2 CM/SEC
BH CV ECHO MEAS - MEDIAL E/E' RATIO: 9.9
BH CV ECHO MEAS - MR MAX PG: 125 MMHG
BH CV ECHO MEAS - MR MAX VEL: 560 CM/SEC
BH CV ECHO MEAS - MV DEC SLOPE: 473 CM/SEC^2
BH CV ECHO MEAS - MV DEC TIME: 0.19 SEC
BH CV ECHO MEAS - MV E MAX VEL: 121 CM/SEC
BH CV ECHO MEAS - MV P1/2T MAX VEL: 121 CM/SEC
BH CV ECHO MEAS - MV P1/2T: 74.9 MSEC
BH CV ECHO MEAS - MVA P1/2T LCG: 1.8 CM^2
BH CV ECHO MEAS - MVA(P1/2T): 2.9 CM^2
BH CV ECHO MEAS - PA ACC TIME: 0.11 SEC
BH CV ECHO MEAS - PA MAX PG: 3.9 MMHG
BH CV ECHO MEAS - PA PR(ACCEL): 31.3 MMHG
BH CV ECHO MEAS - PA V2 MAX: 99.1 CM/SEC
BH CV ECHO MEAS - PI END-D VEL: 117 CM/SEC
BH CV ECHO MEAS - RAP SYSTOLE: 15 MMHG
BH CV ECHO MEAS - RVSP: 53 MMHG
BH CV ECHO MEAS - SI(AO): 156.2 ML/M^2
BH CV ECHO MEAS - SI(CUBED): 23.1 ML/M^2
BH CV ECHO MEAS - SI(LVOT): 26.7 ML/M^2
BH CV ECHO MEAS - SI(MOD-SP4): 20.3 ML/M^2
BH CV ECHO MEAS - SI(TEICH): 23.1 ML/M^2
BH CV ECHO MEAS - SV(AO): 350.4 ML
BH CV ECHO MEAS - SV(CUBED): 51.8 ML
BH CV ECHO MEAS - SV(LVOT): 60 ML
BH CV ECHO MEAS - SV(MOD-SP4): 45.5 ML
BH CV ECHO MEAS - SV(TEICH): 51.9 ML
BH CV ECHO MEAS - TAPSE (>1.6): 2 CM
BH CV ECHO MEAS - TR MAX PG: 38 MMHG
BH CV ECHO MEAS - TR MAX VEL: 307.3 CM/SEC
BH CV ECHO MEASUREMENTS AVERAGE E/E' RATIO: 8.09
BH CV VAS BP LEFT ARM: NORMAL MMHG
BH CV XLRA - RV BASE: 4.2 CM
BH CV XLRA - RV LENGTH: 7 CM
BH CV XLRA - RV MID: 3.5 CM
BH CV XLRA - TDI S': 14.6 CM/SEC
LEFT ATRIUM VOLUME INDEX: 34 ML/M2
LV EF 2D ECHO EST: 69 %
MAXIMAL PREDICTED HEART RATE: 137 BPM
STRESS TARGET HR: 116 BPM

## 2021-06-16 PROCEDURE — 93306 TTE W/DOPPLER COMPLETE: CPT

## 2021-06-16 PROCEDURE — 93306 TTE W/DOPPLER COMPLETE: CPT | Performed by: INTERNAL MEDICINE

## 2021-06-16 PROCEDURE — 25010000002 SULFUR HEXAFLUORIDE MICROSPH 60.7-25 MG RECONSTITUTED SUSPENSION: Performed by: INTERNAL MEDICINE

## 2021-06-16 RX ADMIN — SULFUR HEXAFLUORIDE 2 ML: KIT at 11:03

## 2021-09-09 ENCOUNTER — LAB (OUTPATIENT)
Dept: LAB | Facility: HOSPITAL | Age: 86
End: 2021-09-09

## 2021-09-09 ENCOUNTER — TRANSCRIBE ORDERS (OUTPATIENT)
Dept: LAB | Facility: HOSPITAL | Age: 86
End: 2021-09-09

## 2021-09-09 DIAGNOSIS — N18.30 STAGE 3 CHRONIC KIDNEY DISEASE, UNSPECIFIED WHETHER STAGE 3A OR 3B CKD (HCC): Primary | ICD-10-CM

## 2021-09-09 DIAGNOSIS — N18.30 STAGE 3 CHRONIC KIDNEY DISEASE, UNSPECIFIED WHETHER STAGE 3A OR 3B CKD (HCC): ICD-10-CM

## 2021-09-09 LAB
ALBUMIN SERPL-MCNC: 4.4 G/DL (ref 3.5–5.2)
ANION GAP SERPL CALCULATED.3IONS-SCNC: 8.9 MMOL/L (ref 5–15)
BASOPHILS # BLD AUTO: 0.06 10*3/MM3 (ref 0–0.2)
BASOPHILS NFR BLD AUTO: 1 % (ref 0–1.5)
BUN SERPL-MCNC: 43 MG/DL (ref 8–23)
BUN/CREAT SERPL: 24.3 (ref 7–25)
CALCIUM SPEC-SCNC: 9.9 MG/DL (ref 8.6–10.5)
CHLORIDE SERPL-SCNC: 103 MMOL/L (ref 98–107)
CO2 SERPL-SCNC: 27.1 MMOL/L (ref 22–29)
CREAT SERPL-MCNC: 1.77 MG/DL (ref 0.76–1.27)
DEPRECATED RDW RBC AUTO: 50.6 FL (ref 37–54)
EOSINOPHIL # BLD AUTO: 0.23 10*3/MM3 (ref 0–0.4)
EOSINOPHIL NFR BLD AUTO: 3.9 % (ref 0.3–6.2)
ERYTHROCYTE [DISTWIDTH] IN BLOOD BY AUTOMATED COUNT: 14.6 % (ref 12.3–15.4)
FERRITIN SERPL-MCNC: 241 NG/ML (ref 30–400)
GFR SERPL CREATININE-BSD FRML MDRD: 37 ML/MIN/1.73
GLUCOSE SERPL-MCNC: 102 MG/DL (ref 65–99)
HCT VFR BLD AUTO: 44.4 % (ref 37.5–51)
HGB BLD-MCNC: 14.6 G/DL (ref 13–17.7)
IMM GRANULOCYTES # BLD AUTO: 0.02 10*3/MM3 (ref 0–0.05)
IMM GRANULOCYTES NFR BLD AUTO: 0.3 % (ref 0–0.5)
IRON 24H UR-MRATE: 79 MCG/DL (ref 59–158)
IRON SATN MFR SERPL: 26 % (ref 20–50)
LYMPHOCYTES # BLD AUTO: 0.92 10*3/MM3 (ref 0.7–3.1)
LYMPHOCYTES NFR BLD AUTO: 15.5 % (ref 19.6–45.3)
MCH RBC QN AUTO: 31 PG (ref 26.6–33)
MCHC RBC AUTO-ENTMCNC: 32.9 G/DL (ref 31.5–35.7)
MCV RBC AUTO: 94.3 FL (ref 79–97)
MONOCYTES # BLD AUTO: 0.56 10*3/MM3 (ref 0.1–0.9)
MONOCYTES NFR BLD AUTO: 9.4 % (ref 5–12)
NEUTROPHILS NFR BLD AUTO: 4.16 10*3/MM3 (ref 1.7–7)
NEUTROPHILS NFR BLD AUTO: 69.9 % (ref 42.7–76)
NRBC BLD AUTO-RTO: 0 /100 WBC (ref 0–0.2)
PHOSPHATE SERPL-MCNC: 4.5 MG/DL (ref 2.5–4.5)
PLATELET # BLD AUTO: 159 10*3/MM3 (ref 140–450)
PMV BLD AUTO: 11 FL (ref 6–12)
POTASSIUM SERPL-SCNC: 4.2 MMOL/L (ref 3.5–5.2)
RBC # BLD AUTO: 4.71 10*6/MM3 (ref 4.14–5.8)
SODIUM SERPL-SCNC: 139 MMOL/L (ref 136–145)
TIBC SERPL-MCNC: 305 MCG/DL (ref 298–536)
TRANSFERRIN SERPL-MCNC: 205 MG/DL (ref 200–360)
WBC # BLD AUTO: 5.95 10*3/MM3 (ref 3.4–10.8)

## 2021-09-09 PROCEDURE — 82728 ASSAY OF FERRITIN: CPT

## 2021-09-09 PROCEDURE — 82306 VITAMIN D 25 HYDROXY: CPT

## 2021-09-09 PROCEDURE — 80069 RENAL FUNCTION PANEL: CPT

## 2021-09-09 PROCEDURE — 36415 COLL VENOUS BLD VENIPUNCTURE: CPT

## 2021-09-09 PROCEDURE — 82570 ASSAY OF URINE CREATININE: CPT

## 2021-09-09 PROCEDURE — 81001 URINALYSIS AUTO W/SCOPE: CPT

## 2021-09-09 PROCEDURE — 85025 COMPLETE CBC W/AUTO DIFF WBC: CPT

## 2021-09-09 PROCEDURE — 83540 ASSAY OF IRON: CPT

## 2021-09-09 PROCEDURE — 84156 ASSAY OF PROTEIN URINE: CPT

## 2021-09-09 PROCEDURE — 84466 ASSAY OF TRANSFERRIN: CPT

## 2021-09-10 LAB
25(OH)D3 SERPL-MCNC: 63.9 NG/ML (ref 30–100)
BACTERIA UR QL AUTO: NORMAL /HPF
BILIRUB UR QL STRIP: NEGATIVE
CLARITY UR: CLEAR
COLOR UR: YELLOW
CREAT UR-MCNC: 129.9 MG/DL
GLUCOSE UR STRIP-MCNC: NEGATIVE MG/DL
HGB UR QL STRIP.AUTO: NEGATIVE
HYALINE CASTS UR QL AUTO: NORMAL /LPF
KETONES UR QL STRIP: NEGATIVE
LEUKOCYTE ESTERASE UR QL STRIP.AUTO: NEGATIVE
NITRITE UR QL STRIP: NEGATIVE
PH UR STRIP.AUTO: 5.5 [PH] (ref 5–8)
PROT UR QL STRIP: ABNORMAL
PROT UR-MCNC: 152 MG/DL
PROT/CREAT UR: 1170.1 MG/G CREA (ref 0–200)
RBC # UR: NORMAL /HPF
REF LAB TEST METHOD: NORMAL
SP GR UR STRIP: 1.02 (ref 1–1.03)
SQUAMOUS #/AREA URNS HPF: NORMAL /HPF
UROBILINOGEN UR QL STRIP: ABNORMAL
WBC UR QL AUTO: NORMAL /HPF

## 2021-12-21 ENCOUNTER — TRANSCRIBE ORDERS (OUTPATIENT)
Dept: LAB | Facility: HOSPITAL | Age: 86
End: 2021-12-21

## 2021-12-21 ENCOUNTER — LAB (OUTPATIENT)
Dept: LAB | Facility: HOSPITAL | Age: 86
End: 2021-12-21

## 2021-12-21 DIAGNOSIS — R80.9 PROTEINURIA, UNSPECIFIED TYPE: ICD-10-CM

## 2021-12-21 DIAGNOSIS — E10.22 TYPE 1 DIABETES MELLITUS WITH DIABETIC CHRONIC KIDNEY DISEASE, UNSPECIFIED CKD STAGE (HCC): ICD-10-CM

## 2021-12-21 DIAGNOSIS — N18.32 CHRONIC KIDNEY DISEASE (CKD) STAGE G3B/A1, MODERATELY DECREASED GLOMERULAR FILTRATION RATE (GFR) BETWEEN 30-44 ML/MIN/1.73 SQUARE METER AND ALBUMINURIA CREATININE RATIO LESS THAN 30 MG/G (CMS/H* (HCC): Primary | ICD-10-CM

## 2021-12-21 LAB
ALBUMIN SERPL-MCNC: 4.1 G/DL (ref 3.5–5.2)
ANION GAP SERPL CALCULATED.3IONS-SCNC: 10.2 MMOL/L (ref 5–15)
BASOPHILS # BLD AUTO: 0.05 10*3/MM3 (ref 0–0.2)
BASOPHILS NFR BLD AUTO: 1 % (ref 0–1.5)
BUN SERPL-MCNC: 35 MG/DL (ref 8–23)
BUN/CREAT SERPL: 19 (ref 7–25)
CALCIUM SPEC-SCNC: 9.6 MG/DL (ref 8.6–10.5)
CHLORIDE SERPL-SCNC: 103 MMOL/L (ref 98–107)
CO2 SERPL-SCNC: 26.8 MMOL/L (ref 22–29)
CREAT SERPL-MCNC: 1.84 MG/DL (ref 0.76–1.27)
CREAT UR-MCNC: 83.5 MG/DL
DEPRECATED RDW RBC AUTO: 49.5 FL (ref 37–54)
EOSINOPHIL # BLD AUTO: 0.28 10*3/MM3 (ref 0–0.4)
EOSINOPHIL NFR BLD AUTO: 5.7 % (ref 0.3–6.2)
ERYTHROCYTE [DISTWIDTH] IN BLOOD BY AUTOMATED COUNT: 13.7 % (ref 12.3–15.4)
GFR SERPL CREATININE-BSD FRML MDRD: 35 ML/MIN/1.73
GLUCOSE SERPL-MCNC: 142 MG/DL (ref 65–99)
HBA1C MFR BLD: 5.95 % (ref 4.8–5.6)
HCT VFR BLD AUTO: 45.8 % (ref 37.5–51)
HGB BLD-MCNC: 15 G/DL (ref 13–17.7)
IMM GRANULOCYTES # BLD AUTO: 0.02 10*3/MM3 (ref 0–0.05)
IMM GRANULOCYTES NFR BLD AUTO: 0.4 % (ref 0–0.5)
LYMPHOCYTES # BLD AUTO: 0.77 10*3/MM3 (ref 0.7–3.1)
LYMPHOCYTES NFR BLD AUTO: 15.8 % (ref 19.6–45.3)
MCH RBC QN AUTO: 32.2 PG (ref 26.6–33)
MCHC RBC AUTO-ENTMCNC: 32.8 G/DL (ref 31.5–35.7)
MCV RBC AUTO: 98.3 FL (ref 79–97)
MONOCYTES # BLD AUTO: 0.37 10*3/MM3 (ref 0.1–0.9)
MONOCYTES NFR BLD AUTO: 7.6 % (ref 5–12)
NEUTROPHILS NFR BLD AUTO: 3.39 10*3/MM3 (ref 1.7–7)
NEUTROPHILS NFR BLD AUTO: 69.5 % (ref 42.7–76)
NRBC BLD AUTO-RTO: 0 /100 WBC (ref 0–0.2)
PHOSPHATE SERPL-MCNC: 3.6 MG/DL (ref 2.5–4.5)
PLATELET # BLD AUTO: 173 10*3/MM3 (ref 140–450)
PMV BLD AUTO: 11.2 FL (ref 6–12)
POTASSIUM SERPL-SCNC: 4.7 MMOL/L (ref 3.5–5.2)
PROT ?TM UR-MCNC: 111 MG/DL
RBC # BLD AUTO: 4.66 10*6/MM3 (ref 4.14–5.8)
SODIUM SERPL-SCNC: 140 MMOL/L (ref 136–145)
WBC NRBC COR # BLD: 4.88 10*3/MM3 (ref 3.4–10.8)

## 2021-12-21 PROCEDURE — 85025 COMPLETE CBC W/AUTO DIFF WBC: CPT | Performed by: INTERNAL MEDICINE

## 2021-12-21 PROCEDURE — 80069 RENAL FUNCTION PANEL: CPT | Performed by: INTERNAL MEDICINE

## 2021-12-21 PROCEDURE — 82570 ASSAY OF URINE CREATININE: CPT | Performed by: INTERNAL MEDICINE

## 2021-12-21 PROCEDURE — 87086 URINE CULTURE/COLONY COUNT: CPT | Performed by: INTERNAL MEDICINE

## 2021-12-21 PROCEDURE — 36415 COLL VENOUS BLD VENIPUNCTURE: CPT | Performed by: INTERNAL MEDICINE

## 2021-12-21 PROCEDURE — 81001 URINALYSIS AUTO W/SCOPE: CPT | Performed by: INTERNAL MEDICINE

## 2021-12-21 PROCEDURE — 83036 HEMOGLOBIN GLYCOSYLATED A1C: CPT | Performed by: INTERNAL MEDICINE

## 2021-12-21 PROCEDURE — 84156 ASSAY OF PROTEIN URINE: CPT | Performed by: INTERNAL MEDICINE

## 2021-12-22 LAB
BACTERIA UR QL AUTO: ABNORMAL /HPF
BILIRUB UR QL STRIP: NEGATIVE
CLARITY UR: CLEAR
COLOR UR: YELLOW
GLUCOSE UR STRIP-MCNC: ABNORMAL MG/DL
HGB UR QL STRIP.AUTO: NEGATIVE
HYALINE CASTS UR QL AUTO: ABNORMAL /LPF
KETONES UR QL STRIP: NEGATIVE
LEUKOCYTE ESTERASE UR QL STRIP.AUTO: ABNORMAL
NITRITE UR QL STRIP: NEGATIVE
PH UR STRIP.AUTO: 5.5 [PH] (ref 5–8)
PROT UR QL STRIP: ABNORMAL
RBC # UR STRIP: ABNORMAL /HPF
REF LAB TEST METHOD: ABNORMAL
SP GR UR STRIP: 1.02 (ref 1–1.03)
SQUAMOUS #/AREA URNS HPF: ABNORMAL /HPF
UROBILINOGEN UR QL STRIP: ABNORMAL
WBC # UR STRIP: ABNORMAL /HPF

## 2021-12-23 LAB — BACTERIA SPEC AEROBE CULT: NO GROWTH

## 2022-03-23 ENCOUNTER — TRANSCRIBE ORDERS (OUTPATIENT)
Dept: LAB | Facility: HOSPITAL | Age: 87
End: 2022-03-23

## 2022-03-23 ENCOUNTER — LAB (OUTPATIENT)
Dept: LAB | Facility: HOSPITAL | Age: 87
End: 2022-03-23

## 2022-03-23 DIAGNOSIS — R80.9 PROTEINURIA, UNSPECIFIED TYPE: Primary | ICD-10-CM

## 2022-03-23 DIAGNOSIS — R80.9 PROTEINURIA, UNSPECIFIED TYPE: ICD-10-CM

## 2022-03-23 LAB
ALBUMIN SERPL-MCNC: 4.3 G/DL (ref 3.5–5.2)
ANION GAP SERPL CALCULATED.3IONS-SCNC: 11 MMOL/L (ref 5–15)
BACTERIA UR QL AUTO: ABNORMAL /HPF
BILIRUB UR QL STRIP: NEGATIVE
BUN SERPL-MCNC: 37 MG/DL (ref 8–23)
BUN/CREAT SERPL: 17.7 (ref 7–25)
CALCIUM SPEC-SCNC: 9.5 MG/DL (ref 8.6–10.5)
CHLORIDE SERPL-SCNC: 103 MMOL/L (ref 98–107)
CLARITY UR: CLEAR
CO2 SERPL-SCNC: 25 MMOL/L (ref 22–29)
COLOR UR: YELLOW
CREAT SERPL-MCNC: 2.09 MG/DL (ref 0.76–1.27)
CREAT UR-MCNC: 55.5 MG/DL
EGFRCR SERPLBLD CKD-EPI 2021: 30.6 ML/MIN/1.73
GLUCOSE SERPL-MCNC: 107 MG/DL (ref 65–99)
GLUCOSE UR STRIP-MCNC: ABNORMAL MG/DL
HGB UR QL STRIP.AUTO: NEGATIVE
HYALINE CASTS UR QL AUTO: ABNORMAL /LPF
KETONES UR QL STRIP: NEGATIVE
LEUKOCYTE ESTERASE UR QL STRIP.AUTO: ABNORMAL
NITRITE UR QL STRIP: NEGATIVE
PH UR STRIP.AUTO: 6 [PH] (ref 5–8)
PHOSPHATE SERPL-MCNC: 3.7 MG/DL (ref 2.5–4.5)
POTASSIUM SERPL-SCNC: 4.3 MMOL/L (ref 3.5–5.2)
PROT ?TM UR-MCNC: 50 MG/DL
PROT UR QL STRIP: ABNORMAL
RBC # UR STRIP: ABNORMAL /HPF
REF LAB TEST METHOD: ABNORMAL
SODIUM SERPL-SCNC: 139 MMOL/L (ref 136–145)
SP GR UR STRIP: 1.02 (ref 1–1.03)
SQUAMOUS #/AREA URNS HPF: ABNORMAL /HPF
UROBILINOGEN UR QL STRIP: ABNORMAL
WBC # UR STRIP: ABNORMAL /HPF

## 2022-03-23 PROCEDURE — 36415 COLL VENOUS BLD VENIPUNCTURE: CPT

## 2022-03-23 PROCEDURE — 81001 URINALYSIS AUTO W/SCOPE: CPT | Performed by: STUDENT IN AN ORGANIZED HEALTH CARE EDUCATION/TRAINING PROGRAM

## 2022-03-23 PROCEDURE — 80069 RENAL FUNCTION PANEL: CPT

## 2022-03-23 PROCEDURE — 82570 ASSAY OF URINE CREATININE: CPT

## 2022-03-23 PROCEDURE — 84156 ASSAY OF PROTEIN URINE: CPT

## 2022-06-09 ENCOUNTER — TRANSCRIBE ORDERS (OUTPATIENT)
Dept: LAB | Facility: HOSPITAL | Age: 87
End: 2022-06-09

## 2022-06-09 ENCOUNTER — LAB (OUTPATIENT)
Dept: LAB | Facility: HOSPITAL | Age: 87
End: 2022-06-09

## 2022-06-09 DIAGNOSIS — N18.32 CHRONIC KIDNEY DISEASE (CKD) STAGE G3B/A1, MODERATELY DECREASED GLOMERULAR FILTRATION RATE (GFR) BETWEEN 30-44 ML/MIN/1.73 SQUARE METER AND ALBUMINURIA CREATININE RATIO LESS THAN 30 MG/G (CMS/H*: Primary | ICD-10-CM

## 2022-06-09 DIAGNOSIS — N18.32 CHRONIC KIDNEY DISEASE (CKD) STAGE G3B/A1, MODERATELY DECREASED GLOMERULAR FILTRATION RATE (GFR) BETWEEN 30-44 ML/MIN/1.73 SQUARE METER AND ALBUMINURIA CREATININE RATIO LESS THAN 30 MG/G (CMS/H*: ICD-10-CM

## 2022-06-09 LAB
BACTERIA UR QL AUTO: ABNORMAL /HPF
BASOPHILS # BLD AUTO: 0.06 10*3/MM3 (ref 0–0.2)
BASOPHILS NFR BLD AUTO: 1.3 % (ref 0–1.5)
BILIRUB UR QL STRIP: NEGATIVE
CLARITY UR: CLEAR
COLOR UR: YELLOW
CREAT UR-MCNC: 51 MG/DL
DEPRECATED RDW RBC AUTO: 47.9 FL (ref 37–54)
EOSINOPHIL # BLD AUTO: 0.26 10*3/MM3 (ref 0–0.4)
EOSINOPHIL NFR BLD AUTO: 5.7 % (ref 0.3–6.2)
ERYTHROCYTE [DISTWIDTH] IN BLOOD BY AUTOMATED COUNT: 14.2 % (ref 12.3–15.4)
GLUCOSE UR STRIP-MCNC: ABNORMAL MG/DL
HCT VFR BLD AUTO: 46.4 % (ref 37.5–51)
HGB BLD-MCNC: 15.4 G/DL (ref 13–17.7)
HGB UR QL STRIP.AUTO: NEGATIVE
HYALINE CASTS UR QL AUTO: ABNORMAL /LPF
IMM GRANULOCYTES # BLD AUTO: 0.02 10*3/MM3 (ref 0–0.05)
IMM GRANULOCYTES NFR BLD AUTO: 0.4 % (ref 0–0.5)
KETONES UR QL STRIP: NEGATIVE
LEUKOCYTE ESTERASE UR QL STRIP.AUTO: ABNORMAL
LYMPHOCYTES # BLD AUTO: 0.99 10*3/MM3 (ref 0.7–3.1)
LYMPHOCYTES NFR BLD AUTO: 21.8 % (ref 19.6–45.3)
MCH RBC QN AUTO: 30.9 PG (ref 26.6–33)
MCHC RBC AUTO-ENTMCNC: 33.2 G/DL (ref 31.5–35.7)
MCV RBC AUTO: 93.2 FL (ref 79–97)
MONOCYTES # BLD AUTO: 0.52 10*3/MM3 (ref 0.1–0.9)
MONOCYTES NFR BLD AUTO: 11.5 % (ref 5–12)
NEUTROPHILS NFR BLD AUTO: 2.69 10*3/MM3 (ref 1.7–7)
NEUTROPHILS NFR BLD AUTO: 59.3 % (ref 42.7–76)
NITRITE UR QL STRIP: NEGATIVE
NRBC BLD AUTO-RTO: 0 /100 WBC (ref 0–0.2)
PH UR STRIP.AUTO: 5.5 [PH] (ref 5–8)
PLATELET # BLD AUTO: 174 10*3/MM3 (ref 140–450)
PMV BLD AUTO: 10.8 FL (ref 6–12)
PROT ?TM UR-MCNC: 41.8 MG/DL
PROT UR QL STRIP: ABNORMAL
RBC # BLD AUTO: 4.98 10*6/MM3 (ref 4.14–5.8)
RBC # UR STRIP: ABNORMAL /HPF
REF LAB TEST METHOD: ABNORMAL
SP GR UR STRIP: 1.02 (ref 1–1.03)
SQUAMOUS #/AREA URNS HPF: ABNORMAL /HPF
UROBILINOGEN UR QL STRIP: ABNORMAL
WBC # UR STRIP: ABNORMAL /HPF
WBC NRBC COR # BLD: 4.54 10*3/MM3 (ref 3.4–10.8)

## 2022-06-09 PROCEDURE — 85025 COMPLETE CBC W/AUTO DIFF WBC: CPT

## 2022-06-09 PROCEDURE — 82570 ASSAY OF URINE CREATININE: CPT

## 2022-06-09 PROCEDURE — 36415 COLL VENOUS BLD VENIPUNCTURE: CPT

## 2022-06-09 PROCEDURE — 80069 RENAL FUNCTION PANEL: CPT

## 2022-06-09 PROCEDURE — 81001 URINALYSIS AUTO W/SCOPE: CPT | Performed by: STUDENT IN AN ORGANIZED HEALTH CARE EDUCATION/TRAINING PROGRAM

## 2022-06-09 PROCEDURE — 84156 ASSAY OF PROTEIN URINE: CPT

## 2022-06-10 LAB
ALBUMIN SERPL-MCNC: 4 G/DL (ref 3.5–5.2)
ANION GAP SERPL CALCULATED.3IONS-SCNC: 12.8 MMOL/L (ref 5–15)
BUN SERPL-MCNC: 39 MG/DL (ref 8–23)
BUN/CREAT SERPL: 18.8 (ref 7–25)
CALCIUM SPEC-SCNC: 9.7 MG/DL (ref 8.6–10.5)
CHLORIDE SERPL-SCNC: 103 MMOL/L (ref 98–107)
CO2 SERPL-SCNC: 23.2 MMOL/L (ref 22–29)
CREAT SERPL-MCNC: 2.07 MG/DL (ref 0.76–1.27)
EGFRCR SERPLBLD CKD-EPI 2021: 31 ML/MIN/1.73
GLUCOSE SERPL-MCNC: 95 MG/DL (ref 65–99)
PHOSPHATE SERPL-MCNC: 3.5 MG/DL (ref 2.5–4.5)
POTASSIUM SERPL-SCNC: 4.3 MMOL/L (ref 3.5–5.2)
SODIUM SERPL-SCNC: 139 MMOL/L (ref 136–145)

## 2022-08-15 ENCOUNTER — TRANSCRIBE ORDERS (OUTPATIENT)
Dept: ADMINISTRATIVE | Facility: HOSPITAL | Age: 87
End: 2022-08-15

## 2022-08-15 DIAGNOSIS — R10.9 ACUTE ABDOMINAL PAIN: Primary | ICD-10-CM

## 2022-08-16 ENCOUNTER — TRANSCRIBE ORDERS (OUTPATIENT)
Dept: ADMINISTRATIVE | Facility: HOSPITAL | Age: 87
End: 2022-08-16

## 2022-08-16 DIAGNOSIS — R10.9 ABDOMINAL PAIN, UNSPECIFIED ABDOMINAL LOCATION: Primary | ICD-10-CM

## 2022-08-18 ENCOUNTER — HOSPITAL ENCOUNTER (INPATIENT)
Facility: HOSPITAL | Age: 87
LOS: 4 days | Discharge: HOME | End: 2022-08-22
Attending: EMERGENCY MEDICINE | Admitting: INTERNAL MEDICINE

## 2022-08-18 ENCOUNTER — HOSPITAL ENCOUNTER (OUTPATIENT)
Dept: CT IMAGING | Facility: HOSPITAL | Age: 87
Discharge: HOME | End: 2022-08-18
Admitting: INTERNAL MEDICINE

## 2022-08-18 DIAGNOSIS — A41.9 SEPSIS WITHOUT ACUTE ORGAN DYSFUNCTION, DUE TO UNSPECIFIED ORGANISM: Primary | ICD-10-CM

## 2022-08-18 DIAGNOSIS — A41.9 SEPSIS, DUE TO UNSPECIFIED ORGANISM, UNSPECIFIED WHETHER ACUTE ORGAN DYSFUNCTION PRESENT: ICD-10-CM

## 2022-08-18 DIAGNOSIS — N17.9 ACUTE RENAL FAILURE, UNSPECIFIED ACUTE RENAL FAILURE TYPE: ICD-10-CM

## 2022-08-18 DIAGNOSIS — R10.9 ABDOMINAL PAIN, UNSPECIFIED ABDOMINAL LOCATION: ICD-10-CM

## 2022-08-18 DIAGNOSIS — K81.0 ACUTE CHOLECYSTITIS: ICD-10-CM

## 2022-08-18 LAB
ALBUMIN SERPL-MCNC: 3.5 G/DL (ref 3.5–5.2)
ALBUMIN/GLOB SERPL: 1.5 G/DL
ALP SERPL-CCNC: 107 U/L (ref 39–117)
ALT SERPL W P-5'-P-CCNC: 39 U/L (ref 1–41)
ANION GAP SERPL CALCULATED.3IONS-SCNC: 11 MMOL/L (ref 5–15)
AST SERPL-CCNC: 84 U/L (ref 1–40)
BASOPHILS # BLD AUTO: 0.04 10*3/MM3 (ref 0–0.2)
BASOPHILS NFR BLD AUTO: 0.3 % (ref 0–1.5)
BILIRUB SERPL-MCNC: 0.9 MG/DL (ref 0–1.2)
BUN SERPL-MCNC: 65 MG/DL (ref 8–23)
BUN/CREAT SERPL: 17.2 (ref 7–25)
CALCIUM SPEC-SCNC: 9.1 MG/DL (ref 8.6–10.5)
CHLORIDE SERPL-SCNC: 94 MMOL/L (ref 98–107)
CO2 SERPL-SCNC: 26 MMOL/L (ref 22–29)
CREAT SERPL-MCNC: 3.77 MG/DL (ref 0.76–1.27)
D-LACTATE SERPL-SCNC: 1.4 MMOL/L (ref 0.5–2)
DEPRECATED RDW RBC AUTO: 51.8 FL (ref 37–54)
EGFRCR SERPLBLD CKD-EPI 2021: 15.1 ML/MIN/1.73
EOSINOPHIL # BLD AUTO: 0.15 10*3/MM3 (ref 0–0.4)
EOSINOPHIL NFR BLD AUTO: 1.2 % (ref 0.3–6.2)
ERYTHROCYTE [DISTWIDTH] IN BLOOD BY AUTOMATED COUNT: 15.3 % (ref 12.3–15.4)
FLUAV RNA RESP QL NAA+PROBE: NOT DETECTED
FLUBV RNA RESP QL NAA+PROBE: NOT DETECTED
GLOBULIN UR ELPH-MCNC: 2.4 GM/DL
GLUCOSE SERPL-MCNC: 112 MG/DL (ref 65–99)
HCT VFR BLD AUTO: 41.4 % (ref 37.5–51)
HGB BLD-MCNC: 14.2 G/DL (ref 13–17.7)
HOLD SPECIMEN: NORMAL
IMM GRANULOCYTES # BLD AUTO: 0.03 10*3/MM3 (ref 0–0.05)
IMM GRANULOCYTES NFR BLD AUTO: 0.2 % (ref 0–0.5)
LIPASE SERPL-CCNC: 18 U/L (ref 13–60)
LYMPHOCYTES # BLD AUTO: 0.35 10*3/MM3 (ref 0.7–3.1)
LYMPHOCYTES NFR BLD AUTO: 2.8 % (ref 19.6–45.3)
MCH RBC QN AUTO: 31.6 PG (ref 26.6–33)
MCHC RBC AUTO-ENTMCNC: 34.3 G/DL (ref 31.5–35.7)
MCV RBC AUTO: 92 FL (ref 79–97)
MONOCYTES # BLD AUTO: 1.1 10*3/MM3 (ref 0.1–0.9)
MONOCYTES NFR BLD AUTO: 8.8 % (ref 5–12)
NEUTROPHILS NFR BLD AUTO: 10.84 10*3/MM3 (ref 1.7–7)
NEUTROPHILS NFR BLD AUTO: 86.7 % (ref 42.7–76)
NRBC BLD AUTO-RTO: 0 /100 WBC (ref 0–0.2)
PLATELET # BLD AUTO: 126 10*3/MM3 (ref 140–450)
PMV BLD AUTO: 11.4 FL (ref 6–12)
POTASSIUM SERPL-SCNC: 4.8 MMOL/L (ref 3.5–5.2)
PROCALCITONIN SERPL-MCNC: 9.94 NG/ML (ref 0–0.25)
PROT SERPL-MCNC: 5.9 G/DL (ref 6–8.5)
RBC # BLD AUTO: 4.5 10*6/MM3 (ref 4.14–5.8)
SARS-COV-2 RNA RESP QL NAA+PROBE: NOT DETECTED
SODIUM SERPL-SCNC: 131 MMOL/L (ref 136–145)
WBC NRBC COR # BLD: 12.51 10*3/MM3 (ref 3.4–10.8)
WHOLE BLOOD HOLD COAG: NORMAL
WHOLE BLOOD HOLD SPECIMEN: NORMAL

## 2022-08-18 PROCEDURE — 84145 PROCALCITONIN (PCT): CPT | Performed by: PHYSICIAN ASSISTANT

## 2022-08-18 PROCEDURE — 80053 COMPREHEN METABOLIC PANEL: CPT | Performed by: EMERGENCY MEDICINE

## 2022-08-18 PROCEDURE — 36415 COLL VENOUS BLD VENIPUNCTURE: CPT

## 2022-08-18 PROCEDURE — 25010000002 MEROPENEM PER 100 MG: Performed by: PHYSICIAN ASSISTANT

## 2022-08-18 PROCEDURE — 99223 1ST HOSP IP/OBS HIGH 75: CPT | Performed by: INTERNAL MEDICINE

## 2022-08-18 PROCEDURE — 25010000002 IOPAMIDOL 61 % SOLUTION: Performed by: INTERNAL MEDICINE

## 2022-08-18 PROCEDURE — 25010000002 ERTAPENEM PER 500 MG: Performed by: INTERNAL MEDICINE

## 2022-08-18 PROCEDURE — 83605 ASSAY OF LACTIC ACID: CPT | Performed by: EMERGENCY MEDICINE

## 2022-08-18 PROCEDURE — 85025 COMPLETE CBC W/AUTO DIFF WBC: CPT | Performed by: EMERGENCY MEDICINE

## 2022-08-18 PROCEDURE — 87636 SARSCOV2 & INF A&B AMP PRB: CPT | Performed by: EMERGENCY MEDICINE

## 2022-08-18 PROCEDURE — 83690 ASSAY OF LIPASE: CPT | Performed by: EMERGENCY MEDICINE

## 2022-08-18 PROCEDURE — 87040 BLOOD CULTURE FOR BACTERIA: CPT | Performed by: PHYSICIAN ASSISTANT

## 2022-08-18 PROCEDURE — 74160 CT ABDOMEN W/CONTRAST: CPT

## 2022-08-18 PROCEDURE — 99284 EMERGENCY DEPT VISIT MOD MDM: CPT

## 2022-08-18 RX ORDER — SODIUM CHLORIDE 9 MG/ML
100 INJECTION, SOLUTION INTRAVENOUS CONTINUOUS
Status: DISCONTINUED | OUTPATIENT
Start: 2022-08-18 | End: 2022-08-19

## 2022-08-18 RX ORDER — AMLODIPINE BESYLATE 5 MG/1
5 TABLET ORAL DAILY
Status: CANCELLED | OUTPATIENT
Start: 2022-08-18

## 2022-08-18 RX ORDER — ONDANSETRON 2 MG/ML
4 INJECTION INTRAMUSCULAR; INTRAVENOUS EVERY 6 HOURS PRN
Status: DISCONTINUED | OUTPATIENT
Start: 2022-08-18 | End: 2022-08-21

## 2022-08-18 RX ORDER — SODIUM CHLORIDE 9 MG/ML
10 INJECTION INTRAVENOUS AS NEEDED
Status: DISCONTINUED | OUTPATIENT
Start: 2022-08-18 | End: 2022-08-22 | Stop reason: HOSPADM

## 2022-08-18 RX ORDER — SODIUM CHLORIDE 0.9 % (FLUSH) 0.9 %
10 SYRINGE (ML) INJECTION AS NEEDED
Status: DISCONTINUED | OUTPATIENT
Start: 2022-08-18 | End: 2022-08-22 | Stop reason: HOSPADM

## 2022-08-18 RX ORDER — DEXTROSE MONOHYDRATE 25 G/50ML
25 INJECTION, SOLUTION INTRAVENOUS
Status: DISCONTINUED | OUTPATIENT
Start: 2022-08-18 | End: 2022-08-22 | Stop reason: HOSPADM

## 2022-08-18 RX ORDER — NALOXONE HCL 0.4 MG/ML
0.4 VIAL (ML) INJECTION
Status: DISCONTINUED | OUTPATIENT
Start: 2022-08-18 | End: 2022-08-22 | Stop reason: HOSPADM

## 2022-08-18 RX ORDER — NICOTINE POLACRILEX 4 MG
15 LOZENGE BUCCAL
Status: DISCONTINUED | OUTPATIENT
Start: 2022-08-18 | End: 2022-08-22 | Stop reason: HOSPADM

## 2022-08-18 RX ORDER — MAGNESIUM OXIDE 400 MG/1
400 TABLET ORAL DAILY
COMMUNITY

## 2022-08-18 RX ORDER — TOLTERODINE TARTRATE 2 MG/1
2 TABLET, EXTENDED RELEASE ORAL 2 TIMES DAILY
COMMUNITY

## 2022-08-18 RX ORDER — SODIUM CHLORIDE 0.9 % (FLUSH) 0.9 %
10 SYRINGE (ML) INJECTION EVERY 12 HOURS SCHEDULED
Status: DISCONTINUED | OUTPATIENT
Start: 2022-08-18 | End: 2022-08-22 | Stop reason: HOSPADM

## 2022-08-18 RX ORDER — ACETAMINOPHEN 325 MG/1
650 TABLET ORAL EVERY 4 HOURS PRN
Status: DISCONTINUED | OUTPATIENT
Start: 2022-08-18 | End: 2022-08-22 | Stop reason: HOSPADM

## 2022-08-18 RX ORDER — ROSUVASTATIN CALCIUM 20 MG/1
20 TABLET, COATED ORAL DAILY
Status: DISCONTINUED | OUTPATIENT
Start: 2022-08-19 | End: 2022-08-19

## 2022-08-18 RX ORDER — HYDROMORPHONE HYDROCHLORIDE 1 MG/ML
0.5 INJECTION, SOLUTION INTRAMUSCULAR; INTRAVENOUS; SUBCUTANEOUS
Status: DISCONTINUED | OUTPATIENT
Start: 2022-08-18 | End: 2022-08-22 | Stop reason: HOSPADM

## 2022-08-18 RX ORDER — ROSUVASTATIN CALCIUM 20 MG/1
20 TABLET, COATED ORAL DAILY
COMMUNITY

## 2022-08-18 RX ORDER — AMLODIPINE BESYLATE 5 MG/1
5 TABLET ORAL DAILY
Status: ON HOLD | COMMUNITY
End: 2022-08-22 | Stop reason: SDUPTHER

## 2022-08-18 RX ORDER — INSULIN LISPRO 100 [IU]/ML
0-7 INJECTION, SOLUTION INTRAVENOUS; SUBCUTANEOUS
Status: DISCONTINUED | OUTPATIENT
Start: 2022-08-19 | End: 2022-08-22 | Stop reason: HOSPADM

## 2022-08-18 RX ADMIN — SODIUM CHLORIDE 100 ML/HR: 9 INJECTION, SOLUTION INTRAVENOUS at 18:15

## 2022-08-18 RX ADMIN — SODIUM CHLORIDE 100 ML/HR: 9 INJECTION, SOLUTION INTRAVENOUS at 23:14

## 2022-08-18 RX ADMIN — IOPAMIDOL 60 ML: 612 INJECTION, SOLUTION INTRAVENOUS at 15:05

## 2022-08-18 RX ADMIN — Medication 10 ML: at 23:12

## 2022-08-18 RX ADMIN — ERTAPENEM 500 MG: 1 INJECTION INTRAMUSCULAR; INTRAVENOUS at 23:13

## 2022-08-18 RX ADMIN — MEROPENEM 1 G: 1 INJECTION, POWDER, FOR SOLUTION INTRAVENOUS at 19:21

## 2022-08-18 NOTE — ED PROVIDER NOTES
EMERGENCY DEPARTMENT ENCOUNTER    Pt Name: Lio Griffith  MRN: [id number]  Pt :   1935  Room Number:  PILY/PILY  Date of encounter:  2022  PCP: Johnnie Wylie MD  ED Provider: Harlan Quinonez PA-C    Historian: Patient      HPI:  Chief Complaint: Sent by PCP for CT confirmed cholecystitis        Context: Lio Griffith is a 86 y.o. male who presents to the ED c/o onset Saturday of sharp colicky right upper quadrant abdominal pain.  Pain was mostly centered in the epigastrium but radiated to the right.  No nausea vomiting, no fevers chills or sweats.  No pale chalky or anne colored stool.      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 Good control         PAST SURGICAL HISTORY  Past Surgical History:   Procedure Laterality Date   • ADENOIDECTOMY     • APPENDECTOMY     • HIP SURGERY Right 2013    Right JOYA    • JOINT REPLACEMENT     • THYROIDECTOMY     • TONSILLECTOMY     • VITRECTOMY PARS PLANA W/ REPAIR OF MACULAR HOLE           FAMILY HISTORY  Family History   Problem Relation Age of Onset   • Cancer Father    • Diabetes Brother    • Hypertension Brother    • Obesity Brother          SOCIAL HISTORY  Social History     Socioeconomic History   • Marital status:    Tobacco Use   • Smoking status: Former Smoker     Packs/day: 0.50     Types: Cigarettes     Start date:      Quit date:      Years since quittin.6   • Smokeless tobacco: Never Used   Substance and Sexual Activity   • Alcohol use: No   • Drug use: No   • Sexual activity: Defer         ALLERGIES  Dicloxacillin        REVIEW OF SYSTEMS  Review of Systems   Constitutional: Positive for activity change, appetite change and fatigue. Negative for chills, diaphoresis and fever.   HENT: Negative for congestion, rhinorrhea  and sore throat.    Respiratory: Negative for cough, shortness of breath and wheezing.    Cardiovascular: Negative for chest pain and palpitations.   Gastrointestinal: Positive for abdominal pain and nausea. Negative for vomiting.   Genitourinary: Negative for dysuria, flank pain and hematuria.   Musculoskeletal: Negative for arthralgias, back pain, myalgias and neck pain.   Neurological: Negative for dizziness, syncope and headaches.          All systems reviewed and negative except for those discussed in HPI.       PHYSICAL EXAM    I have reviewed the triage vital signs and nursing notes.    ED Triage Vitals [08/18/22 1601]   Temp Heart Rate Resp BP SpO2   98.5 °F (36.9 °C) 96 18 117/56 96 %      Temp src Heart Rate Source Patient Position BP Location FiO2 (%)   Oral Monitor Sitting Right arm --       Physical Exam  GENERAL: Pleasant awake alert and oriented nontoxic-appearing afebrile hemodynamically stable, not in acute distress.  HENT: Nares patent.  EYES: No scleral icterus.  CV: Regular rhythm, regular rate.  RESPIRATORY: Normal effort.  No audible wheezes, rales or rhonchi.  ABDOMEN: Soft, mild right upper quadrant tenderness, no guarding or rebound tenderness.  Bowel sounds are normal.  No palpable organomegaly or pulsatile masses.  No CVA or suprapubic tenderness.  MUSCULOSKELETAL: No deformities.   NEURO: Alert, moves all extremities, follows commands.  SKIN: Warm, dry, no rash visualized.        LAB RESULTS  Recent Results (from the past 24 hour(s))   Comprehensive Metabolic Panel    Collection Time: 08/18/22  5:52 PM    Specimen: Blood   Result Value Ref Range    Glucose 112 (H) 65 - 99 mg/dL    BUN 65 (H) 8 - 23 mg/dL    Creatinine 3.77 (H) 0.76 - 1.27 mg/dL    Sodium 131 (L) 136 - 145 mmol/L    Potassium 4.8 3.5 - 5.2 mmol/L    Chloride 94 (L) 98 - 107 mmol/L    CO2 26.0 22.0 - 29.0 mmol/L    Calcium 9.1 8.6 - 10.5 mg/dL    Total Protein 5.9 (L) 6.0 - 8.5 g/dL    Albumin 3.50 3.50 - 5.20 g/dL    ALT  (SGPT) 39 1 - 41 U/L    AST (SGOT) 84 (H) 1 - 40 U/L    Alkaline Phosphatase 107 39 - 117 U/L    Total Bilirubin 0.9 0.0 - 1.2 mg/dL    Globulin 2.4 gm/dL    A/G Ratio 1.5 g/dL    BUN/Creatinine Ratio 17.2 7.0 - 25.0    Anion Gap 11.0 5.0 - 15.0 mmol/L    eGFR 15.1 (L) >60.0 mL/min/1.73   Lipase    Collection Time: 08/18/22  5:52 PM    Specimen: Blood   Result Value Ref Range    Lipase 18 13 - 60 U/L   Lactic Acid, Plasma    Collection Time: 08/18/22  5:52 PM    Specimen: Blood   Result Value Ref Range    Lactate 1.4 0.5 - 2.0 mmol/L   Green Top (Gel)    Collection Time: 08/18/22  5:52 PM   Result Value Ref Range    Extra Tube Hold for add-ons.    Lavender Top    Collection Time: 08/18/22  5:52 PM   Result Value Ref Range    Extra Tube hold for add-on    Gold Top - SST    Collection Time: 08/18/22  5:52 PM   Result Value Ref Range    Extra Tube Hold for add-ons.    Light Blue Top    Collection Time: 08/18/22  5:52 PM   Result Value Ref Range    Extra Tube Hold for add-ons.    CBC Auto Differential    Collection Time: 08/18/22  5:52 PM    Specimen: Blood   Result Value Ref Range    WBC 12.51 (H) 3.40 - 10.80 10*3/mm3    RBC 4.50 4.14 - 5.80 10*6/mm3    Hemoglobin 14.2 13.0 - 17.7 g/dL    Hematocrit 41.4 37.5 - 51.0 %    MCV 92.0 79.0 - 97.0 fL    MCH 31.6 26.6 - 33.0 pg    MCHC 34.3 31.5 - 35.7 g/dL    RDW 15.3 12.3 - 15.4 %    RDW-SD 51.8 37.0 - 54.0 fl    MPV 11.4 6.0 - 12.0 fL    Platelets 126 (L) 140 - 450 10*3/mm3    Neutrophil % 86.7 (H) 42.7 - 76.0 %    Lymphocyte % 2.8 (L) 19.6 - 45.3 %    Monocyte % 8.8 5.0 - 12.0 %    Eosinophil % 1.2 0.3 - 6.2 %    Basophil % 0.3 0.0 - 1.5 %    Immature Grans % 0.2 0.0 - 0.5 %    Neutrophils, Absolute 10.84 (H) 1.70 - 7.00 10*3/mm3    Lymphocytes, Absolute 0.35 (L) 0.70 - 3.10 10*3/mm3    Monocytes, Absolute 1.10 (H) 0.10 - 0.90 10*3/mm3    Eosinophils, Absolute 0.15 0.00 - 0.40 10*3/mm3    Basophils, Absolute 0.04 0.00 - 0.20 10*3/mm3    Immature Grans, Absolute 0.03 0.00  - 0.05 10*3/mm3    nRBC 0.0 0.0 - 0.2 /100 WBC   Procalcitonin    Collection Time: 08/18/22  5:52 PM    Specimen: Blood   Result Value Ref Range    Procalcitonin 9.94 (H) 0.00 - 0.25 ng/mL   COVID-19 and FLU A/B PCR - Swab, Nasopharynx    Collection Time: 08/18/22  5:53 PM    Specimen: Nasopharynx; Swab   Result Value Ref Range    COVID19 Not Detected Not Detected - Ref. Range    Influenza A PCR Not Detected Not Detected    Influenza B PCR Not Detected Not Detected       If labs were ordered, I independently reviewed the results.        RADIOLOGY  CT Abdomen With Contrast    Result Date: 8/18/2022  DATE OF EXAM: 8/18/2022 2:51 PM  PROCEDURE: CT ABDOMEN W CONTRAST-  INDICATIONS: R10.9; R10.9-Unspecified abdominal pain  COMPARISON: 3/15/2016  TECHNIQUE: Routine transaxial slices were obtained through the abdomen after the intravenous administration of 60 mL of Isovue 300. Reconstructed coronal and sagittal images were also obtained. Automated exposure control and iterative construction methods were used.  The radiation dose reduction device was turned on for each scan per the ALARA (As Low as Reasonably Achievable) protocol.  FINDINGS: The gallbladder is markedly inflamed, thick-walled and edematous, with average gallbladder wall thickness of almost 2 cm. A large calcified gallstone is again seen, as on prior study, measuring up to 3.5 cm in diameter. There is moderately extensive surrounding inflammatory fat stranding, and mild secondary inflammation of the adjacent second portion of the duodenum. There is no evidence of bowel obstruction. Oral contrast passes normally through the stomach, duodenum and into the jejunum.  Elsewhere at the level of this scan, the included lower lungs appear clear. There is diffuse fatty liver change. Multiple bilateral renal cysts are again noted as well as a couple of nonobstructing left lower pole renal calculi up to 3 or 4 mm in maximal diameter. Spleen and adrenal glands appear  within normal limits.  Pancreas is relatively atrophic, as is often seen in a patient this age, however, today's study now shows isolated marked dilatation of pancreatic duct confined to the tip of the pancreatic tail, up to approximately 8 mm in diameter as visible on images 38 through 46. There are associated calcifications and findings could reflect sequelae of previous pancreatitis. An underlying pancreatic mass cannot be entirely excluded and there is a focal dense area of parenchyma at the level of potential ductal obstruction where an underlying isodense nodule up to 14 mm in direct be present, although no well-defined mass is seen. Please refer to axial image 47 of series 2. No active inflammation is seen here.  No free air ascites, adenopathy or other acute inflammatory focus is seen. Delayed venous phase images show no evidence of obstructive uropathy. Bony structures appear to be intact. There is advanced multilevel lumbar degenerative disc disease.             1. Findings consistent with acute cholecystitis with markedly thickened and edematous gallbladder wall, 3.5 cm gallstone, and surrounding inflammatory change. No evidence of intra or extrahepatic biliary ductal dilatation. 2. Atrophic appearance of the pancreatic tail, associated marked ductal dilatation of the pancreatic tail only, and associated pancreatic parenchymal calcifications. These findings could reflect sequelae of previous pancreatitis, possibly ductal obstruction due to small occult neoplasm of the junction of the body and tail the pancreas, although no well-defined pancreatic lesion is seen. Stricture from prior pancreatitis might cause this appearance. Similar changes are present on the patient's multiple prior unenhanced exams, but are more visible today as that this is the only contrast enhanced scan of the abdomen. Chronic disease is favored recent ductal obstruction. 3. Multiple renal cysts and small nonobstructing left lower  pole renal calculi. No evidence of obstructive uropathy.    Note: Phone findings were called to Dr. Wylie nurse at approximately 3:15 PM 8/18/2022. They will contact the patient with further instructions, while he remains at the outpatient imaging Center.  This report was finalized on 8/18/2022 3:22 PM by Dr. Parish Granados MD.          PROCEDURES    Procedures    No orders to display       MEDICATIONS GIVEN IN ER    Medications   Sodium Chloride (PF) 0.9 % 10 mL (has no administration in time range)   sodium chloride 0.9 % flush 10 mL (has no administration in time range)   sodium chloride 0.9 % infusion (100 mL/hr Intravenous Currently Infusing 8/18/22 2018)   meropenem (MERREM) 1 g/100 mL 0.9% NS (mbp) (0 g Intravenous Stopped 8/18/22 2010)         PROGRESS, DATA ANALYSIS, CONSULTS, AND MEDICAL DECISION MAKING    All labs have been independently reviewed by me.  All radiology studies have been reviewed by me and the radiologist dictating the report.   EKG's have been independently viewed and interpreted by me.      Differential diagnoses: Cholecystitis, choledocholithiasis, other biliary outlet obstruction      ED Course as of 08/18/22 2128   Thu Aug 18, 2022   1822 WBC(!): 12.51 [TG]   1855 Procalcitonin(!): 9.94 [TG]   1855 BUN(!): 65 [TG]   1855 Creatinine(!): 3.77 [TG]   1856 eGFR(!): 15.1 [TG]      ED Course User Index  [TG] Harlan Quinonez PA-C       Patient has CT findings consistent with acute cholecystitis, procalcitonin 9.94 consistent with sepsis, he is also in acute renal failure.  Discussed with Dr. Brownlee, who is aware of patient, discussed with Dr. Contreras who accepts patient to hospitalist service.  Merrem initiated here in the emergency department.    AS OF 21:28 EDT VITALS:    BP - 122/76  HR - 88  TEMP - 98.5 °F (36.9 °C) (Oral)  O2 SATS - 92%                  DIAGNOSIS  Final diagnoses:   Sepsis without acute organ dysfunction, due to unspecified organism (HCC)   Acute cholecystitis    Acute renal failure, unspecified acute renal failure type (HCC)         DISPOSITION  Admit to hospitalist service, general surgery consulting.           Harlan Quinonez PA-C  08/18/22 2127       Harlan Quinonez PA-C  08/18/22 2128

## 2022-08-19 ENCOUNTER — ANESTHESIA EVENT (OUTPATIENT)
Dept: PERIOP | Facility: HOSPITAL | Age: 87
End: 2022-08-19

## 2022-08-19 ENCOUNTER — ANESTHESIA (OUTPATIENT)
Dept: PERIOP | Facility: HOSPITAL | Age: 87
End: 2022-08-19

## 2022-08-19 LAB
ABO GROUP BLD: NORMAL
ALBUMIN SERPL-MCNC: 2.6 G/DL (ref 3.5–5.2)
ALBUMIN/GLOB SERPL: 0.8 G/DL
ALP SERPL-CCNC: 139 U/L (ref 39–117)
ALT SERPL W P-5'-P-CCNC: 40 U/L (ref 1–41)
ANION GAP SERPL CALCULATED.3IONS-SCNC: 12 MMOL/L (ref 5–15)
AST SERPL-CCNC: 74 U/L (ref 1–40)
BACTERIA UR QL AUTO: ABNORMAL /HPF
BASOPHILS # BLD AUTO: 0.02 10*3/MM3 (ref 0–0.2)
BASOPHILS NFR BLD AUTO: 0.2 % (ref 0–1.5)
BH BB BLOOD EXPIRATION DATE: NORMAL
BH BB BLOOD TYPE BARCODE: 6200
BH BB DISPENSE STATUS: NORMAL
BH BB PRODUCT CODE: NORMAL
BH BB UNIT NUMBER: NORMAL
BILIRUB SERPL-MCNC: 0.7 MG/DL (ref 0–1.2)
BILIRUB UR QL STRIP: NEGATIVE
BLD GP AB SCN SERPL QL: NEGATIVE
BUN SERPL-MCNC: 64 MG/DL (ref 8–23)
BUN/CREAT SERPL: 21.3 (ref 7–25)
CALCIUM SPEC-SCNC: 8.6 MG/DL (ref 8.6–10.5)
CHLORIDE SERPL-SCNC: 101 MMOL/L (ref 98–107)
CLARITY UR: ABNORMAL
CO2 SERPL-SCNC: 21 MMOL/L (ref 22–29)
COLOR UR: YELLOW
CREAT SERPL-MCNC: 3 MG/DL (ref 0.76–1.27)
DEPRECATED RDW RBC AUTO: 51.7 FL (ref 37–54)
EGFRCR SERPLBLD CKD-EPI 2021: 19.9 ML/MIN/1.73
EOSINOPHIL # BLD AUTO: 0.14 10*3/MM3 (ref 0–0.4)
EOSINOPHIL NFR BLD AUTO: 1.5 % (ref 0.3–6.2)
ERYTHROCYTE [DISTWIDTH] IN BLOOD BY AUTOMATED COUNT: 15.2 % (ref 12.3–15.4)
GLOBULIN UR ELPH-MCNC: 3.1 GM/DL
GLUCOSE BLDC GLUCOMTR-MCNC: 111 MG/DL (ref 70–130)
GLUCOSE BLDC GLUCOMTR-MCNC: 93 MG/DL (ref 70–130)
GLUCOSE BLDC GLUCOMTR-MCNC: 94 MG/DL (ref 70–130)
GLUCOSE BLDC GLUCOMTR-MCNC: 96 MG/DL (ref 70–130)
GLUCOSE SERPL-MCNC: 124 MG/DL (ref 65–99)
GLUCOSE UR STRIP-MCNC: NEGATIVE MG/DL
HCT VFR BLD AUTO: 34.2 % (ref 37.5–51)
HGB BLD-MCNC: 11.7 G/DL (ref 13–17.7)
HGB UR QL STRIP.AUTO: ABNORMAL
HYALINE CASTS UR QL AUTO: ABNORMAL /LPF
IMM GRANULOCYTES # BLD AUTO: 0.03 10*3/MM3 (ref 0–0.05)
IMM GRANULOCYTES NFR BLD AUTO: 0.3 % (ref 0–0.5)
KETONES UR QL STRIP: NEGATIVE
LEUKOCYTE ESTERASE UR QL STRIP.AUTO: ABNORMAL
LYMPHOCYTES # BLD AUTO: 0.21 10*3/MM3 (ref 0.7–3.1)
LYMPHOCYTES NFR BLD AUTO: 2.3 % (ref 19.6–45.3)
MCH RBC QN AUTO: 31.6 PG (ref 26.6–33)
MCHC RBC AUTO-ENTMCNC: 34.2 G/DL (ref 31.5–35.7)
MCV RBC AUTO: 92.4 FL (ref 79–97)
MONOCYTES # BLD AUTO: 0.76 10*3/MM3 (ref 0.1–0.9)
MONOCYTES NFR BLD AUTO: 8.4 % (ref 5–12)
NEUTROPHILS NFR BLD AUTO: 7.9 10*3/MM3 (ref 1.7–7)
NEUTROPHILS NFR BLD AUTO: 87.3 % (ref 42.7–76)
NITRITE UR QL STRIP: NEGATIVE
NRBC BLD AUTO-RTO: 0 /100 WBC (ref 0–0.2)
PH UR STRIP.AUTO: <=5 [PH] (ref 5–8)
PLATELET # BLD AUTO: 94 10*3/MM3 (ref 140–450)
PMV BLD AUTO: 11.5 FL (ref 6–12)
POTASSIUM SERPL-SCNC: 3.7 MMOL/L (ref 3.5–5.2)
PROT ?TM UR-MCNC: 33.7 MG/DL
PROT SERPL-MCNC: 5.7 G/DL (ref 6–8.5)
PROT UR QL STRIP: ABNORMAL
QT INTERVAL: 364 MS
QTC INTERVAL: 422 MS
RBC # BLD AUTO: 3.7 10*6/MM3 (ref 4.14–5.8)
RBC # UR STRIP: ABNORMAL /HPF
REF LAB TEST METHOD: ABNORMAL
RH BLD: POSITIVE
SODIUM SERPL-SCNC: 134 MMOL/L (ref 136–145)
SODIUM UR-SCNC: 45 MMOL/L
SP GR UR STRIP: 1.02 (ref 1–1.03)
SQUAMOUS #/AREA URNS HPF: ABNORMAL /HPF
T&S EXPIRATION DATE: NORMAL
UNIT  ABO: NORMAL
UNIT  RH: NORMAL
UROBILINOGEN UR QL STRIP: ABNORMAL
UUN 24H UR-MCNC: 487 MG/DL
WBC # UR STRIP: ABNORMAL /HPF
WBC NRBC COR # BLD: 9.06 10*3/MM3 (ref 3.4–10.8)

## 2022-08-19 PROCEDURE — 82962 GLUCOSE BLOOD TEST: CPT

## 2022-08-19 PROCEDURE — 0FT44ZZ RESECTION OF GALLBLADDER, PERCUTANEOUS ENDOSCOPIC APPROACH: ICD-10-PCS | Performed by: SURGERY

## 2022-08-19 PROCEDURE — 81001 URINALYSIS AUTO W/SCOPE: CPT | Performed by: INTERNAL MEDICINE

## 2022-08-19 PROCEDURE — 80053 COMPREHEN METABOLIC PANEL: CPT | Performed by: INTERNAL MEDICINE

## 2022-08-19 PROCEDURE — 86900 BLOOD TYPING SEROLOGIC ABO: CPT | Performed by: SURGERY

## 2022-08-19 PROCEDURE — 25010000002 ERTAPENEM PER 500 MG: Performed by: SURGERY

## 2022-08-19 PROCEDURE — 93005 ELECTROCARDIOGRAM TRACING: CPT | Performed by: ANESTHESIOLOGY

## 2022-08-19 PROCEDURE — 25010000002 FENTANYL CITRATE (PF) 50 MCG/ML SOLUTION: Performed by: NURSE ANESTHETIST, CERTIFIED REGISTERED

## 2022-08-19 PROCEDURE — 84540 ASSAY OF URINE/UREA-N: CPT | Performed by: INTERNAL MEDICINE

## 2022-08-19 PROCEDURE — 87186 SC STD MICRODIL/AGAR DIL: CPT | Performed by: SURGERY

## 2022-08-19 PROCEDURE — 88304 TISSUE EXAM BY PATHOLOGIST: CPT | Performed by: SURGERY

## 2022-08-19 PROCEDURE — 25010000002 PROTHROMBIN COMPLEX CONC HUMAN 1000 UNITS KIT: Performed by: SURGERY

## 2022-08-19 PROCEDURE — 93010 ELECTROCARDIOGRAM REPORT: CPT | Performed by: INTERNAL MEDICINE

## 2022-08-19 PROCEDURE — 84156 ASSAY OF PROTEIN URINE: CPT | Performed by: INTERNAL MEDICINE

## 2022-08-19 PROCEDURE — 87070 CULTURE OTHR SPECIMN AEROBIC: CPT | Performed by: SURGERY

## 2022-08-19 PROCEDURE — 87205 SMEAR GRAM STAIN: CPT | Performed by: SURGERY

## 2022-08-19 PROCEDURE — 86901 BLOOD TYPING SEROLOGIC RH(D): CPT | Performed by: SURGERY

## 2022-08-19 PROCEDURE — 87077 CULTURE AEROBIC IDENTIFY: CPT | Performed by: SURGERY

## 2022-08-19 PROCEDURE — 25010000002 HEPARIN (PORCINE) PER 1000 UNITS: Performed by: SURGERY

## 2022-08-19 PROCEDURE — 99232 SBSQ HOSP IP/OBS MODERATE 35: CPT | Performed by: INTERNAL MEDICINE

## 2022-08-19 PROCEDURE — 86850 RBC ANTIBODY SCREEN: CPT | Performed by: SURGERY

## 2022-08-19 PROCEDURE — 85025 COMPLETE CBC W/AUTO DIFF WBC: CPT | Performed by: INTERNAL MEDICINE

## 2022-08-19 PROCEDURE — 25010000002 PROPOFOL 10 MG/ML EMULSION: Performed by: NURSE ANESTHETIST, CERTIFIED REGISTERED

## 2022-08-19 PROCEDURE — 84300 ASSAY OF URINE SODIUM: CPT | Performed by: INTERNAL MEDICINE

## 2022-08-19 DEVICE — LIGACLIP 10-M/L, 10MM ENDOSCOPIC ROTATING MULTIPLE CLIP APPLIERS
Type: IMPLANTABLE DEVICE | Site: ABDOMEN | Status: FUNCTIONAL
Brand: LIGACLIP

## 2022-08-19 RX ORDER — FAMOTIDINE 20 MG/1
20 TABLET, FILM COATED ORAL ONCE
Status: DISCONTINUED | OUTPATIENT
Start: 2022-08-19 | End: 2022-08-19 | Stop reason: HOSPADM

## 2022-08-19 RX ORDER — SODIUM CHLORIDE, SODIUM LACTATE, POTASSIUM CHLORIDE, CALCIUM CHLORIDE 600; 310; 30; 20 MG/100ML; MG/100ML; MG/100ML; MG/100ML
100 INJECTION, SOLUTION INTRAVENOUS CONTINUOUS
Status: DISCONTINUED | OUTPATIENT
Start: 2022-08-19 | End: 2022-08-19

## 2022-08-19 RX ORDER — ROSUVASTATIN CALCIUM 10 MG/1
10 TABLET, COATED ORAL DAILY
Status: DISCONTINUED | OUTPATIENT
Start: 2022-08-19 | End: 2022-08-22 | Stop reason: HOSPADM

## 2022-08-19 RX ORDER — ONDANSETRON 4 MG/1
4 TABLET, FILM COATED ORAL EVERY 8 HOURS PRN
Status: DISCONTINUED | OUTPATIENT
Start: 2022-08-19 | End: 2022-08-22 | Stop reason: HOSPADM

## 2022-08-19 RX ORDER — SODIUM CHLORIDE, SODIUM LACTATE, POTASSIUM CHLORIDE, CALCIUM CHLORIDE 600; 310; 30; 20 MG/100ML; MG/100ML; MG/100ML; MG/100ML
9 INJECTION, SOLUTION INTRAVENOUS CONTINUOUS
Status: DISCONTINUED | OUTPATIENT
Start: 2022-08-19 | End: 2022-08-19

## 2022-08-19 RX ORDER — SODIUM CHLORIDE 0.9 % (FLUSH) 0.9 %
10 SYRINGE (ML) INJECTION AS NEEDED
Status: DISCONTINUED | OUTPATIENT
Start: 2022-08-19 | End: 2022-08-19 | Stop reason: HOSPADM

## 2022-08-19 RX ORDER — BUPIVACAINE HYDROCHLORIDE 5 MG/ML
INJECTION, SOLUTION PERINEURAL AS NEEDED
Status: DISCONTINUED | OUTPATIENT
Start: 2022-08-19 | End: 2022-08-19 | Stop reason: HOSPADM

## 2022-08-19 RX ORDER — FENTANYL CITRATE 50 UG/ML
INJECTION, SOLUTION INTRAMUSCULAR; INTRAVENOUS AS NEEDED
Status: DISCONTINUED | OUTPATIENT
Start: 2022-08-19 | End: 2022-08-19 | Stop reason: SURG

## 2022-08-19 RX ORDER — LIDOCAINE HYDROCHLORIDE 10 MG/ML
0.5 INJECTION, SOLUTION EPIDURAL; INFILTRATION; INTRACAUDAL; PERINEURAL ONCE AS NEEDED
Status: DISCONTINUED | OUTPATIENT
Start: 2022-08-19 | End: 2022-08-19 | Stop reason: HOSPADM

## 2022-08-19 RX ORDER — LIDOCAINE HYDROCHLORIDE 20 MG/ML
INJECTION, SOLUTION INFILTRATION; PERINEURAL AS NEEDED
Status: DISCONTINUED | OUTPATIENT
Start: 2022-08-19 | End: 2022-08-19 | Stop reason: SURG

## 2022-08-19 RX ORDER — HYDROCODONE BITARTRATE AND ACETAMINOPHEN 5; 325 MG/1; MG/1
1 TABLET ORAL EVERY 6 HOURS PRN
Status: DISCONTINUED | OUTPATIENT
Start: 2022-08-19 | End: 2022-08-22 | Stop reason: HOSPADM

## 2022-08-19 RX ORDER — SODIUM CHLORIDE 9 MG/ML
9 INJECTION, SOLUTION INTRAVENOUS CONTINUOUS PRN
Status: DISCONTINUED | OUTPATIENT
Start: 2022-08-19 | End: 2022-08-19 | Stop reason: HOSPADM

## 2022-08-19 RX ORDER — SODIUM CHLORIDE 9 MG/ML
75 INJECTION, SOLUTION INTRAVENOUS CONTINUOUS
Status: DISCONTINUED | OUTPATIENT
Start: 2022-08-19 | End: 2022-08-20

## 2022-08-19 RX ORDER — SODIUM CHLORIDE 9 MG/ML
INJECTION, SOLUTION INTRAVENOUS AS NEEDED
Status: DISCONTINUED | OUTPATIENT
Start: 2022-08-19 | End: 2022-08-19 | Stop reason: HOSPADM

## 2022-08-19 RX ORDER — SODIUM CHLORIDE, SODIUM LACTATE, POTASSIUM CHLORIDE, CALCIUM CHLORIDE 600; 310; 30; 20 MG/100ML; MG/100ML; MG/100ML; MG/100ML
INJECTION, SOLUTION INTRAVENOUS CONTINUOUS PRN
Status: DISCONTINUED | OUTPATIENT
Start: 2022-08-19 | End: 2022-08-19 | Stop reason: SURG

## 2022-08-19 RX ORDER — MIDAZOLAM HYDROCHLORIDE 1 MG/ML
0.5 INJECTION INTRAMUSCULAR; INTRAVENOUS
Status: DISCONTINUED | OUTPATIENT
Start: 2022-08-19 | End: 2022-08-19 | Stop reason: HOSPADM

## 2022-08-19 RX ORDER — ROCURONIUM BROMIDE 10 MG/ML
INJECTION, SOLUTION INTRAVENOUS AS NEEDED
Status: DISCONTINUED | OUTPATIENT
Start: 2022-08-19 | End: 2022-08-19 | Stop reason: SURG

## 2022-08-19 RX ORDER — FAMOTIDINE 10 MG/ML
20 INJECTION, SOLUTION INTRAVENOUS ONCE
Status: DISCONTINUED | OUTPATIENT
Start: 2022-08-19 | End: 2022-08-19 | Stop reason: HOSPADM

## 2022-08-19 RX ORDER — PROPOFOL 10 MG/ML
VIAL (ML) INTRAVENOUS AS NEEDED
Status: DISCONTINUED | OUTPATIENT
Start: 2022-08-19 | End: 2022-08-19 | Stop reason: SURG

## 2022-08-19 RX ORDER — MAGNESIUM HYDROXIDE 1200 MG/15ML
LIQUID ORAL AS NEEDED
Status: DISCONTINUED | OUTPATIENT
Start: 2022-08-19 | End: 2022-08-19 | Stop reason: HOSPADM

## 2022-08-19 RX ORDER — FAMOTIDINE 10 MG/ML
20 INJECTION, SOLUTION INTRAVENOUS
Status: COMPLETED | OUTPATIENT
Start: 2022-08-19 | End: 2022-08-19

## 2022-08-19 RX ORDER — SODIUM CHLORIDE 0.9 % (FLUSH) 0.9 %
10 SYRINGE (ML) INJECTION EVERY 12 HOURS SCHEDULED
Status: DISCONTINUED | OUTPATIENT
Start: 2022-08-19 | End: 2022-08-19 | Stop reason: HOSPADM

## 2022-08-19 RX ORDER — FENTANYL CITRATE 50 UG/ML
50 INJECTION, SOLUTION INTRAMUSCULAR; INTRAVENOUS
Status: DISCONTINUED | OUTPATIENT
Start: 2022-08-19 | End: 2022-08-19 | Stop reason: HOSPADM

## 2022-08-19 RX ORDER — HEPARIN SODIUM 5000 [USP'U]/ML
5000 INJECTION, SOLUTION INTRAVENOUS; SUBCUTANEOUS EVERY 8 HOURS SCHEDULED
Status: DISCONTINUED | OUTPATIENT
Start: 2022-08-19 | End: 2022-08-21

## 2022-08-19 RX ORDER — BUPIVACAINE HCL/0.9 % NACL/PF 0.125 %
PLASTIC BAG, INJECTION (ML) EPIDURAL AS NEEDED
Status: DISCONTINUED | OUTPATIENT
Start: 2022-08-19 | End: 2022-08-19 | Stop reason: SURG

## 2022-08-19 RX ORDER — HYDROMORPHONE HYDROCHLORIDE 1 MG/ML
0.5 INJECTION, SOLUTION INTRAMUSCULAR; INTRAVENOUS; SUBCUTANEOUS
Status: DISCONTINUED | OUTPATIENT
Start: 2022-08-19 | End: 2022-08-19 | Stop reason: HOSPADM

## 2022-08-19 RX ADMIN — Medication 100 MCG: at 15:30

## 2022-08-19 RX ADMIN — ROCURONIUM BROMIDE 50 MG: 10 INJECTION INTRAVENOUS at 14:58

## 2022-08-19 RX ADMIN — Medication 200 MCG: at 15:48

## 2022-08-19 RX ADMIN — SODIUM CHLORIDE 9 ML/HR: 9 INJECTION, SOLUTION INTRAVENOUS at 13:59

## 2022-08-19 RX ADMIN — ROCURONIUM BROMIDE 20 MG: 10 INJECTION INTRAVENOUS at 15:29

## 2022-08-19 RX ADMIN — LEVOTHYROXINE SODIUM 175 MCG: 150 TABLET ORAL at 05:37

## 2022-08-19 RX ADMIN — Medication 200 MCG: at 16:46

## 2022-08-19 RX ADMIN — Medication 100 MCG: at 15:12

## 2022-08-19 RX ADMIN — SUGAMMADEX 200 MG: 100 INJECTION, SOLUTION INTRAVENOUS at 16:53

## 2022-08-19 RX ADMIN — Medication 100 MCG: at 15:15

## 2022-08-19 RX ADMIN — LIDOCAINE HYDROCHLORIDE 100 MG: 20 INJECTION, SOLUTION INFILTRATION; PERINEURAL at 14:58

## 2022-08-19 RX ADMIN — HEPARIN SODIUM 5000 UNITS: 5000 INJECTION INTRAVENOUS; SUBCUTANEOUS at 22:11

## 2022-08-19 RX ADMIN — Medication 200 MCG: at 16:21

## 2022-08-19 RX ADMIN — FENTANYL CITRATE 75 MCG: 50 INJECTION, SOLUTION INTRAMUSCULAR; INTRAVENOUS at 15:39

## 2022-08-19 RX ADMIN — SODIUM CHLORIDE, POTASSIUM CHLORIDE, SODIUM LACTATE AND CALCIUM CHLORIDE: 600; 310; 30; 20 INJECTION, SOLUTION INTRAVENOUS at 16:02

## 2022-08-19 RX ADMIN — FENTANYL CITRATE 25 MCG: 50 INJECTION, SOLUTION INTRAMUSCULAR; INTRAVENOUS at 14:54

## 2022-08-19 RX ADMIN — Medication 10 ML: at 20:46

## 2022-08-19 RX ADMIN — ERTAPENEM 500 MG: 1 INJECTION INTRAMUSCULAR; INTRAVENOUS at 20:45

## 2022-08-19 RX ADMIN — Medication 200 MCG: at 16:02

## 2022-08-19 RX ADMIN — SODIUM CHLORIDE, POTASSIUM CHLORIDE, SODIUM LACTATE AND CALCIUM CHLORIDE: 600; 310; 30; 20 INJECTION, SOLUTION INTRAVENOUS at 14:58

## 2022-08-19 RX ADMIN — Medication 100 MCG: at 16:12

## 2022-08-19 RX ADMIN — ROSUVASTATIN CALCIUM 10 MG: 10 TABLET, COATED ORAL at 09:05

## 2022-08-19 RX ADMIN — PROPOFOL 100 MG: 10 INJECTION, EMULSION INTRAVENOUS at 14:58

## 2022-08-19 RX ADMIN — ROCURONIUM BROMIDE 20 MG: 10 INJECTION INTRAVENOUS at 16:16

## 2022-08-19 RX ADMIN — SODIUM CHLORIDE 125 ML/HR: 9 INJECTION, SOLUTION INTRAVENOUS at 20:45

## 2022-08-19 RX ADMIN — Medication 100 MCG: at 16:17

## 2022-08-19 RX ADMIN — Medication 100 MCG: at 15:25

## 2022-08-19 RX ADMIN — Medication 200 MCG: at 16:27

## 2022-08-19 RX ADMIN — FAMOTIDINE 20 MG: 10 INJECTION INTRAVENOUS at 13:59

## 2022-08-19 RX ADMIN — Medication 5000 UNITS: at 10:50

## 2022-08-19 NOTE — ANESTHESIA PREPROCEDURE EVALUATION
Anesthesia Evaluation     Patient summary reviewed and Nursing notes reviewed   no history of anesthetic complications:  NPO Solid Status: > 8 hours  NPO Liquid Status: > 8 hours           Airway   Mallampati: II  TM distance: >3 FB  Neck ROM: full  Possible difficult intubation  Dental - normal exam     Pulmonary - normal exam   (+) a smoker Former, sleep apnea,   Cardiovascular     PT is on anticoagulation therapy  Patient on routine beta blocker and Beta blocker given within 24 hours of surgery  Rhythm: irregular  Rate: normal    (+) hypertension, CAD, dysrhythmias Paroxysmal Atrial Fib, hyperlipidemia,     ROS comment: 06/2021 - lvef 69, rvsp 45-55, rv mildly dilated, trace ai, mild mr, mild tr, mild pi.     Neuro/Psych  (-) seizures, TIA  GI/Hepatic/Renal/Endo    (+) obesity,   liver disease fatty liver disease, renal disease ARF and CRI, diabetes mellitus, thyroid problem     Musculoskeletal     Abdominal    Substance History - negative use     OB/GYN          Other      history of cancer (melanoma; remote thyroid ca s/p chemo and resection)    ROS/Med Hx Other: hgb 11.7 k 3.7 cr 3.0   PAF on Xarelto      Phys Exam Other: Old well healed thyroidectomy scar present                Anesthesia Plan    ASA 3     general     (Risks and benefits of general anesthesia discussed with patient (including MI, CVA, death, recall, aspiration, oropharyngeal/dental damage), questions answered, agreeable to proceed.   MAP 80+ given age, comorbidities)  intravenous induction     Anesthetic plan, risks, benefits, and alternatives have been provided, discussed and informed consent has been obtained with: patient.    Use of blood products discussed with patient  Consented to blood products.       CODE STATUS:    Level Of Support Discussed With: Patient  Code Status (Patient has no pulse and is not breathing): CPR (Attempt to Resuscitate)  Medical Interventions (Patient has pulse or is breathing): Full Support

## 2022-08-19 NOTE — CASE MANAGEMENT/SOCIAL WORK
Discharge Planning Assessment  [facility]     Patient Name: Lio Griffith  MRN: [id number]  Today's Date: 8/19/2022    Admit Date: 8/18/2022     Discharge Needs Assessment     Row Name 08/19/22 1732       Living Environment    People in Home spouse    Name(s) of People in Home wife Negin [phone].    Current Living Arrangements home    Primary Care Provided by self    Provides Primary Care For no one    Family Caregiver if Needed spouse    Family Caregiver Names wife Negin [phone].    Quality of Family Relationships helpful;involved    Able to Return to Prior Arrangements yes       Transition Planning    Transportation Anticipated family or friend will provide       Discharge Needs Assessment    Readmission Within the Last 30 Days no previous admission in last 30 days    Equipment Currently Used at Home cpap               Discharge Plan     Row Name 08/19/22 1737       Plan    Plan Plan is home with wife transporting.    Plan Comments SW'er attempted to meet with patient at bedside twice patient was off the floor. Sw'er spoke with patients spouse at bedside together IDP information. SW'er gathered IDP information from spouse. Lives in [facility] with wife Negin [phone]. Functions independently with ADL’s, DME includes CPAP and no HH. Patient has Medicare insurance and can afford medications.  Patient has Medicare insurance and can afford medications. Plan is home with wife transporting.    Final Discharge Disposition Code 01 - home or self-care              Continued Care and Services - Admitted Since 8/18/2022    Coordination has not been started for this encounter.          Demographic Summary     Row Name 08/19/22 1736       General Information    Admission Type inpatient    Arrived From emergency department    Referral Source admission list    Reason for Consult discharge planning               Functional Status     Row Name 08/19/22 1738       Functional Status    Usual Activity  Tolerance good    Current Activity Tolerance good       Functional Status, IADL    Medications independent    Meal Preparation independent    Housekeeping independent    Laundry independent    Shopping independent    IADL Comments Functions independently with ADL’s, DME includes CPAP and no HH.       Employment/    Employment/ Comments Patient has Medicare insurance and can afford medications.               Psychosocial    No documentation.                Abuse/Neglect    No documentation.                Legal    No documentation.                Substance Abuse    No documentation.                Patient Forms    No documentation.                   BHAVIN Jaime (Kay)

## 2022-08-19 NOTE — OP NOTE
General Surgery Operative Note    Lio Griffith  [id number]  1935    Date of Surgery:  8/19/2022 17:00 EDT    Pre-op Diagnosis: Acute calculus cholecystitis    Post-op Diagnosis: Acute calculus cholecystitis    Procedure: Laparoscopic cholecystectomy    Surgeon: Quoc Brownlee MD    Anesthesia: General    Fluids: 1300 mL of crystalloid    Estimated Blood Loss: Less than 25 mL    Urine Voided: Not recorded     Specimens: Gallbladder                  Drains: 15 Telugu Gary drain right upper quadrant    Findings: Acute calculus cholecystitis    Complications: None apparent    History: 86-year-old gentleman presents with approximately 1 weeks worth of worsening abdominal pain.  Imaging is consistent with an acute cholecystitis.       I rehashed the risks and benefits of cholecystectomy, including but not limited to: bleeding, infection, injury to adjacent viscera (small bowel, large bowel, duodenum, common bile duct, hepatic artery, the liver etc.), bile leak, retained stones, chronic pain, abdominal wall hernia formation, need for ERCP, an open operation and general, and medical issues from a cardiopulmonary venous thrombosis standpoint.    Procedure:      After informed consent, the patient was taken to the operating room.  They were placed in the supine position on the operating table, general anesthesia administered, and the abdomen was then prepped and draped in the standard sterile fashion.  A time out was performed.      The operation began with a upper midline Bharti trocar cutdown away from the umbilicus.  The skin was anesthetized and incised, the peritoneum entered sharply under direct visualization, bilateral 0 Vicryl fascial sutures were placed, and the blunt-tipped Bharti trocar was placed intra-abdominally.  The abdomen was insufflated and the patient was placed in the head up position and rolled slightly onto the left hand side.  I then placed a subxiphoid port and two 5 mm trocars in  the right upper quadrant under direct visualization.     The gallbladder was acutely inflamed and tense.  I decompressed the gallbladder itself with a laparoscopic needle, purulent bile return sent for culture and sensitivity.  The friable fundus of the gallbladder was grasped and retracted cephalad up over the liver edge.  The neck of the gallbladder was then retracted inferior laterally.  The peritoneal attachments to the cystic duct and cystic artery were stripped down and a meticulous dissection demonstrated the critical view.  I then placed 2 clips proximally and one distally on the cystic duct, 3 clips proximally on the cystic artery and one distally.  The laparoscopic scissors were then used to transect these structures and removed the gallbladder from the gallbladder bed.  I placed the gallbladder into an Endo Catch bag and withdrew this from the abdomen.  I reinspected my clips, they were without bile leak or bleeding and the gallbladder fossa was similar. I inspected my umbilical port site and this was without obvious complication.  A 15 Thai Gary drain was brought out through the lateral 15 mm port site and sewn into place with a Prolene suture.  All trocars were removed under direct visualization without bleeding.  The upper midline fascial defect and subxiphoid fascial defect was closed with 0 Vicryl.  All skin incisions were closed with 4-0 Monocryl, Mastisol, Steri-Strips, Telfa, and Tegaderms.  The drain was placed to bulb suction.     The lap and needle count was reported as correct at the end of the procedure ×2. The patient was then transferred to the recovery room in stable condition.     Quoc Brownlee MD     Date: 8/19/2022  Time: 17:00 EDT

## 2022-08-19 NOTE — PLAN OF CARE
Goal Outcome Evaluation:  Plan of Care Reviewed With: patient        Progress: improving  Outcome Evaluation: VSS. 2L nasal canula. Patient returned to floor. Telemetry replaced. Afib on the monitor. No c/o pain or nausea. Slightly drowsy. Resting in bed. Lap sites C/D/I and KATY drain in place. Continuing to monitor.

## 2022-08-19 NOTE — ANESTHESIA POSTPROCEDURE EVALUATION
Patient: Lio Griffith    Procedure Summary     Date: 08/19/22 Room / Location:  [facility] / [facility]    Anesthesia Start: 1450 Anesthesia Stop: 1706    Procedure: CHOLECYSTECTOMY LAPAROSCOPIC (N/A Abdomen) Diagnosis:     Surgeons: Quoc Brownlee MD Provider: Amena Cancino DO    Anesthesia Type: general ASA Status: 3          Anesthesia Type: general    Vitals  No vitals data found for the desired time range.          Post Anesthesia Care and Evaluation    Patient location during evaluation: PACU  Patient participation: complete - patient participated  Level of consciousness: awake and alert  Pain management: adequate    Airway patency: patent  Anesthetic complications: No anesthetic complications  PONV Status: none  Cardiovascular status: hemodynamically stable and acceptable  Respiratory status: nonlabored ventilation, acceptable and nasal cannula  Hydration status: acceptable

## 2022-08-19 NOTE — H&P
[facility] Medicine Services  HISTORY AND PHYSICAL    Patient Name: Lio Griffith  : 1935  MRN: [id number]  Primary Care Physician: Johnnie Wylie MD  Date of admission: 2022      Subjective   Subjective     Chief Complaint:  Abdominal pain    HPI:  Lio Griffith is a 86 y.o. male with PMHx significant for BRAMBILA cirrhosis, HTN, CKDIII (1.7-1.8), PAF on Xarelto, DMII, HLD who presents to [facility] with complaints of RUQ abdominal pain. Patient reports his pain started on Saturday. It was sharp and mostly in mid-upper abdomen w/radiation to right side. He denies any previous issues with gallbladder. He denies nausea/vomiting, just no appetite and very poor PO intake since Saturday. He notes decreased urine output as well. He denies fever/chills. States that he just feels tired. Denies dizziness/lightheadedness and actually says that his pain has improved since he got here. In the ED found to have acute cholecystitis and gallstones.     Review of Systems   Gen- No fevers, chills  CV- No chest pain, palpitations  Resp- No cough, dyspnea  GI- No N/V/D, +abd pain    All other systems reviewed and are negative.     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 Good control         Oncology Problem List:  Malignant melanoma of torso excluding breast (HCC) (2019;   Status: Active)  Malignant neoplasm of thyroid gland (HCC) (Status: Active)       Past Surgical History:   Procedure Laterality Date   • ADENOIDECTOMY     • APPENDECTOMY     • HIP SURGERY Right 2013    Right JOYA    • JOINT REPLACEMENT     • THYROIDECTOMY     • TONSILLECTOMY     • VITRECTOMY PARS PLANA W/ REPAIR OF MACULAR HOLE         Family History:  family history includes Cancer in his father; Diabetes in his brother;  Hypertension in his brother; Obesity in his brother. Otherwise pertinent FHx was reviewed and unremarkable.     Social History:  reports that he quit smoking about 56 years ago. His smoking use included cigarettes. He started smoking about 71 years ago. He smoked 0.50 packs per day. He has never used smokeless tobacco. He reports that he does not drink alcohol and does not use drugs.  Social History     Social History Narrative   • Not on file       Medications:  Available home medication information reviewed.  (Not in a hospital admission)      Allergies   Allergen Reactions   • Dicloxacillin Rash       Objective   Objective     Vital Signs:   Temp:  [98.5 °F (36.9 °C)] 98.5 °F (36.9 °C)  Heart Rate:  [85-96] 86  Resp:  [18] 18  BP: (117)/(56) 117/56       Physical Exam   Constitutional: Awake, alert, obese male  Eyes: PERRLA, sclerae anicteric, no conjunctival injection  HENT: NCAT, mucous membranes moist  Neck: Supple, no thyromegaly, no lymphadenopathy, trachea midline  Respiratory: Clear to auscultation bilaterally, nonlabored respirations   Cardiovascular: RRR, no murmurs, rubs, or gallops, palpable pedal pulses bilaterally  Gastrointestinal: Positive bowel sounds, soft, nontender, nondistended  Musculoskeletal: No bilateral ankle edema, no clubbing or cyanosis to extremities  Psychiatric: Appropriate affect, cooperative  Neurologic: Oriented x 3, strength symmetric in all extremities, Cranial Nerves grossly intact to confrontation, speech clear  Skin: large telangiectasia over the chest      Result Review:  I have personally reviewed the results from the time of this admission to 8/18/2022 20:04 EDT and agree with these findings:  [x]  Laboratory list / accordion  [x]  Microbiology  [x]  Radiology  [x]  EKG/Telemetry   [x]  Cardiology/Vascular   [x]  Pathology  [x]  Old records  []  Other:  Most notable findings include:      LAB RESULTS:      Lab 08/18/22  1752 08/15/22  1256   WBC 12.51* 10.76   HEMOGLOBIN  14.2 15.2   HEMATOCRIT 41.4 45.0   PLATELETS 126* 171   NEUTROS ABS 10.84* 9.30*   IMMATURE GRANS (ABS) 0.03 0.04   LYMPHS ABS 0.35* 0.43*   MONOS ABS 1.10* 0.86   EOS ABS 0.15 0.09   MCV 92.0 93.9   PROCALCITONIN 9.94*  --    LACTATE 1.4  --          Lab 08/18/22  1752 08/15/22  1256   SODIUM 131* 134*   POTASSIUM 4.8 4.0   CHLORIDE 94* 93*   CO2 26.0 24.0   ANION GAP 11.0 17.0*   BUN 65* 31*   CREATININE 3.77* 1.63*   EGFR 15.1* 41.3*   GLUCOSE 112* 136*   CALCIUM 9.1 9.8         Lab 08/18/22  1752 08/15/22  1256   TOTAL PROTEIN 5.9* 6.9   ALBUMIN 3.50 4.40   GLOBULIN 2.4 2.5   ALT (SGPT) 39 17   AST (SGOT) 84* 23   BILIRUBIN 0.9 1.0   ALK PHOS 107 101   AMYLASE  --  51   LIPASE 18 21         Lab 08/15/22  1256   TROPONIN T <0.010                 UA    Urinalysis 12/21/21 12/21/21 3/23/22 3/23/22 6/9/22 6/9/22    1436 1436 0856 0856 1013 1013   Squamous Epithelial Cells, UA  0-2  0-2  0-2   Specific Gravity, UA 1.024  1.019  1.016    Ketones, UA Negative  Negative  Negative    Blood, UA Negative  Negative  Negative    Leukocytes, UA Moderate (2+) (A)  Moderate (2+) (A)  Moderate (2+) (A)    Nitrite, UA Negative  Negative  Negative    RBC, UA  None Seen  0-2  0-2   WBC, UA  13-20 (A)  13-20 (A)  3-5 (A)   Bacteria, UA  None Seen  None Seen  None Seen   (A) Abnormal value              Microbiology Results (last 10 days)     Procedure Component Value - Date/Time    COVID PRE-OP / PRE-PROCEDURE SCREENING ORDER (NO ISOLATION) - Swab, Nasopharynx [[id number]]  (Normal) Collected: 08/18/22 1753    Lab Status: Final result Specimen: Swab from Nasopharynx Updated: 08/18/22 1836    Narrative:      The following orders were created for panel order COVID PRE-OP / PRE-PROCEDURE SCREENING ORDER (NO ISOLATION) - Swab, Nasopharynx.  Procedure                               Abnormality         Status                     ---------                               -----------         ------                     COVID-19 and FLU A/B  PCR...[[id number]]  Normal              Final result                 Please view results for these tests on the individual orders.    COVID-19 and FLU A/B PCR - Swab, Nasopharynx [[id number]]  (Normal) Collected: 08/18/22 1753    Lab Status: Final result Specimen: Swab from Nasopharynx Updated: 08/18/22 1836     COVID19 Not Detected     Influenza A PCR Not Detected     Influenza B PCR Not Detected    Narrative:      Fact sheet for providers: https://www.fda.gov/media/184296/download    Fact sheet for patients: https://www.fda.gov/media/690249/download    Test performed by PCR.          CT Abdomen With Contrast    Result Date: 8/18/2022  DATE OF EXAM: 8/18/2022 2:51 PM  PROCEDURE: CT ABDOMEN W CONTRAST-  INDICATIONS: R10.9; R10.9-Unspecified abdominal pain  COMPARISON: 3/15/2016  TECHNIQUE: Routine transaxial slices were obtained through the abdomen after the intravenous administration of 60 mL of Isovue 300. Reconstructed coronal and sagittal images were also obtained. Automated exposure control and iterative construction methods were used.  The radiation dose reduction device was turned on for each scan per the ALARA (As Low as Reasonably Achievable) protocol.  FINDINGS: The gallbladder is markedly inflamed, thick-walled and edematous, with average gallbladder wall thickness of almost 2 cm. A large calcified gallstone is again seen, as on prior study, measuring up to 3.5 cm in diameter. There is moderately extensive surrounding inflammatory fat stranding, and mild secondary inflammation of the adjacent second portion of the duodenum. There is no evidence of bowel obstruction. Oral contrast passes normally through the stomach, duodenum and into the jejunum.  Elsewhere at the level of this scan, the included lower lungs appear clear. There is diffuse fatty liver change. Multiple bilateral renal cysts are again noted as well as a couple of nonobstructing left lower pole renal calculi up to 3 or 4 mm in maximal diameter.  Spleen and adrenal glands appear within normal limits.  Pancreas is relatively atrophic, as is often seen in a patient this age, however, today's study now shows isolated marked dilatation of pancreatic duct confined to the tip of the pancreatic tail, up to approximately 8 mm in diameter as visible on images 38 through 46. There are associated calcifications and findings could reflect sequelae of previous pancreatitis. An underlying pancreatic mass cannot be entirely excluded and there is a focal dense area of parenchyma at the level of potential ductal obstruction where an underlying isodense nodule up to 14 mm in direct be present, although no well-defined mass is seen. Please refer to axial image 47 of series 2. No active inflammation is seen here.  No free air ascites, adenopathy or other acute inflammatory focus is seen. Delayed venous phase images show no evidence of obstructive uropathy. Bony structures appear to be intact. There is advanced multilevel lumbar degenerative disc disease.            Impression:  1. Findings consistent with acute cholecystitis with markedly thickened and edematous gallbladder wall, 3.5 cm gallstone, and surrounding inflammatory change. No evidence of intra or extrahepatic biliary ductal dilatation. 2. Atrophic appearance of the pancreatic tail, associated marked ductal dilatation of the pancreatic tail only, and associated pancreatic parenchymal calcifications. These findings could reflect sequelae of previous pancreatitis, possibly ductal obstruction due to small occult neoplasm of the junction of the body and tail the pancreas, although no well-defined pancreatic lesion is seen. Stricture from prior pancreatitis might cause this appearance. Similar changes are present on the patient's multiple prior unenhanced exams, but are more visible today as that this is the only contrast enhanced scan of the abdomen. Chronic disease is favored recent ductal obstruction. 3. Multiple renal  cysts and small nonobstructing left lower pole renal calculi. No evidence of obstructive uropathy.    Note: Phone findings were called to Dr. Inessa bishop at approximately 3:15 PM 8/18/2022. They will contact the patient with further instructions, while he remains at the outpatient imaging Center.  This report was finalized on 8/18/2022 3:22 PM by Dr. Parish Granados MD.        Results for orders placed during the hospital encounter of 06/16/21    Adult Transthoracic Echo Complete W/ Cont if Necessary Per Protocol    Interpretation Summary  · Estimated left ventricular EF = 69% Left ventricular systolic function is normal.  · Left ventricular wall thickness is consistent with mild concentric hypertrophy.  · Estimated right ventricular systolic pressure from tricuspid regurgitation is moderately elevated (45-55 mmHg).  · The right atrial cavity is mild to moderately dilated.  · Left atrial volume is mildly increased.  · The right ventricular cavity is mildly dilated.      Assessment & Plan   Assessment & Plan     Active Hospital Problems    Diagnosis  POA   • Acute renal failure (ARF) (HCC) [N17.9]  Yes   • Sepsis, unspecified organism (HCC) [A41.9]  Yes   • Acute cholecystitis [K81.0]  Yes   • Sepsis without acute organ dysfunction, due to unspecified organism (HCC) [A41.9]  Yes   • BRAMBILA (nonalcoholic steatohepatitis) [K75.81]  Yes     -F4 cirrhosis per BRAMBILA Fibrosure serology 5/19     • Diabetic nephropathy associated with type 2 diabetes mellitus (HCC) [E11.21]  Yes   • Coronary artery disease involving native coronary artery of native heart without angina pectoris [I25.10]  Yes     Cardiac cath 2002: 50% LAD branch vessel, minimal residual coronary disease, medical management     • Stage 3 chronic kidney disease (HCC) [N18.30]  Yes     Nephrology associates     • Hypercholesterolemia [E78.00]  Yes   • Essential hypertension [I10]  Yes     Controlled.      • Chronic atrial fibrillation (HCC) [I48.20]  Yes      Diagnosed 1/13  Rate-controlled, anticoagulated. Presently asymptomatic. Continue present management.      • Type 2 diabetes mellitus with stage 3 chronic kidney disease, without long-term current use of insulin (HCC) [E11.22, N18.30]  Yes     A1C 5% per patient in March. Good control         Lio Griffith is a 86 y.o. male with PMHx significant for BRAMBILA cirrhosis, HTN, CKDIII (1.7-1.8), PAF on Xarelto, DMII, CAD, HLD who presents to [facility] with complaints of RUQ abdominal pain.    Acute Cholecystitis  - CT scan consistent with acute cholecystitis with markedly thickened and edematous gallbladder wall, 3.5 cm gallstone, and surrounding inflammatory change  - continue IV abx, will switch to Invanz given reported PCN allergy  - blood cultures x 2 pending, note procal significantly elevated  - general surgery consult, planning OR possibly tomorrow, although is on Xarelto, last dose taken this morning. Will hold.  - PRN anti-emetics, PRN pain control, IVF    Acute Renal Failure on CKDIII  Dehydration  - baseline appears to be around 1.7-1.8, currently 3.7, patient reports poor UOP  - strict I&Os, check urine studies, UA  - no evidence of obstruction on CT abd/pelvis  - suspect secondary to poor PO intake since Saturday  - IVF, repeat AM BMP  - Nephrology consult, patient follows with [facility]    PAF:  - hold Xarelto in anticipation of OR and in setting of worsening GFR. Last dose this morning.    BRAMBILA Cirrhosis:  TCP  - followed by Dr. Suarez  - stable    DMII:  - SSI for now    HTN:  - hold anti-hypertensives as he is borderline hypotensive in the ED    HLD:  - continue statin    CAD:  - medically managed    DVT prophylaxis:  SCDs, Xarelto on hold    CODE STATUS:    Code Status and Medical Interventions:   Ordered at: 08/18/22 1954     Level Of Support Discussed With:    Patient     Code Status (Patient has no pulse and is not breathing):    CPR (Attempt to Resuscitate)     Medical Interventions (Patient has pulse or is  breathing):    Full Support         Yoselin Valiente,   08/18/22

## 2022-08-19 NOTE — ANESTHESIA PROCEDURE NOTES
Airway  Urgency: elective    Date/Time: 8/19/2022 3:04 PM  Airway not difficult    General Information and Staff    Patient location during procedure: OR  CRNA/CAA: Zee Smith CRNA    Indications and Patient Condition  Indications for airway management: airway protection    Preoxygenated: yes  MILS not maintained throughout  Mask difficulty assessment: 1 - vent by mask    Final Airway Details  Final airway type: endotracheal airway      Successful airway: ETT  Cuffed: yes   Successful intubation technique: video laryngoscopy  Endotracheal tube insertion site: oral  Blade: Suarez  Blade size: 4  ETT size (mm): 7.5  Cormack-Lehane Classification: grade I - full view of glottis  Placement verified by: chest auscultation and capnometry   Cuff volume (mL): 5  Measured from: lips  ETT/EBT  to lips (cm): 20  Number of attempts at approach: 1  Assessment: lips, teeth, and gum same as pre-op and atraumatic intubation    Additional Comments  Negative epigastric sounds, Breath sound equal bilaterally with symmetric chest rise and fall

## 2022-08-19 NOTE — PROGRESS NOTES
[facility] Medicine Services  PROGRESS NOTE    Patient Name: Lio Griffith  : 1935  MRN: [id number]    Date of Admission: 2022  Primary Care Physician: Johnnie Wylie MD    Subjective   Subjective     CC: f/u cholecystitis    HPI: Lying in bed. Says he feels much better after IVF, IV abx.     ROS:  Gen- No fevers, chills  CV- No chest pain, palpitations  Resp- No cough, dyspnea  GI- No N/V/D, abd pain     Objective   Objective     Vital Signs:   Temp:  [98.1 °F (36.7 °C)-98.5 °F (36.9 °C)] 98.3 °F (36.8 °C)  Heart Rate:  [] 86  Resp:  [18] 18  BP: (104-123)/(56-77) 104/62  Flow (L/min):  [2] 2     Physical Exam:  Constitutional: No acute distress, awake, alert  HENT: NCAT, mucous membranes moist  Respiratory: Clear to auscultation bilaterally, respiratory effort normal   Cardiovascular: RRR, no murmurs, rubs, or gallops  Gastrointestinal: Positive bowel sounds, soft, obese, RUQ TTP  Musculoskeletal: No bilateral ankle edema  Psychiatric: Appropriate affect, cooperative  Neurologic: Oriented x 3, strength symmetric in all extremities, Cranial Nerves grossly intact to confrontation, speech clear  Skin: No rashes    Results Reviewed:  LAB RESULTS:      Lab 22  0316 22  1752 08/15/22  1256   WBC 9.06 12.51* 10.76   HEMOGLOBIN 11.7* 14.2 15.2   HEMATOCRIT 34.2* 41.4 45.0   PLATELETS 94* 126* 171   NEUTROS ABS 7.90* 10.84* 9.30*   IMMATURE GRANS (ABS) 0.03 0.03 0.04   LYMPHS ABS 0.21* 0.35* 0.43*   MONOS ABS 0.76 1.10* 0.86   EOS ABS 0.14 0.15 0.09   MCV 92.4 92.0 93.9   PROCALCITONIN  --  9.94*  --    LACTATE  --  1.4  --          Lab 22  0637 22  1752 08/15/22  1256   SODIUM 134* 131* 134*   POTASSIUM 3.7 4.8 4.0   CHLORIDE 101 94* 93*   CO2 21.0* 26.0 24.0   ANION GAP 12.0 11.0 17.0*   BUN 64* 65* 31*   CREATININE 3.00* 3.77* 1.63*   EGFR 19.9* 15.1* 41.3*   GLUCOSE 124* 112* 136*   CALCIUM 8.6 9.1 9.8         Lab 22  0637  08/18/22  1752 08/15/22  1256   TOTAL PROTEIN 5.7* 5.9* 6.9   ALBUMIN 2.60* 3.50 4.40   GLOBULIN 3.1 2.4 2.5   ALT (SGPT) 40 39 17   AST (SGOT) 74* 84* 23   BILIRUBIN 0.7 0.9 1.0   ALK PHOS 139* 107 101   AMYLASE  --   --  51   LIPASE  --  18 21         Lab 08/15/22  1256   TROPONIN T <0.010                 Brief Urine Lab Results  (Last result in the past 365 days)      Color   Clarity   Blood   Leuk Est   Nitrite   Protein   CREAT   Urine HCG        08/19/22 0212 Yellow   Cloudy   Trace   Moderate (2+)   Negative   30 mg/dL (1+)                 Microbiology Results Abnormal     Procedure Component Value - Date/Time    COVID PRE-OP / PRE-PROCEDURE SCREENING ORDER (NO ISOLATION) - Swab, Nasopharynx [[id number]]  (Normal) Collected: 08/18/22 1753    Lab Status: Final result Specimen: Swab from Nasopharynx Updated: 08/18/22 1836    Narrative:      The following orders were created for panel order COVID PRE-OP / PRE-PROCEDURE SCREENING ORDER (NO ISOLATION) - Swab, Nasopharynx.  Procedure                               Abnormality         Status                     ---------                               -----------         ------                     COVID-19 and FLU A/B PCR...[[id number]]  Normal              Final result                 Please view results for these tests on the individual orders.    COVID-19 and FLU A/B PCR - Swab, Nasopharynx [[id number]]  (Normal) Collected: 08/18/22 1753    Lab Status: Final result Specimen: Swab from Nasopharynx Updated: 08/18/22 1836     COVID19 Not Detected     Influenza A PCR Not Detected     Influenza B PCR Not Detected    Narrative:      Fact sheet for providers: https://www.fda.gov/media/252535/download    Fact sheet for patients: https://www.fda.gov/media/478120/download    Test performed by PCR.          CT Abdomen With Contrast    Result Date: 8/18/2022  DATE OF EXAM: 8/18/2022 2:51 PM  PROCEDURE: CT ABDOMEN W CONTRAST-  INDICATIONS: R10.9; R10.9-Unspecified abdominal pain   COMPARISON: 3/15/2016  TECHNIQUE: Routine transaxial slices were obtained through the abdomen after the intravenous administration of 60 mL of Isovue 300. Reconstructed coronal and sagittal images were also obtained. Automated exposure control and iterative construction methods were used.  The radiation dose reduction device was turned on for each scan per the ALARA (As Low as Reasonably Achievable) protocol.  FINDINGS: The gallbladder is markedly inflamed, thick-walled and edematous, with average gallbladder wall thickness of almost 2 cm. A large calcified gallstone is again seen, as on prior study, measuring up to 3.5 cm in diameter. There is moderately extensive surrounding inflammatory fat stranding, and mild secondary inflammation of the adjacent second portion of the duodenum. There is no evidence of bowel obstruction. Oral contrast passes normally through the stomach, duodenum and into the jejunum.  Elsewhere at the level of this scan, the included lower lungs appear clear. There is diffuse fatty liver change. Multiple bilateral renal cysts are again noted as well as a couple of nonobstructing left lower pole renal calculi up to 3 or 4 mm in maximal diameter. Spleen and adrenal glands appear within normal limits.  Pancreas is relatively atrophic, as is often seen in a patient this age, however, today's study now shows isolated marked dilatation of pancreatic duct confined to the tip of the pancreatic tail, up to approximately 8 mm in diameter as visible on images 38 through 46. There are associated calcifications and findings could reflect sequelae of previous pancreatitis. An underlying pancreatic mass cannot be entirely excluded and there is a focal dense area of parenchyma at the level of potential ductal obstruction where an underlying isodense nodule up to 14 mm in direct be present, although no well-defined mass is seen. Please refer to axial image 47 of series 2. No active inflammation is seen here.   No free air ascites, adenopathy or other acute inflammatory focus is seen. Delayed venous phase images show no evidence of obstructive uropathy. Bony structures appear to be intact. There is advanced multilevel lumbar degenerative disc disease.            Impression:  1. Findings consistent with acute cholecystitis with markedly thickened and edematous gallbladder wall, 3.5 cm gallstone, and surrounding inflammatory change. No evidence of intra or extrahepatic biliary ductal dilatation. 2. Atrophic appearance of the pancreatic tail, associated marked ductal dilatation of the pancreatic tail only, and associated pancreatic parenchymal calcifications. These findings could reflect sequelae of previous pancreatitis, possibly ductal obstruction due to small occult neoplasm of the junction of the body and tail the pancreas, although no well-defined pancreatic lesion is seen. Stricture from prior pancreatitis might cause this appearance. Similar changes are present on the patient's multiple prior unenhanced exams, but are more visible today as that this is the only contrast enhanced scan of the abdomen. Chronic disease is favored recent ductal obstruction. 3. Multiple renal cysts and small nonobstructing left lower pole renal calculi. No evidence of obstructive uropathy.    Note: Phone findings were called to Dr. Inessa bishop at approximately 3:15 PM 8/18/2022. They will contact the patient with further instructions, while he remains at the outpatient imaging Center.  This report was finalized on 8/18/2022 3:22 PM by Dr. Parish Granados MD.        Results for orders placed during the hospital encounter of 06/16/21    Adult Transthoracic Echo Complete W/ Cont if Necessary Per Protocol    Interpretation Summary  · Estimated left ventricular EF = 69% Left ventricular systolic function is normal.  · Left ventricular wall thickness is consistent with mild concentric hypertrophy.  · Estimated right ventricular systolic pressure from  tricuspid regurgitation is moderately elevated (45-55 mmHg).  · The right atrial cavity is mild to moderately dilated.  · Left atrial volume is mildly increased.  · The right ventricular cavity is mildly dilated.      I have reviewed the medications:  Scheduled Meds:ertapenem, 500 mg, Intravenous, Nightly  insulin lispro, 0-7 Units, Subcutaneous, TID AC  levothyroxine, 175 mcg, Oral, Q AM  prothrombin complex conc human, 50 Units/kg, Intravenous, Once  rosuvastatin, 10 mg, Oral, Daily  sodium chloride, 10 mL, Intravenous, Q12H      Continuous Infusions:sodium chloride, 100 mL/hr, Last Rate: 100 mL/hr (08/18/22 2018)  sodium chloride, 100 mL/hr, Last Rate: 100 mL/hr (08/18/22 2314)      PRN Meds:.•  acetaminophen  •  dextrose  •  dextrose  •  glucagon (human recombinant)  •  HYDROmorphone **AND** naloxone  •  ondansetron  •  Sodium Chloride (PF)  •  [COMPLETED] Insert peripheral IV **AND** sodium chloride  •  sodium chloride    Assessment & Plan   Assessment & Plan     Active Hospital Problems    Diagnosis  POA   • Acute renal failure (ARF) (HCC) [N17.9]  Yes   • Sepsis, unspecified organism (HCC) [A41.9]  Yes   • Acute cholecystitis [K81.0]  Yes   • Sepsis without acute organ dysfunction, due to unspecified organism (HCC) [A41.9]  Yes   • BRAMBILA (nonalcoholic steatohepatitis) [K75.81]  Yes   • Diabetic nephropathy associated with type 2 diabetes mellitus (HCC) [E11.21]  Yes   • Coronary artery disease involving native coronary artery of native heart without angina pectoris [I25.10]  Yes   • Stage 3 chronic kidney disease (HCC) [N18.30]  Yes   • Hypercholesterolemia [E78.00]  Yes   • Essential hypertension [I10]  Yes   • Chronic atrial fibrillation (HCC) [I48.20]  Yes   • Type 2 diabetes mellitus with stage 3 chronic kidney disease, without long-term current use of insulin (HCC) [E11.22, N18.30]  Yes      Resolved Hospital Problems   No resolved problems to display.        Brief Hospital Course to date:  Lio SINGER  Gladis is a 86 y.o. male with PMHx significant for BRAMBILA cirrhosis, HTN, CKDIII (1.7-1.8), PAF on Xarelto, DMII, CAD, HLD who presents to [facility] with complaints of RUQ abdominal pain.     Acute Cholecystitis  - CT scan consistent with acute cholecystitis with markedly thickened and edematous gallbladder wall, 3.5 cm gallstone, and surrounding inflammatory change  - Continue IV abx, IVF, NPO.  - Discussed briefly with Dr. Brownlee. Reversing Xarelto and OR today.   - PRN anti-emetics, PRN pain control, IVF     Acute Renal Failure on CKD IIIb  - His baseline appears to be around 1.7-1.8. Suspect worsening due to volume depletion assoc w/ above. Some better today after IVF.  - Nephrology consult, patient follows with [facility].  - BMP in am.     PAF:  - Continue hold Xarelto. KCentra today per Dr. Brownlee.   - Rat controlled.     BRAMBILA Cirrhosis:  TCP  - followed by Dr. Suarez  - stable     DMII:  - SSI for now     HTN:  - hold anti-hypertensives as he is borderline hypotensive in the ED     HLD:  - continue statin     CAD:  - medically managed     DVT prophylaxis:  SCDs, Xarelto on hold    Expected Discharge Location and Transportation: home  Expected Discharge Date: 8/21.    DVT prophylaxis:  Mechanical DVT prophylaxis orders are present.     AM-PAC 6 Clicks Score (PT): 22 (08/19/22 0800)    CODE STATUS:   Code Status and Medical Interventions:   Ordered at: 08/18/22 1954     Level Of Support Discussed With:    Patient     Code Status (Patient has no pulse and is not breathing):    CPR (Attempt to Resuscitate)     Medical Interventions (Patient has pulse or is breathing):    Full Support       Rosanna Roberson II, DO  08/19/22

## 2022-08-19 NOTE — CONSULTS
General Surgery Consultation Note    Date of Service: 8/19/2022  Lio SINGER Gladis  [id number]  1935      Referring Provider: Rosanna Roberson II, DO    Location of Consult: Hospital floor     Reason for Consultation: Acute cholecystitis       History of Present Illness:  I am seeing, Lio Griffith, in consultation for Rosanna Roberson II, DO regarding acute cholecystitis.  86-year-old gentleman presents with abdominal pain beginning this past Saturday.  His pain has worsened over the week with his worst day being Tuesday.  He has had lower abdominal pain, nausea though no vomiting.  He has had no fever, chills, scleral icterus, conjunctival pallor, tea colored urine, or light stools.  He has had no symptoms similar to this in the past.  Otherwise there are no significant modifying factors.     Problems Addressed this Visit        Gastrointestinal Abdominal     Acute cholecystitis       Other    Sepsis without acute organ dysfunction, due to unspecified organism (HCC) - Primary      Other Visit Diagnoses     Acute renal failure, unspecified acute renal failure type (HCC)          Diagnoses       Codes Comments    Sepsis without acute organ dysfunction, due to unspecified organism (HCC)    -  Primary ICD-10-CM: A41.9  ICD-9-CM: 038.9, 995.91     Acute cholecystitis     ICD-10-CM: K81.0  ICD-9-CM: 575.0     Acute renal failure, unspecified acute renal failure type (HCC)     ICD-10-CM: N17.9  ICD-9-CM: 584.9           Past Medical History:   Diagnosis Date   • Cancer (HCC)    • Hypertension    • Hypothyroidism    • Kidney stone    • Type 2 diabetes mellitus with renal complication (HCC) 6/11/1988    A1C 5% per patient in March. Good control       Past Surgical History:   • ADENOIDECTOMY   • APPENDECTOMY   • HIP SURGERY    Right JOYA    • JOINT REPLACEMENT   • THYROIDECTOMY   • TONSILLECTOMY   • VITRECTOMY PARS PLANA W/ REPAIR OF MACULAR HOLE       Allergies   Allergen Reactions   • Dicloxacillin Rash        Current Facility-Administered Medications on File Prior to Encounter   Medication Dose Route Frequency Provider Last Rate Last Admin   • [COMPLETED] barium (READI-CAT 2) suspension 450 mL  450 mL [facility]   450 mL at 08/18/22 1400   • [COMPLETED] iopamidol (ISOVUE-300) 61 % injection 100 mL  100 mL Intravenous [facility]   60 mL at 08/18/22 1505     Current Outpatient Medications on File Prior to Encounter   Medication Sig Dispense Refill   • allopurinol (ZYLOPRIM) 300 MG tablet TAKE ONE TABLET BY MOUTH DAILY 90 tablet 0   • amLODIPine (NORVASC) 5 MG tablet Take 5 mg by mouth Daily.     • cholecalciferol (VITAMIN D3) 1000 units tablet Take 2,000 Units by mouth Daily.     • fluticasone (FLONASE) 50 MCG/ACT nasal spray 2 sprays into the nostril(s) as directed by provider Daily. 1 bottle 5   • glucose blood test strip Monitor glucose once daily before breakfast. 100 each 3   • glucose monitor monitoring kit 1 each Every Morning Before Breakfast. 1 each 0   • levothyroxine (SYNTHROID, LEVOTHROID) 175 MCG tablet Take 1 tablet by mouth Daily. 30 tablet 5   • magnesium oxide (MAG-OX) 400 MG tablet Take 400 mg by mouth Daily.     • montelukast (SINGULAIR) 10 MG tablet Take 1 tablet by mouth Every Night. 90 tablet 3   • Multiple Vitamins-Minerals (MULTIVITAMIN ADULT PO) Take  by mouth.     • ONETOUCH DELICA LANCETS 33G misc Use as directed once daily. 100 each 3   • rosuvastatin (CRESTOR) 20 MG tablet Take 20 mg by mouth Daily.     • tolterodine (DETROL) 2 MG tablet Take 2 mg by mouth 2 (Two) Times a Day.     • XARELTO 15 MG tablet TAKE ONE TABLET BY MOUTH DAILY WITH DINNER 90 tablet 0   • atenolol (TENORMIN) 100 MG tablet Take 100 mg by mouth Daily.     • cephalexin (KEFLEX) 500 MG capsule Take 1 capsule by mouth 3 (Three) Times a Day. 21 capsule 0   • lovastatin (MEVACOR) 20 MG tablet TAKE ONE TABLET BY MOUTH ONCE NIGHTLY 90 tablet 0   • quinapril  (ACCUPRIL) 40 MG tablet Take 1 tablet by mouth Daily. 90 tablet 1         Current Facility-Administered Medications:   •  acetaminophen (TYLENOL) tablet 650 mg, 650 mg, Oral, Q4H PRN, Yoselin Valiente R, DO  •  dextrose (D50W) (25 g/50 mL) IV injection 25 g, 25 g, Intravenous, Q15 Min PRN, Steffanie, Yoselin R, DO  •  dextrose (GLUTOSE) oral gel 15 g, 15 g, Oral, Q15 Min PRN, Yoselin Valiente R, DO  •  ertapenem (INVanz) 500 mg/50 mL 0.9% NS, 500 mg, Intravenous, Nightly, SteffanieYoselin moore R, DO, 500 mg at 08/18/22 2313  •  glucagon (human recombinant) (GLUCAGEN DIAGNOSTIC) injection 1 mg, 1 mg, Intramuscular, Q15 Min PRN, Yoselin Valiente R, DO  •  HYDROmorphone (DILAUDID) injection 0.5 mg, 0.5 mg, Intravenous, Q2H PRN **AND** naloxone (NARCAN) injection 0.4 mg, 0.4 mg, Intravenous, Q5 Min PRN, Yoselin Valiente R, DO  •  Insulin Lispro (humaLOG) injection 0-7 Units, 0-7 Units, Subcutaneous, TID AC, Yoselin Valiente R, DO  •  levothyroxine (SYNTHROID, LEVOTHROID) tablet 175 mcg, 175 mcg, Oral, Q AM, Yoselin Valiente R, DO, 175 mcg at 08/19/22 0537  •  ondansetron (ZOFRAN) injection 4 mg, 4 mg, Intravenous, Q6H PRN, Yoselin Valiente R, DO  •  prothrombin complex conc human (KCentra) IV solution 5,000 Units, 50 Units/kg, Intravenous, Once, Quoc Brownlee MD  •  rosuvastatin (CRESTOR) tablet 10 mg, 10 mg, Oral, Daily, Rosanna Roberson II, DO, 10 mg at 08/19/22 0905  •  Sodium Chloride (PF) 0.9 % 10 mL, 10 mL, Intravenous, PRN, Yoselin Valiente R, DO  •  [COMPLETED] Insert peripheral IV, , , Once **AND** sodium chloride 0.9 % flush 10 mL, 10 mL, Intravenous, PRN, Yoselin Valiente, DO  •  sodium chloride 0.9 % flush 10 mL, 10 mL, Intravenous, Q12H, Yoselin Valiente, , 10 mL at 08/18/22 2312  •  sodium chloride 0.9 % flush 10 mL, 10 mL, Intravenous, PRN, Yoselin Valiente, DO  •  sodium chloride 0.9 % infusion, 100 mL/hr, Intravenous, Continuous, Yoselin Valiente, DO, Last Rate: 100 mL/hr at 08/18/22 2018, 100 mL/hr at  "22 2018  •  sodium chloride 0.9 % infusion, 100 mL/hr, Intravenous, Continuous, Yoselin Valiente, DO, Last Rate: 100 mL/hr at 22 2314, 100 mL/hr at 22 2314    Family History   Problem Relation Age of Onset   • Cancer Father    • Diabetes Brother    • Hypertension Brother    • Obesity Brother      Social History     Socioeconomic History   • Marital status:    Tobacco Use   • Smoking status: Former Smoker     Packs/day: 0.50     Types: Cigarettes     Start date:      Quit date:      Years since quittin.6   • Smokeless tobacco: Never Used   Substance and Sexual Activity   • Alcohol use: No   • Drug use: No   • Sexual activity: Defer       Review of Systems:  Review of Systems   Constitutional: Positive for appetite change and fatigue.   HENT: Negative for dental problem, ear pain and hearing loss.    Eyes: Negative for photophobia and visual disturbance.   Respiratory: Negative for cough, chest tightness and stridor.    Cardiovascular: Negative for chest pain and leg swelling.   Gastrointestinal: Positive for abdominal pain and nausea. Negative for diarrhea and vomiting.   Endocrine: Negative for polyphagia and polyuria.   Genitourinary: Negative for difficulty urinating, flank pain and hematuria.   Musculoskeletal: Negative for back pain and joint swelling.   Skin: Negative for pallor and rash.   Allergic/Immunologic: Negative for immunocompromised state.   Neurological: Negative for dizziness, speech difficulty and light-headedness.   Hematological: Negative for adenopathy.   Psychiatric/Behavioral: Negative for agitation, dysphoric mood and self-injury.     Otherwise the 12 point review of systems is negative.    /62 (BP Location: Left arm, Patient Position: Lying)   Pulse 86   Temp 98.3 °F (36.8 °C) (Oral)   Resp 18   Ht 175.3 cm (69\")   Wt 100 kg (221 lb)   SpO2 92%   BMI 32.64 kg/m²   Body mass index is 32.64 kg/m².    General: Pleasantly conversant, mild " distress  HEENT: PER, no icterus, normal sclerae  Cardiac: regular rhythm,  no audible rubs  Pulmonary: bilateral breath sounds, non labored  Abdominal: Soft, right upper quadrant tenderness, no generalized peritonitis, reducible umbilical hernia  Neurologic: awake, alert, no obvious focal deficits  Extremities: warm, no edema  Skin: no obvious rashes nor worrisome lesions seen     CBC  Results from last 7 days   Lab Units 08/19/22  0316   WBC 10*3/mm3 9.06   HEMOGLOBIN g/dL 11.7*   HEMATOCRIT % 34.2*   PLATELETS 10*3/mm3 94*       CMP  Results from last 7 days   Lab Units 08/19/22  0637   SODIUM mmol/L 134*   POTASSIUM mmol/L 3.7   CHLORIDE mmol/L 101   CO2 mmol/L 21.0*   BUN mg/dL 64*   CREATININE mg/dL 3.00*   CALCIUM mg/dL 8.6   BILIRUBIN mg/dL 0.7   ALK PHOS U/L 139*   ALT (SGPT) U/L 40   AST (SGOT) U/L 74*   GLUCOSE mg/dL 124*       Radiology  Imaging Results (Last 72 Hours)     ** No results found for the last 72 hours. **            Assessment:  Acute cholecystitis  Umbilical hernia  Acute kidney failure on chronic renal disease  Atrial fibrillation on anticoagulation  Diabetes mellitus  Nonalcoholic steatohepatitis    Plan:  N.p.o., IV volume resuscitation, IV antibiotics, and cholecystectomy.  His symptoms have been ongoing for approximately 1 week.  He has impressive edema of his gallbladder itself.  I think he will do better ultimately with an operation as opposed to a cholecystostomy tube.  I am going to reverse his anticoagulation with Kcentra and order platelets to have on-call to the operating room. I discussed the risks and benefits associated with cholecystectomy including, but not limited to: bleeding, infection, injury to adjacent viscera (duodenum, common bile duct, etc.), retained stones, biloma, need for ERCP, chronic pain, an open operation in general, bile leak, chronic pain, incisional hernia formation, and medical issues from a cardiopulmonary deep venous thrombosis standpoint. All  questions were answered, he understands, and wishes to proceed with cholecystectomy.    Quoc Brownlee MD  08/19/22  09:27 EDT

## 2022-08-19 NOTE — CONSULTS
Patient Care Team:  Johnnie Wylie MD as PCP - General (Internal Medicine)  Dominick Hannah MD as Consulting Physician (Nephrology)  Ambar Diaz MD as Consulting Physician (Ophthalmology)    Chief complaint: Crystal on CKD stage III  RUQ pain       History of Present Illness: Lio Griffith is a 86 y.o. male with PMHx significant for BRAMBILA cirrhosis, HTN, CKDIII (1.7-1.8), PAF on Xarelto, DMII, HLD who presents to [facility] with complaints of RUQ abdominal pain. He has been having RUQ pain for almost 5-7 days. Pain is accompanied by nausea but no vomiting. Nephrology has been consulted for evaluation of worsening renal function noted on this admission. Patient at baseline has hx of stage III CKD. Cr btw 1.7-2.0mg/dl. Most recent cr 3.0-3.7mg/dl. Other labs remarkable for bicarb 21 Na 134. He rajan using any NSAID. He is currently getting IV abx for cholecystitis. Plan for cholecystectomy today with gen surgery     Review of Systems   Constitutional: Negative.    HENT: Negative.    Respiratory: Negative.    Cardiovascular: Negative.    Gastrointestinal: Positive for abdominal pain and nausea.   Genitourinary: Negative.    Musculoskeletal: Negative.    Skin: Negative.    Hematological: Negative.    Psychiatric/Behavioral: Negative.         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 Good control   ,   Past Surgical History:   Procedure Laterality Date   • ADENOIDECTOMY     • APPENDECTOMY     • HIP SURGERY Right 03/12/2013    Right JOYA    • JOINT REPLACEMENT     • THYROIDECTOMY     • TONSILLECTOMY     • VITRECTOMY PARS PLANA W/ REPAIR OF MACULAR HOLE     ,   Family History   Problem Relation Age of Onset   • Cancer Father    • Diabetes Brother    • Hypertension Brother    • Obesity Brother    ,   Social History     Socioeconomic  History   • Marital status:    Tobacco Use   • Smoking status: Former Smoker     Packs/day: 0.50     Types: Cigarettes     Start date:      Quit date:      Years since quittin.6   • Smokeless tobacco: Never Used   Substance and Sexual Activity   • Alcohol use: No   • Drug use: No   • Sexual activity: Defer     E-cigarette/Vaping     E-cigarette/Vaping Substances     E-cigarette/Vaping Devices       ,   Medications Prior to Admission   Medication Sig Dispense Refill Last Dose   • allopurinol (ZYLOPRIM) 300 MG tablet TAKE ONE TABLET BY MOUTH DAILY 90 tablet 0    • amLODIPine (NORVASC) 5 MG tablet Take 5 mg by mouth Daily.      • cholecalciferol (VITAMIN D3) 1000 units tablet Take 2,000 Units by mouth Daily.      • fluticasone (FLONASE) 50 MCG/ACT nasal spray 2 sprays into the nostril(s) as directed by provider Daily. 1 bottle 5    • glucose blood test strip Monitor glucose once daily before breakfast. 100 each 3    • glucose monitor monitoring kit 1 each Every Morning Before Breakfast. 1 each 0    • levothyroxine (SYNTHROID, LEVOTHROID) 175 MCG tablet Take 1 tablet by mouth Daily. 30 tablet 5    • magnesium oxide (MAG-OX) 400 MG tablet Take 400 mg by mouth Daily.      • montelukast (SINGULAIR) 10 MG tablet Take 1 tablet by mouth Every Night. 90 tablet 3    • Multiple Vitamins-Minerals (MULTIVITAMIN ADULT PO) Take  by mouth.      • ONETOUCH DELICA LANCETS 33G misc Use as directed once daily. 100 each 3    • rosuvastatin (CRESTOR) 20 MG tablet Take 20 mg by mouth Daily.      • tolterodine (DETROL) 2 MG tablet Take 2 mg by mouth 2 (Two) Times a Day.      • XARELTO 15 MG tablet TAKE ONE TABLET BY MOUTH DAILY WITH DINNER 90 tablet 0    • atenolol (TENORMIN) 100 MG tablet Take 100 mg by mouth Daily.      • cephalexin (KEFLEX) 500 MG capsule Take 1 capsule by mouth 3 (Three) Times a Day. 21 capsule 0    • lovastatin (MEVACOR) 20 MG tablet TAKE ONE TABLET BY MOUTH ONCE NIGHTLY 90 tablet 0    • quinapril  (ACCUPRIL) 40 MG tablet Take 1 tablet by mouth Daily. 90 tablet 1     and Scheduled Meds:  ertapenem, 500 mg, Intravenous, Nightly  famotidine, 20 mg, Intravenous, Once  famotidine, 20 mg, Oral, Once  insulin lispro, 0-7 Units, Subcutaneous, TID AC  levothyroxine, 175 mcg, Oral, Q AM  [MAR Hold] rosuvastatin, 10 mg, Oral, Daily  sodium chloride, 10 mL, Intravenous, Q12H  sodium chloride, 10 mL, Intravenous, Q12H  sodium chloride, 10 mL, Intravenous, Q12H        Objective     Vital Signs  Temp:  [98.1 °F (36.7 °C)-99.4 °F (37.4 °C)] 99.4 °F (37.4 °C)  Heart Rate:  [] 83  Resp:  [18] 18  BP: (104-123)/(56-77) 121/77    No intake/output data recorded.  No intake/output data recorded.    Physical Exam  Constitutional:       General: He is not in acute distress.     Appearance: Normal appearance. He is not ill-appearing.   HENT:      Head: Normocephalic and atraumatic.      Nose: Nose normal.      Mouth/Throat:      Mouth: Mucous membranes are moist.   Eyes:      Extraocular Movements: Extraocular movements intact.      Pupils: Pupils are equal, round, and reactive to light.   Cardiovascular:      Rate and Rhythm: Normal rate and regular rhythm.      Pulses: Normal pulses.      Heart sounds: Normal heart sounds. No murmur heard.    No friction rub.   Pulmonary:      Effort: Pulmonary effort is normal. No respiratory distress.      Breath sounds: Normal breath sounds. No stridor.   Abdominal:      General: Abdomen is flat.      Tenderness: There is abdominal tenderness.   Musculoskeletal:      Right lower leg: No edema.      Left lower leg: No edema.   Skin:     General: Skin is warm.   Neurological:      General: No focal deficit present.      Mental Status: He is alert and oriented to person, place, and time.   Psychiatric:         Mood and Affect: Mood normal.         Results Review:    I reviewed the patient's new clinical results.    WBC WBC   Date Value Ref Range Status   08/19/2022 9.06 3.40 - 10.80 10*3/mm3  Final   08/18/2022 12.51 (H) 3.40 - 10.80 10*3/mm3 Final      HGB Hemoglobin   Date Value Ref Range Status   08/19/2022 11.7 (L) 13.0 - 17.7 g/dL Final   08/18/2022 14.2 13.0 - 17.7 g/dL Final      HCT Hematocrit   Date Value Ref Range Status   08/19/2022 34.2 (L) 37.5 - 51.0 % Final   08/18/2022 41.4 37.5 - 51.0 % Final      Platlets No results found for: LABPLAT   MCV MCV   Date Value Ref Range Status   08/19/2022 92.4 79.0 - 97.0 fL Final   08/18/2022 92.0 79.0 - 97.0 fL Final          Sodium Sodium   Date Value Ref Range Status   08/19/2022 134 (L) 136 - 145 mmol/L Final   08/18/2022 131 (L) 136 - 145 mmol/L Final      Potassium Potassium   Date Value Ref Range Status   08/19/2022 3.7 3.5 - 5.2 mmol/L Final   08/18/2022 4.8 3.5 - 5.2 mmol/L Final     Comment:     Specimen hemolyzed.  Results may be affected.      Chloride Chloride   Date Value Ref Range Status   08/19/2022 101 98 - 107 mmol/L Final   08/18/2022 94 (L) 98 - 107 mmol/L Final      CO2 CO2   Date Value Ref Range Status   08/19/2022 21.0 (L) 22.0 - 29.0 mmol/L Final   08/18/2022 26.0 22.0 - 29.0 mmol/L Final      BUN BUN   Date Value Ref Range Status   08/19/2022 64 (H) 8 - 23 mg/dL Final   08/18/2022 65 (H) 8 - 23 mg/dL Final      Creatinine Creatinine   Date Value Ref Range Status   08/19/2022 3.00 (H) 0.76 - 1.27 mg/dL Final   08/18/2022 3.77 (H) 0.76 - 1.27 mg/dL Final      Calcium Calcium   Date Value Ref Range Status   08/19/2022 8.6 8.6 - 10.5 mg/dL Final   08/18/2022 9.1 8.6 - 10.5 mg/dL Final      PO4 No results found for: CAPO4   Albumin Albumin   Date Value Ref Range Status   08/19/2022 2.60 (L) 3.50 - 5.20 g/dL Final   08/18/2022 3.50 3.50 - 5.20 g/dL Final      Magnesium No results found for: MG   Uric Acid No results found for: URICACID         Assessment & Plan       Essential hypertension    Type 2 diabetes mellitus with stage 3 chronic kidney disease, without long-term current use of insulin (HCC)    Chronic atrial fibrillation  (HCC)    Hypercholesterolemia    Stage 3 chronic kidney disease (HCC)    Coronary artery disease involving native coronary artery of native heart without angina pectoris    Diabetic nephropathy associated with type 2 diabetes mellitus (HCC)    BRAMBILA (nonalcoholic steatohepatitis)    Acute renal failure (ARF) (HCC)    Sepsis, unspecified organism (HCC)    Acute cholecystitis    Sepsis without acute organ dysfunction, due to unspecified organism (HCC)      Assessment & Plan    ALFONZO on CKD stage III:Baseline cr~ 1.7-2.0mg/dl. Cr on this admission 3.7. Suspect etiology of ALFONZO hemodynamic injury in the setting of acute cholecystitis and poor po intake    Met acidosis: Mild in the setting of ALFONZO on CKD    Hypovolemia: Due to poo po intake    Acute cholecystitis: On abx plan for surgery today       Recs  ALFONZO on CKD in the setting of acute cholecystitis. Agree with IV fluids for volume expansion. Will swtich fluid to LR due to low chloride content. Continue LR @ 100cc/hr. Abx per primary service. Dose meds to eGFR. Avoid nephrotoxic agents. NO indication of dialysis      I discussed the patients findings and my recommendations with patient    Grupo Howard MD  08/19/22  15:01 EDT

## 2022-08-20 LAB
ANION GAP SERPL CALCULATED.3IONS-SCNC: 11 MMOL/L (ref 5–15)
BUN SERPL-MCNC: 61 MG/DL (ref 8–23)
BUN/CREAT SERPL: 25 (ref 7–25)
CALCIUM SPEC-SCNC: 8.9 MG/DL (ref 8.6–10.5)
CHLORIDE SERPL-SCNC: 103 MMOL/L (ref 98–107)
CO2 SERPL-SCNC: 21 MMOL/L (ref 22–29)
CREAT SERPL-MCNC: 2.44 MG/DL (ref 0.76–1.27)
EGFRCR SERPLBLD CKD-EPI 2021: 25.4 ML/MIN/1.73
GLUCOSE BLDC GLUCOMTR-MCNC: 129 MG/DL (ref 70–130)
GLUCOSE BLDC GLUCOMTR-MCNC: 151 MG/DL (ref 70–130)
GLUCOSE BLDC GLUCOMTR-MCNC: 155 MG/DL (ref 70–130)
GLUCOSE SERPL-MCNC: 179 MG/DL (ref 65–99)
POTASSIUM SERPL-SCNC: 4.5 MMOL/L (ref 3.5–5.2)
SODIUM SERPL-SCNC: 135 MMOL/L (ref 136–145)

## 2022-08-20 PROCEDURE — 97116 GAIT TRAINING THERAPY: CPT | Performed by: PHYSICAL THERAPIST

## 2022-08-20 PROCEDURE — 99232 SBSQ HOSP IP/OBS MODERATE 35: CPT | Performed by: INTERNAL MEDICINE

## 2022-08-20 PROCEDURE — 80048 BASIC METABOLIC PNL TOTAL CA: CPT | Performed by: INTERNAL MEDICINE

## 2022-08-20 PROCEDURE — 97161 PT EVAL LOW COMPLEX 20 MIN: CPT | Performed by: PHYSICAL THERAPIST

## 2022-08-20 PROCEDURE — 63710000001 INSULIN LISPRO (HUMAN) PER 5 UNITS: Performed by: SURGERY

## 2022-08-20 PROCEDURE — 25010000002 HEPARIN (PORCINE) PER 1000 UNITS: Performed by: SURGERY

## 2022-08-20 PROCEDURE — 25010000002 ERTAPENEM PER 500 MG: Performed by: SURGERY

## 2022-08-20 PROCEDURE — 82962 GLUCOSE BLOOD TEST: CPT

## 2022-08-20 RX ORDER — FAMOTIDINE 20 MG/1
20 TABLET, FILM COATED ORAL
Status: DISCONTINUED | OUTPATIENT
Start: 2022-08-20 | End: 2022-08-21

## 2022-08-20 RX ORDER — SODIUM CHLORIDE, SODIUM LACTATE, POTASSIUM CHLORIDE, CALCIUM CHLORIDE 600; 310; 30; 20 MG/100ML; MG/100ML; MG/100ML; MG/100ML
75 INJECTION, SOLUTION INTRAVENOUS CONTINUOUS
Status: DISCONTINUED | OUTPATIENT
Start: 2022-08-20 | End: 2022-08-21

## 2022-08-20 RX ADMIN — HEPARIN SODIUM 5000 UNITS: 5000 INJECTION INTRAVENOUS; SUBCUTANEOUS at 05:03

## 2022-08-20 RX ADMIN — SODIUM CHLORIDE 125 ML/HR: 9 INJECTION, SOLUTION INTRAVENOUS at 00:17

## 2022-08-20 RX ADMIN — SODIUM CHLORIDE, POTASSIUM CHLORIDE, SODIUM LACTATE AND CALCIUM CHLORIDE 75 ML/HR: 600; 310; 30; 20 INJECTION, SOLUTION INTRAVENOUS at 12:31

## 2022-08-20 RX ADMIN — LEVOTHYROXINE SODIUM 175 MCG: 150 TABLET ORAL at 05:03

## 2022-08-20 RX ADMIN — FAMOTIDINE 20 MG: 20 TABLET ORAL at 17:32

## 2022-08-20 RX ADMIN — ROSUVASTATIN CALCIUM 10 MG: 10 TABLET, COATED ORAL at 08:53

## 2022-08-20 RX ADMIN — Medication 10 ML: at 20:26

## 2022-08-20 RX ADMIN — HEPARIN SODIUM 5000 UNITS: 5000 INJECTION INTRAVENOUS; SUBCUTANEOUS at 20:18

## 2022-08-20 RX ADMIN — INSULIN LISPRO 2 UNITS: 100 INJECTION, SOLUTION INTRAVENOUS; SUBCUTANEOUS at 11:29

## 2022-08-20 RX ADMIN — HEPARIN SODIUM 5000 UNITS: 5000 INJECTION INTRAVENOUS; SUBCUTANEOUS at 13:12

## 2022-08-20 RX ADMIN — ERTAPENEM 500 MG: 1 INJECTION INTRAMUSCULAR; INTRAVENOUS at 20:18

## 2022-08-20 RX ADMIN — INSULIN LISPRO 2 UNITS: 100 INJECTION, SOLUTION INTRAVENOUS; SUBCUTANEOUS at 17:31

## 2022-08-20 RX ADMIN — SODIUM CHLORIDE 125 ML/HR: 9 INJECTION, SOLUTION INTRAVENOUS at 08:49

## 2022-08-20 NOTE — PROGRESS NOTES
[facility] Medicine Services  PROGRESS NOTE    Patient Name: Lio Griffith  : 1935  MRN: [id number]    Date of Admission: 2022  Primary Care Physician: Johnnie Wylie MD    Subjective   Subjective     CC: f/u CCY    HPI: Up in bed. Says he is doing well. Pain controlled. No complaints.     ROS:  Gen- No fevers, chills  CV- No chest pain, palpitations  Resp- No cough, dyspnea  GI- No N/V/D, abd pain     Objective   Objective     Vital Signs:   Temp:  [97.7 °F (36.5 °C)-99.4 °F (37.4 °C)] 98.6 °F (37 °C)  Heart Rate:  [] 78  Resp:  [16-20] 20  BP: (115-134)/(65-84) 133/82  Flow (L/min):  [2-4] 2     Physical Exam:  Constitutional: No acute distress, awake, alert  HENT: NCAT, mucous membranes moist  Respiratory: Clear to auscultation bilaterally, respiratory effort normal   Cardiovascular: RRR, no murmurs, rubs, or gallops  Gastrointestinal: Positive bowel sounds, soft, nontender, nondistended, obese, lap incisions dressed, galindo  Musculoskeletal: No bilateral ankle edema  Psychiatric: Appropriate affect, cooperative  Neurologic: Oriented x 3, strength symmetric in all extremities, Cranial Nerves grossly intact to confrontation, speech clear  Skin: No rashes    Results Reviewed:  LAB RESULTS:      Lab 22  0316 08/18/22  1752 08/15/22  1256   WBC 9.06 12.51* 10.76   HEMOGLOBIN 11.7* 14.2 15.2   HEMATOCRIT 34.2* 41.4 45.0   PLATELETS 94* 126* 171   NEUTROS ABS 7.90* 10.84* 9.30*   IMMATURE GRANS (ABS) 0.03 0.03 0.04   LYMPHS ABS 0.21* 0.35* 0.43*   MONOS ABS 0.76 1.10* 0.86   EOS ABS 0.14 0.15 0.09   MCV 92.4 92.0 93.9   PROCALCITONIN  --  9.94*  --    LACTATE  --  1.4  --          Lab 22  0637 22  1752 08/15/22  1256   SODIUM 134* 131* 134*   POTASSIUM 3.7 4.8 4.0   CHLORIDE 101 94* 93*   CO2 21.0* 26.0 24.0   ANION GAP 12.0 11.0 17.0*   BUN 64* 65* 31*   CREATININE 3.00* 3.77* 1.63*   EGFR 19.9* 15.1* 41.3*   GLUCOSE 124* 112* 136*   CALCIUM 8.6 9.1  9.8         Lab 08/19/22  0637 08/18/22  1752 08/15/22  1256   TOTAL PROTEIN 5.7* 5.9* 6.9   ALBUMIN 2.60* 3.50 4.40   GLOBULIN 3.1 2.4 2.5   ALT (SGPT) 40 39 17   AST (SGOT) 74* 84* 23   BILIRUBIN 0.7 0.9 1.0   ALK PHOS 139* 107 101   AMYLASE  --   --  51   LIPASE  --  18 21         Lab 08/15/22  1256   TROPONIN T <0.010             Lab 08/19/22  1057   ABO TYPING O   RH TYPING Positive   ANTIBODY SCREEN Negative         Brief Urine Lab Results  (Last result in the past 365 days)      Color   Clarity   Blood   Leuk Est   Nitrite   Protein   CREAT   Urine HCG        08/19/22 0212 Yellow   Cloudy   Trace   Moderate (2+)   Negative   30 mg/dL (1+)                 Microbiology Results Abnormal     Procedure Component Value - Date/Time    Body Fluid Culture - Bile, Gallbladder [[id number]] Collected: 08/19/22 1541    Lab Status: Preliminary result Specimen: Bile from Gallbladder Updated: 08/19/22 2042     Gram Stain No WBCs or organisms seen    Blood Culture - Blood, Arm, Left [[id number]]  (Normal) Collected: 08/18/22 1830    Lab Status: Preliminary result Specimen: Blood from Arm, Left Updated: 08/19/22 2002     Blood Culture No growth at 24 hours    Narrative:      Pediatric bottle only      Blood Culture - Blood, Wrist, Right [[id number]]  (Normal) Collected: 08/18/22 1905    Lab Status: Preliminary result Specimen: Blood from Wrist, Right Updated: 08/19/22 2002     Blood Culture No growth at 24 hours    COVID PRE-OP / PRE-PROCEDURE SCREENING ORDER (NO ISOLATION) - Swab, Nasopharynx [[id number]]  (Normal) Collected: 08/18/22 1753    Lab Status: Final result Specimen: Swab from Nasopharynx Updated: 08/18/22 1836    Narrative:      The following orders were created for panel order COVID PRE-OP / PRE-PROCEDURE SCREENING ORDER (NO ISOLATION) - Swab, Nasopharynx.  Procedure                               Abnormality         Status                     ---------                               -----------         ------                      COVID-19 and FLU A/B PCR...[[id number]]  Normal              Final result                 Please view results for these tests on the individual orders.    COVID-19 and FLU A/B PCR - Swab, Nasopharynx [[id number]]  (Normal) Collected: 08/18/22 1753    Lab Status: Final result Specimen: Swab from Nasopharynx Updated: 08/18/22 1836     COVID19 Not Detected     Influenza A PCR Not Detected     Influenza B PCR Not Detected    Narrative:      Fact sheet for providers: https://www.fda.gov/media/196535/download    Fact sheet for patients: https://www.fda.gov/media/846067/download    Test performed by PCR.          CT Abdomen With Contrast    Result Date: 8/18/2022  DATE OF EXAM: 8/18/2022 2:51 PM  PROCEDURE: CT ABDOMEN W CONTRAST-  INDICATIONS: R10.9; R10.9-Unspecified abdominal pain  COMPARISON: 3/15/2016  TECHNIQUE: Routine transaxial slices were obtained through the abdomen after the intravenous administration of 60 mL of Isovue 300. Reconstructed coronal and sagittal images were also obtained. Automated exposure control and iterative construction methods were used.  The radiation dose reduction device was turned on for each scan per the ALARA (As Low as Reasonably Achievable) protocol.  FINDINGS: The gallbladder is markedly inflamed, thick-walled and edematous, with average gallbladder wall thickness of almost 2 cm. A large calcified gallstone is again seen, as on prior study, measuring up to 3.5 cm in diameter. There is moderately extensive surrounding inflammatory fat stranding, and mild secondary inflammation of the adjacent second portion of the duodenum. There is no evidence of bowel obstruction. Oral contrast passes normally through the stomach, duodenum and into the jejunum.  Elsewhere at the level of this scan, the included lower lungs appear clear. There is diffuse fatty liver change. Multiple bilateral renal cysts are again noted as well as a couple of nonobstructing left lower pole renal calculi up to  3 or 4 mm in maximal diameter. Spleen and adrenal glands appear within normal limits.  Pancreas is relatively atrophic, as is often seen in a patient this age, however, today's study now shows isolated marked dilatation of pancreatic duct confined to the tip of the pancreatic tail, up to approximately 8 mm in diameter as visible on images 38 through 46. There are associated calcifications and findings could reflect sequelae of previous pancreatitis. An underlying pancreatic mass cannot be entirely excluded and there is a focal dense area of parenchyma at the level of potential ductal obstruction where an underlying isodense nodule up to 14 mm in direct be present, although no well-defined mass is seen. Please refer to axial image 47 of series 2. No active inflammation is seen here.  No free air ascites, adenopathy or other acute inflammatory focus is seen. Delayed venous phase images show no evidence of obstructive uropathy. Bony structures appear to be intact. There is advanced multilevel lumbar degenerative disc disease.            Impression:  1. Findings consistent with acute cholecystitis with markedly thickened and edematous gallbladder wall, 3.5 cm gallstone, and surrounding inflammatory change. No evidence of intra or extrahepatic biliary ductal dilatation. 2. Atrophic appearance of the pancreatic tail, associated marked ductal dilatation of the pancreatic tail only, and associated pancreatic parenchymal calcifications. These findings could reflect sequelae of previous pancreatitis, possibly ductal obstruction due to small occult neoplasm of the junction of the body and tail the pancreas, although no well-defined pancreatic lesion is seen. Stricture from prior pancreatitis might cause this appearance. Similar changes are present on the patient's multiple prior unenhanced exams, but are more visible today as that this is the only contrast enhanced scan of the abdomen. Chronic disease is favored recent ductal  obstruction. 3. Multiple renal cysts and small nonobstructing left lower pole renal calculi. No evidence of obstructive uropathy.    Note: Phone findings were called to Dr. Wylie nurse at approximately 3:15 PM 8/18/2022. They will contact the patient with further instructions, while he remains at the outpatient imaging Center.  This report was finalized on 8/18/2022 3:22 PM by Dr. Parish Granados MD.        Results for orders placed during the hospital encounter of 06/16/21    Adult Transthoracic Echo Complete W/ Cont if Necessary Per Protocol    Interpretation Summary  · Estimated left ventricular EF = 69% Left ventricular systolic function is normal.  · Left ventricular wall thickness is consistent with mild concentric hypertrophy.  · Estimated right ventricular systolic pressure from tricuspid regurgitation is moderately elevated (45-55 mmHg).  · The right atrial cavity is mild to moderately dilated.  · Left atrial volume is mildly increased.  · The right ventricular cavity is mildly dilated.      I have reviewed the medications:  Scheduled Meds:ertapenem, 500 mg, Intravenous, Nightly  famotidine, 20 mg, Oral, BID AC  heparin (porcine), 5,000 Units, Subcutaneous, Q8H  insulin lispro, 0-7 Units, Subcutaneous, TID AC  levothyroxine, 175 mcg, Oral, Q AM  rosuvastatin, 10 mg, Oral, Daily  sodium chloride, 10 mL, Intravenous, Q12H      Continuous Infusions:sodium chloride, 75 mL/hr, Last Rate: 125 mL/hr (08/20/22 0849)      PRN Meds:.•  acetaminophen  •  dextrose  •  dextrose  •  glucagon (human recombinant)  •  HYDROcodone-acetaminophen  •  HYDROmorphone **AND** naloxone  •  ondansetron  •  ondansetron  •  Sodium Chloride (PF)  •  [COMPLETED] Insert peripheral IV **AND** sodium chloride  •  sodium chloride    Assessment & Plan   Assessment & Plan     Active Hospital Problems    Diagnosis  POA   • Acute renal failure (ARF) ([facility]) [N17.9]  Yes   • Sepsis, unspecified organism (HCC) [A41.9]  Yes   • Acute cholecystitis  [K81.0]  Yes   • Sepsis without acute organ dysfunction, due to unspecified organism (HCC) [A41.9]  Yes   • BRAMBILA (nonalcoholic steatohepatitis) [K75.81]  Yes   • Diabetic nephropathy associated with type 2 diabetes mellitus (HCC) [E11.21]  Yes   • Coronary artery disease involving native coronary artery of native heart without angina pectoris [I25.10]  Yes   • Stage 3 chronic kidney disease (HCC) [N18.30]  Yes   • Hypercholesterolemia [E78.00]  Yes   • Essential hypertension [I10]  Yes   • Chronic atrial fibrillation (HCC) [I48.20]  Yes   • Type 2 diabetes mellitus with stage 3 chronic kidney disease, without long-term current use of insulin (HCC) [E11.22, N18.30]  Yes      Resolved Hospital Problems   No resolved problems to display.        Brief Hospital Course to date:  Lio Griffith is a 86 y.o. male with PMHx significant for BRAMBILA cirrhosis, HTN, CKDIII (1.7-1.8), PAF on Xarelto, DMII, CAD, HLD who presents to [facility] with complaints of RUQ abdominal pain.     Acute Cholecystitis  - CT scan consistent with acute cholecystitis with markedly thickened and edematous gallbladder wall, 3.5 cm gallstone, and surrounding inflammatory change  - Discussed briefly with Dr. Brownlee. S/P CCY.  - PRN anti-emetics, PRN pain control, IVF     Acute Renal Failure on CKD IIIb  - His baseline appears to be around 1.7-1.8. Suspect worsening due to volume depletion assoc w/ above. Some better today after IVF.  - Nephrology following.  - BMP pending.     PAF:  - Resume Xarelto when okay with Dr. Brownlee.  - Rate controlled.     BRAMBILA Cirrhosis:  TCP  - followed by Dr. Suarez  - stable     DMII:  - SSI for now     HTN:  - hold anti-hypertensives as he is borderline hypotensive in the ED     HLD:  - continue statin     CAD:  - medically managed     This patient's problems and plans were partially entered by my partner and updated as appropriate by me 08/20/22.     Expected Discharge Location and Transportation: home  Expected Discharge  Date: 8/21.    DVT prophylaxis:  Medical and mechanical DVT prophylaxis orders are present.     AM-PAC 6 Clicks Score (PT): 22 (08/19/22 2006)    CODE STATUS:   Code Status and Medical Interventions:   Ordered at: 08/18/22 1954     Level Of Support Discussed With:    Patient     Code Status (Patient has no pulse and is not breathing):    CPR (Attempt to Resuscitate)     Medical Interventions (Patient has pulse or is breathing):    Full Support       Rosanna Roberson II, DO  08/20/22

## 2022-08-20 NOTE — PLAN OF CARE
Goal Outcome Evaluation:  Plan of Care Reviewed With: patient           Outcome Evaluation: PT evaluation completed.  Pt transferred supine-->sit with MinAx1, stood with CGA, and ambulated 200 feet using rw with CGAx1.  Pt became shaky and fatigued around ~170 feet, but safely made it back to room.  Skilled PT services warranted to improve mobility and safety.  Recommend home with assist and HH PT at d/c.

## 2022-08-20 NOTE — PROGRESS NOTES
"General Surgery Post op Follow Up Note    Subjective:   Feels better today.    /82 (BP Location: Left arm, Patient Position: Lying)   Pulse 85   Temp 98.6 °F (37 °C) (Oral)   Resp 20   Ht 175.3 cm (69.02\")   Wt 100 kg (220 lb 7.4 oz)   SpO2 96%   BMI 32.54 kg/m²     General Appearance:  in no acute distress  Abdomen: incision is clean and dry, KATY benign    CBC  Results from last 7 days   Lab Units 08/19/22  0316   WBC 10*3/mm3 9.06   HEMOGLOBIN g/dL 11.7*   HEMATOCRIT % 34.2*   PLATELETS 10*3/mm3 94*       CMP/BMP  Results from last 7 days   Lab Units 08/19/22  0637   SODIUM mmol/L 134*   POTASSIUM mmol/L 3.7   CHLORIDE mmol/L 101   CO2 mmol/L 21.0*   BUN mg/dL 64*   CREATININE mg/dL 3.00*   CALCIUM mg/dL 8.6   BILIRUBIN mg/dL 0.7   ALK PHOS U/L 139*   ALT (SGPT) U/L 40   AST (SGOT) U/L 74*   GLUCOSE mg/dL 124*         ASSESSMENT/PLAN:    POD 1 status post laparoscopic cholecystectomy for acute gangrenous cholecystitis    Tolerating clear liquid diet, I will advance him to a full liquid diet.  Pain well controlled.  Today's labs are pending.  Mobilize & pulmonary toilet.  May resume Xarelto tomorrow, 8/21/2022.    Quoc Brownlee MD  8/20/2022  09:53 EDT  "

## 2022-08-20 NOTE — THERAPY EVALUATION
Patient Name: Lio Griffith  : 1935    MRN: [id number]                              Today's Date: 2022       Admit Date: 2022    Visit Dx:     ICD-10-CM ICD-9-CM   1. Sepsis without acute organ dysfunction, due to unspecified organism (HCC)  A41.9 038.9     995.91   2. Acute cholecystitis  K81.0 575.0   3. Acute renal failure, unspecified acute renal failure type (HCC)  N17.9 584.9     Patient Active Problem List   Diagnosis   • Calculus of kidney   • Essential hypertension   • Type 2 diabetes mellitus with stage 3 chronic kidney disease, without long-term current use of insulin (HCC)   • Acquired hypothyroidism   • Malignant neoplasm of thyroid gland (HCC)   • Chronic low back pain   • Chronic atrial fibrillation (HCC)   • Hypercholesterolemia   • Both eyes affected by mild nonproliferative diabetic retinopathy with macular edema, associated with type 2 diabetes mellitus (HCC)   • Stage 3 chronic kidney disease (HCC)   • History of colonic polyps   • Lymphocytic colitis   • Coronary artery disease involving native coronary artery of native heart without angina pectoris   • Hemorrhoids   • Osteoarthritis   • Peripheral neuropathy   • Class 2 severe obesity due to excess calories with serious comorbidity and body mass index (BMI) of 38.0 to 38.9 in adult (HCC)   • Obstructive sleep apnea   • Malignant melanoma of torso excluding breast (HCC)   • Diabetic nephropathy associated with type 2 diabetes mellitus (HCC)   • Calculus of gallbladder   • BRAMBILA (nonalcoholic steatohepatitis)   • Acute renal failure (ARF) (HCC)   • Sepsis, unspecified organism (HCC)   • Acute cholecystitis   • Sepsis without acute organ dysfunction, due to unspecified organism (HCC)     Past Medical History:   Diagnosis Date   • Cancer (HCC)    • Hypertension    • Hypothyroidism    • Kidney stone    • Type 2 diabetes mellitus with renal complication (HCC) 1988    A1C 5% per patient in March. Good control     Past Surgical  History:   Procedure Laterality Date   • ADENOIDECTOMY     • APPENDECTOMY     • HIP SURGERY Right 03/12/2013    Right JOYA    • JOINT REPLACEMENT     • THYROIDECTOMY     • TONSILLECTOMY     • VITRECTOMY PARS PLANA W/ REPAIR OF MACULAR HOLE        General Information     Row Name 08/20/22 1501          Physical Therapy Time and Intention    Document Type evaluation  -LM     Mode of Treatment individual therapy;physical therapy  -LM     Row Name 08/20/22 1501          General Information    Patient Profile Reviewed yes  -LM     Prior Level of Function independent:;all household mobility;gait;ADL's  -LM     Existing Precautions/Restrictions fall;other (see comments)  Abd Incision; KATY Drain  -LM     Barriers to Rehab none identified  -LM     Row Name 08/20/22 1501          Living Environment    People in Home spouse  -LM     Row Name 08/20/22 1501          Home Main Entrance    Number of Stairs, Main Entrance one  Small threshold step  -LM     Stair Railings, Main Entrance none  -LM     Row Name 08/20/22 1501          Stairs Within Home, Primary    Number of Stairs, Within Home, Primary none  -LM     Row Name 08/20/22 1501          Cognition    Orientation Status (Cognition) oriented x 3  -LM     Row Name 08/20/22 1501          Safety Issues, Functional Mobility    Safety Issues Affecting Function (Mobility) safety precaution awareness;safety precautions follow-through/compliance;sequencing abilities  -LM     Impairments Affecting Function (Mobility) balance;endurance/activity tolerance;pain;strength  -LM           User Key  (r) = Recorded By, (t) = Taken By, (c) = Cosigned By    Initials Name Provider Type    LM Emma Scruggs PT Physical Therapist               Mobility     Row Name 08/20/22 1502          Bed Mobility    Bed Mobility supine-sit  -LM     Supine-Sit [place] (Bed Mobility) minimum assist (75% patient effort);1 person assist;verbal cues  -LM     Assistive Device (Bed Mobility) bed rails;head of bed  elevated  -LM     Comment, (Bed Mobility) Vc's for sequencing.  -LM     Row Name 08/20/22 1502          Sit-Stand Transfer    Sit-Stand [place] (Transfers) contact guard;1 person assist;verbal cues  -LM     Assistive Device (Sit-Stand Transfers) walker, front-wheeled  -LM     Row Name 08/20/22 1502          Gait/Stairs (Locomotion)    [place] Level (Gait) contact guard;1 person assist;verbal cues  -LM     Assistive Device (Gait) walker, front-wheeled  -LM     Distance in Feet (Gait) 200  -LM     Deviations/Abnormal Patterns (Gait) citlaly decreased;gait speed decreased;stride length decreased  -LM     Bilateral Gait Deviations forward flexed posture  -LM     Comment, (Gait/Stairs) Vc's for upright posture.  Pt became very fatigued around 170 feet of gait, but was able to safely make it back to recliner.  -LM           User Key  (r) = Recorded By, (t) = Taken By, (c) = Cosigned By    Initials Name Provider Type    LM Emma Scruggs, PT Physical Therapist               Obj/Interventions     Row Name 08/20/22 1504          Range of Motion Comprehensive    General Range of Motion lower extremity range of motion deficits identified  -LM     Comment, General Range of Motion BLE AROM WFL with exception of R hip flexion  -LM     Row Name 08/20/22 1504          Strength Comprehensive (MMT)    General Manual Muscle Testing (MMT) Assessment lower extremity strength deficits identified  -LM     Comment, General Manual Muscle Testing (MMT) Assessment BLEs grossly 4+/5 throughout with exception of R hip flex - 2+/5  -LM     Row Name 08/20/22 1504          Balance    Balance Assessment sitting static balance;standing static balance;standing dynamic balance  -LM     Static Sitting Balance independent  -LM     Position, Sitting Balance unsupported;sitting edge of bed  -LM     Static Standing Balance contact guard;1-person assist;verbal cues  -LM     Dynamic Standing Balance contact guard;1-person assist;verbal cues  -LM      Position/Device Used, Standing Balance supported;walker, front-wheeled  -LM     Row Name 08/20/22 1504          Sensory Assessment (Somatosensory)    Sensory Assessment (Somatosensory) LE sensation intact  -LM           User Key  (r) = Recorded By, (t) = Taken By, (c) = Cosigned By    Initials Name Provider Type    LM Emma Scruggs, PT Physical Therapist               Goals/Plan     Row Name 08/20/22 1507          Bed Mobility Goal 1 (PT)    Activity/Assistive Device (Bed Mobility Goal 1, PT) sit to supine/supine to sit  -LM     [place] Level/Cues Needed (Bed Mobility Goal 1, PT) independent  -LM     Time Frame (Bed Mobility Goal 1, PT) long term goal (LTG);10 days  -LM     Row Name 08/20/22 1507          Transfer Goal 1 (PT)    Activity/Assistive Device (Transfer Goal 1, PT) bed-to-chair/chair-to-bed  -LM     [place] Level/Cues Needed (Transfer Goal 1, PT) modified independence  -LM     Time Frame (Transfer Goal 1, PT) long term goal (LTG);10 days  -LM     Row Name 08/20/22 1507          Gait Training Goal 1 (PT)    Activity/Assistive Device (Gait Training Goal 1, PT) gait (walking locomotion);assistive device use  -LM     [place] Level (Gait Training Goal 1, PT) modified independence  -LM     Distance (Gait Training Goal 1, PT) 200 feet  -LM     Time Frame (Gait Training Goal 1, PT) long term goal (LTG);10 days  -LM     Row Name 08/20/22 1508          Therapy Assessment/Plan (PT)    Planned Therapy Interventions (PT) balance training;bed mobility training;gait training;home exercise program;motor coordination training;neuromuscular re-education;patient/family education;postural re-education;ROM (range of motion);stair training;strengthening;stretching;transfer training  -LM           User Key  (r) = Recorded By, (t) = Taken By, (c) = Cosigned By    Initials Name Provider Type    Emma Thomas PT Physical Therapist               Clinical Impression     Row Name 08/20/22 1505          Pain     Pretreatment Pain Rating 2/10  -LM     Posttreatment Pain Rating 2/10  -LM     Pain Location incisional  -LM     Pain Location - abdomen  -LM     Pain Intervention(s) Repositioned;Ambulation/increased activity  -LM     Row Name 08/20/22 0403          Plan of Care Review    Plan of Care Reviewed With patient  -LM     Outcome Evaluation PT evaluation completed.  Pt transferred supine-->sit with MinAx1, stood with CGA, and ambulated 200 feet using rw with CGAx1.  Pt became shaky and fatigued around ~170 feet, but safely made it back to room.  Skilled PT services warranted to improve mobility and safety.  Recommend home with assist and HH PT at d/c.  -LM     Row Name 08/20/22 1507          Therapy Assessment/Plan (PT)    Rehab Potential (PT) good, to achieve stated therapy goals  -LM     Criteria for Skilled Interventions Met (PT) yes;meets criteria;skilled treatment is necessary  -LM     Therapy Frequency (PT) daily  -LM     Row Name 08/20/22 1505          Vital Signs    Pre Systolic BP Rehab 127  -LM     Pre Treatment Diastolic BP 84  -LM     Pretreatment Heart Rate (beats/min) 77  -LM     Posttreatment Heart Rate (beats/min) 84  -LM     Pre SpO2 (%) 94  -LM     O2 Delivery Pre Treatment room air  -LM     Post SpO2 (%) 96  -LM     O2 Delivery Post Treatment room air  -LM     Pre Patient Position Supine  -LM     Post Patient Position Sitting  -LM     Row Name 08/20/22 1502          Positioning and Restraints    Pre-Treatment Position in bed  -LM     Post Treatment Position chair  -LM     In Chair reclined;call light within reach;encouraged to call for assist;exit alarm on;waffle cushion;notified nsg  -LM           User Key  (r) = Recorded By, (t) = Taken By, (c) = Cosigned By    Initials Name Provider Type    LM Emma Scruggs, PT Physical Therapist               Outcome Measures     Row Name 08/20/22 8672          How much help from another person do you currently need...    Turning from your back to your side while in  flat bed without using bedrails? 4  -LM     Moving from lying on back to sitting on the side of a flat bed without bedrails? 3  -LM     Moving to and from a bed to a chair (including a wheelchair)? 3  -LM     Standing up from a chair using your arms (e.g., wheelchair, bedside chair)? 3  -LM     Climbing 3-5 steps with a railing? 3  -LM     To walk in hospital room? 3  -LM     AM-PAC 6 Clicks Score (PT) 19  -LM     Highest level of mobility 6 --> Walked 10 steps or more  -LM     Row Name 08/20/22 1507          Functional Assessment    Outcome Measure Options AM-PAC 6 Clicks Basic Mobility (PT)  -           User Key  (r) = Recorded By, (t) = Taken By, (c) = Cosigned By    Initials Name Provider Type    LM Emma Scruggs, PT Physical Therapist                             Physical Therapy Education                 Title: PT OT SLP Therapies (In Progress)     Topic: Physical Therapy (In Progress)     Point: Mobility training (Done)     Learning Progress Summary           Patient Acceptance, E, VU by  at 8/20/2022 1508                   Point: Home exercise program (Not Started)     Learner Progress:  Not documented in this visit.          Point: Precautions (Done)     Learning Progress Summary           Patient Acceptance, E, VU by LM at 8/20/2022 1508                               User Key     Initials Effective Dates Name Provider Type Discipline     06/16/21 -  Emma Scruggs, PT Physical Therapist PT              PT Recommendation and Plan  Planned Therapy Interventions (PT): balance training, bed mobility training, gait training, home exercise program, motor coordination training, neuromuscular re-education, patient/family education, postural re-education, ROM (range of motion), stair training, strengthening, stretching, transfer training  Plan of Care Reviewed With: patient  Outcome Evaluation: PT evaluation completed.  Pt transferred supine-->sit with MinAx1, stood with CGA, and ambulated 200 feet using rw  with CGAx1.  Pt became shaky and fatigued around ~170 feet, but safely made it back to room.  Skilled PT services warranted to improve mobility and safety.  Recommend home with assist and HH PT at d/c.     Time Calculation:    PT Charges     Row Name 08/20/22 1508             Time Calculation    Start Time 1410  -LM      PT Received On 08/20/22  -LM      PT Goal Re-Cert Due Date 08/30/22  -LM              Timed Charges    50695 - Gait Training Minutes  8  -LM              Untimed Charges    PT Eval/Re-eval Minutes 46  -LM              Total Minutes    Timed Charges Total Minutes 8  -LM      Untimed Charges Total Minutes 46  -LM       Total Minutes 54  -LM            User Key  (r) = Recorded By, (t) = Taken By, (c) = Cosigned By    Initials Name Provider Type    LM Emma Scruggs, PT Physical Therapist              Therapy Charges for Today     Code Description Service Date Service Provider Modifiers Qty    59591302529 HC GAIT TRAINING EA 15 MIN 8/20/2022 Emma Scruggs, PT GP 1    33527828330 HC PT EVAL LOW COMPLEXITY 4 8/20/2022 Emma Scruggs, PT GP 1          PT G-Codes  Outcome Measure Options: AM-PAC 6 Clicks Basic Mobility (PT)  AM-PAC 6 Clicks Score (PT): 19    Emma Scruggs PT  8/20/2022

## 2022-08-20 NOTE — PROGRESS NOTES
" LOS: 2 days   Patient Care Team:  Johnnie Wylie MD as PCP - General (Internal Medicine)  Dominick Hannah MD as Consulting Physician (Nephrology)  Ambar Diaz MD as Consulting Physician (Ophthalmology)    Chief Complaint: ALFONZO on CKD stage III    Subjective     No new labs this morning. Underwent cholecystectomy yesterday.   Subjective:  Symptoms:  Improved.  No shortness of breath.    Diet:  Adequate intake.    Activity level: Normal.        History taken from: patient    Objective     Vital Sign Min/Max for last 24 hours  Temp  Min: 97.7 °F (36.5 °C)  Max: 99.4 °F (37.4 °C)   BP  Min: 115/77  Max: 134/80   Pulse  Min: 73  Max: 103   Resp  Min: 16  Max: 20   SpO2  Min: 92 %  Max: 98 %   Flow (L/min)  Min: 2  Max: 4   Weight  Min: 100 kg (220 lb 7.4 oz)  Max: 100 kg (220 lb 7.4 oz)     Flowsheet Rows    Flowsheet Row First Filed Value   Admission Height 175.3 cm (69\") Documented at 08/18/2022 1601   Admission Weight 100 kg (221 lb) Documented at 08/18/2022 1601          I/O this shift:  In: 360 [P.O.:360]  Out: 25 [Drains:25]  I/O last 3 completed shifts:  In: 1300 [I.V.:1300]  Out: 275 [Urine:200; Drains:25; Blood:50]    Objective:  General Appearance:  Comfortable.    Vital signs: (most recent): Blood pressure 127/84, pulse 83, temperature 97.8 °F (36.6 °C), temperature source Oral, resp. rate 18, height 175.3 cm (69.02\"), weight 100 kg (220 lb 7.4 oz), SpO2 96 %.  Vital signs are normal.    Output: Producing urine.    HEENT: Normal HEENT exam.    Lungs:  Normal effort and normal respiratory rate.  Breath sounds clear to auscultation.  He is not in respiratory distress.  No decreased breath sounds or wheezes.    Heart: Normal rate.  Regular rhythm.  S1 normal and S2 normal.  No murmur or gallop.   Abdomen: Abdomen is soft.  Bowel sounds are normal.     Extremities: Normal range of motion.  There is no deformity, dependent edema or local swelling.    Pulses: Distal pulses are intact.  "   Neurological: Patient is alert and oriented to person, place and time.  Normal strength.    Pupils:  Pupils are equal, round, and reactive to light.              Results Review:     I reviewed the patient's new clinical results.    WBC WBC   Date Value Ref Range Status   08/19/2022 9.06 3.40 - 10.80 10*3/mm3 Final   08/18/2022 12.51 (H) 3.40 - 10.80 10*3/mm3 Final      HGB Hemoglobin   Date Value Ref Range Status   08/19/2022 11.7 (L) 13.0 - 17.7 g/dL Final   08/18/2022 14.2 13.0 - 17.7 g/dL Final      HCT Hematocrit   Date Value Ref Range Status   08/19/2022 34.2 (L) 37.5 - 51.0 % Final   08/18/2022 41.4 37.5 - 51.0 % Final      Platlets No results found for: LABPLAT   MCV MCV   Date Value Ref Range Status   08/19/2022 92.4 79.0 - 97.0 fL Final   08/18/2022 92.0 79.0 - 97.0 fL Final          Sodium Sodium   Date Value Ref Range Status   08/19/2022 134 (L) 136 - 145 mmol/L Final   08/18/2022 131 (L) 136 - 145 mmol/L Final      Potassium Potassium   Date Value Ref Range Status   08/19/2022 3.7 3.5 - 5.2 mmol/L Final   08/18/2022 4.8 3.5 - 5.2 mmol/L Final     Comment:     Specimen hemolyzed.  Results may be affected.      Chloride Chloride   Date Value Ref Range Status   08/19/2022 101 98 - 107 mmol/L Final   08/18/2022 94 (L) 98 - 107 mmol/L Final      CO2 CO2   Date Value Ref Range Status   08/19/2022 21.0 (L) 22.0 - 29.0 mmol/L Final   08/18/2022 26.0 22.0 - 29.0 mmol/L Final      BUN BUN   Date Value Ref Range Status   08/19/2022 64 (H) 8 - 23 mg/dL Final   08/18/2022 65 (H) 8 - 23 mg/dL Final      Creatinine Creatinine   Date Value Ref Range Status   08/19/2022 3.00 (H) 0.76 - 1.27 mg/dL Final   08/18/2022 3.77 (H) 0.76 - 1.27 mg/dL Final      Calcium Calcium   Date Value Ref Range Status   08/19/2022 8.6 8.6 - 10.5 mg/dL Final   08/18/2022 9.1 8.6 - 10.5 mg/dL Final      PO4 No results found for: CAPO4   Albumin Albumin   Date Value Ref Range Status   08/19/2022 2.60 (L) 3.50 - 5.20 g/dL Final   08/18/2022  3.50 3.50 - 5.20 g/dL Final      Magnesium No results found for: MG   Uric Acid No results found for: URICACID     Medication Review: yes    Assessment & Plan       Essential hypertension    Type 2 diabetes mellitus with stage 3 chronic kidney disease, without long-term current use of insulin (HCC)    Chronic atrial fibrillation (HCC)    Hypercholesterolemia    Stage 3 chronic kidney disease (HCC)    Coronary artery disease involving native coronary artery of native heart without angina pectoris    Diabetic nephropathy associated with type 2 diabetes mellitus (HCC)    BRAMBILA (nonalcoholic steatohepatitis)    Acute renal failure (ARF) (HCC)    Sepsis, unspecified organism (HCC)    Acute cholecystitis    Sepsis without acute organ dysfunction, due to unspecified organism (HCC)      Assessment & Plan     ALFONZO on CKD stage III:Baseline cr~ 1.7-2.0mg/dl. Cr on this admission 3.7. Suspect etiology of ALFONZO hemodynamic injury in the setting of acute cholecystitis and poor po intake     Met acidosis: Mild in the setting of ALFONZO on CKD     Hypovolemia: Due to poo po intake     Acute cholecystitis: On abx plan for surgery today         Recs  ALFONZO on CKD in the setting of acute cholecystitis. No new labs this morning. Agree with IV fluids for volume expansion. Will swtich fluid to LR due to low chloride content. Continue LR @ 75cc/hr. Abx per primary service. Dose meds to eGFR. Avoid nephrotoxic agents. NO indication of dialysis    Grupo Howard MD  08/20/22  11:48 EDT

## 2022-08-21 LAB
ALBUMIN SERPL-MCNC: 2.7 G/DL (ref 3.5–5.2)
ALBUMIN/GLOB SERPL: 0.9 G/DL
ALP SERPL-CCNC: 121 U/L (ref 39–117)
ALT SERPL W P-5'-P-CCNC: 63 U/L (ref 1–41)
ANION GAP SERPL CALCULATED.3IONS-SCNC: 10 MMOL/L (ref 5–15)
AST SERPL-CCNC: 75 U/L (ref 1–40)
BACTERIA FLD CULT: ABNORMAL
BILIRUB SERPL-MCNC: 0.4 MG/DL (ref 0–1.2)
BUN SERPL-MCNC: 56 MG/DL (ref 8–23)
BUN/CREAT SERPL: 25.7 (ref 7–25)
CALCIUM SPEC-SCNC: 9.1 MG/DL (ref 8.6–10.5)
CHLORIDE SERPL-SCNC: 103 MMOL/L (ref 98–107)
CO2 SERPL-SCNC: 22 MMOL/L (ref 22–29)
CREAT SERPL-MCNC: 2.18 MG/DL (ref 0.76–1.27)
DEPRECATED RDW RBC AUTO: 49.5 FL (ref 37–54)
EGFRCR SERPLBLD CKD-EPI 2021: 29.1 ML/MIN/1.73
ERYTHROCYTE [DISTWIDTH] IN BLOOD BY AUTOMATED COUNT: 15.2 % (ref 12.3–15.4)
GLOBULIN UR ELPH-MCNC: 3.1 GM/DL
GLUCOSE BLDC GLUCOMTR-MCNC: 125 MG/DL (ref 70–130)
GLUCOSE BLDC GLUCOMTR-MCNC: 125 MG/DL (ref 70–130)
GLUCOSE BLDC GLUCOMTR-MCNC: 151 MG/DL (ref 70–130)
GLUCOSE SERPL-MCNC: 140 MG/DL (ref 65–99)
GRAM STN SPEC: ABNORMAL
HCT VFR BLD AUTO: 41.4 % (ref 37.5–51)
HGB BLD-MCNC: 14.7 G/DL (ref 13–17.7)
MAGNESIUM SERPL-MCNC: 2.3 MG/DL (ref 1.6–2.4)
MCH RBC QN AUTO: 31.6 PG (ref 26.6–33)
MCHC RBC AUTO-ENTMCNC: 35.5 G/DL (ref 31.5–35.7)
MCV RBC AUTO: 89 FL (ref 79–97)
PHOSPHATE SERPL-MCNC: 2.7 MG/DL (ref 2.5–4.5)
PLATELET # BLD AUTO: 142 10*3/MM3 (ref 140–450)
PMV BLD AUTO: 11 FL (ref 6–12)
POTASSIUM SERPL-SCNC: 4.4 MMOL/L (ref 3.5–5.2)
PROT SERPL-MCNC: 5.8 G/DL (ref 6–8.5)
RBC # BLD AUTO: 4.65 10*6/MM3 (ref 4.14–5.8)
SODIUM SERPL-SCNC: 135 MMOL/L (ref 136–145)
WBC NRBC COR # BLD: 11.19 10*3/MM3 (ref 3.4–10.8)

## 2022-08-21 PROCEDURE — 84100 ASSAY OF PHOSPHORUS: CPT | Performed by: SURGERY

## 2022-08-21 PROCEDURE — 25010000002 HEPARIN (PORCINE) PER 1000 UNITS: Performed by: SURGERY

## 2022-08-21 PROCEDURE — 25010000002 ERTAPENEM PER 500 MG: Performed by: SURGERY

## 2022-08-21 PROCEDURE — 80053 COMPREHEN METABOLIC PANEL: CPT | Performed by: SURGERY

## 2022-08-21 PROCEDURE — 99232 SBSQ HOSP IP/OBS MODERATE 35: CPT | Performed by: INTERNAL MEDICINE

## 2022-08-21 PROCEDURE — 97165 OT EVAL LOW COMPLEX 30 MIN: CPT

## 2022-08-21 PROCEDURE — 85027 COMPLETE CBC AUTOMATED: CPT | Performed by: SURGERY

## 2022-08-21 PROCEDURE — 82962 GLUCOSE BLOOD TEST: CPT

## 2022-08-21 PROCEDURE — 83735 ASSAY OF MAGNESIUM: CPT | Performed by: SURGERY

## 2022-08-21 RX ORDER — FAMOTIDINE 20 MG/1
20 TABLET, FILM COATED ORAL DAILY
Status: DISCONTINUED | OUTPATIENT
Start: 2022-08-22 | End: 2022-08-22 | Stop reason: HOSPADM

## 2022-08-21 RX ADMIN — Medication 10 ML: at 08:00

## 2022-08-21 RX ADMIN — FAMOTIDINE 20 MG: 20 TABLET ORAL at 07:57

## 2022-08-21 RX ADMIN — HEPARIN SODIUM 5000 UNITS: 5000 INJECTION INTRAVENOUS; SUBCUTANEOUS at 05:28

## 2022-08-21 RX ADMIN — Medication 10 ML: at 20:25

## 2022-08-21 RX ADMIN — ROSUVASTATIN CALCIUM 10 MG: 10 TABLET, COATED ORAL at 08:00

## 2022-08-21 RX ADMIN — ERTAPENEM 500 MG: 1 INJECTION INTRAMUSCULAR; INTRAVENOUS at 20:25

## 2022-08-21 RX ADMIN — RIVAROXABAN 15 MG: 15 TABLET, FILM COATED ORAL at 17:00

## 2022-08-21 RX ADMIN — LEVOTHYROXINE SODIUM 175 MCG: 150 TABLET ORAL at 05:28

## 2022-08-21 RX ADMIN — SODIUM CHLORIDE, POTASSIUM CHLORIDE, SODIUM LACTATE AND CALCIUM CHLORIDE 75 ML/HR: 600; 310; 30; 20 INJECTION, SOLUTION INTRAVENOUS at 02:16

## 2022-08-21 NOTE — THERAPY EVALUATION
Patient Name: Lio Griffith  : 1935    MRN: [id number]                              Today's Date: 2022       Admit Date: 2022    Visit Dx:     ICD-10-CM ICD-9-CM   1. Sepsis without acute organ dysfunction, due to unspecified organism (HCC)  A41.9 038.9     995.91   2. Acute cholecystitis  K81.0 575.0   3. Acute renal failure, unspecified acute renal failure type (HCC)  N17.9 584.9     Patient Active Problem List   Diagnosis   • Calculus of kidney   • Essential hypertension   • Type 2 diabetes mellitus with stage 3 chronic kidney disease, without long-term current use of insulin (HCC)   • Acquired hypothyroidism   • Malignant neoplasm of thyroid gland (HCC)   • Chronic low back pain   • Chronic atrial fibrillation (HCC)   • Hypercholesterolemia   • Both eyes affected by mild nonproliferative diabetic retinopathy with macular edema, associated with type 2 diabetes mellitus (HCC)   • Stage 3 chronic kidney disease (HCC)   • History of colonic polyps   • Lymphocytic colitis   • Coronary artery disease involving native coronary artery of native heart without angina pectoris   • Hemorrhoids   • Osteoarthritis   • Peripheral neuropathy   • Class 2 severe obesity due to excess calories with serious comorbidity and body mass index (BMI) of 38.0 to 38.9 in adult (HCC)   • Obstructive sleep apnea   • Malignant melanoma of torso excluding breast (HCC)   • Diabetic nephropathy associated with type 2 diabetes mellitus (HCC)   • Calculus of gallbladder   • BRAMBILA (nonalcoholic steatohepatitis)   • Acute renal failure (ARF) (HCC)   • Sepsis, unspecified organism (HCC)   • Acute cholecystitis   • Sepsis without acute organ dysfunction, due to unspecified organism (HCC)     Past Medical History:   Diagnosis Date   • Cancer (HCC)    • Hypertension    • Hypothyroidism    • Kidney stone    • Type 2 diabetes mellitus with renal complication (HCC) 1988    A1C 5% per patient in March. Good control     Past Surgical  History:   Procedure Laterality Date   • ADENOIDECTOMY     • APPENDECTOMY     • HIP SURGERY Right 03/12/2013    Right JOYA    • JOINT REPLACEMENT     • THYROIDECTOMY     • TONSILLECTOMY     • VITRECTOMY PARS PLANA W/ REPAIR OF MACULAR HOLE        General Information     Row Name 08/21/22 0857          OT Time and Intention    Document Type evaluation  -HK     Mode of Treatment occupational therapy  -     Row Name 08/21/22 0857          General Information    Patient Profile Reviewed yes  -HK     Prior Level of Function independent:;all household mobility;community mobility;gait;transfer;bed mobility;ADL's  -HK     Existing Precautions/Restrictions fall;other (see comments)  abdominal incision; KATY drain  -HK     Barriers to Rehab medically complex;previous functional deficit  -     Row Name 08/21/22 0857          Living Environment    People in Home spouse  -     Row Name 08/21/22 0857          Home Main Entrance    Number of Stairs, Main Entrance one  -HK     Stair Railings, Main Entrance none  -HK     Row Name 08/21/22 0857          Stairs Within Home, Primary    Number of Stairs, Within Home, Primary none  -HK     Stair Railings, Within Home, Primary none  -HK     Stairs Comment, Within Home, Primary Pt reports walk in shower.  -HK     Row Name 08/21/22 0857          Cognition    Orientation Status (Cognition) oriented x 4  -HK     Row Name 08/21/22 0857          Safety Issues, Functional Mobility    Safety Issues Affecting Function (Mobility) safety precautions follow-through/compliance;safety precaution awareness  -     Impairments Affecting Function (Mobility) balance;endurance/activity tolerance;pain;strength  -     Comment, Safety Issues/Impairments (Mobility) Pt slightly agitated this AM with early OT session.  -           User Key  (r) = Recorded By, (t) = Taken By, (c) = Cosigned By    Initials Name Provider Type    HK Abeba Manzano, OT Occupational Therapist                 Mobility/ADL's      Row Name 08/21/22 0858          Bed Mobility    Bed Mobility scooting/bridging;supine-sit  -     Scooting/Bridging Wheeler (Bed Mobility) supervision;verbal cues  -     Supine-Sit Wheeler (Bed Mobility) supervision;verbal cues  -     Bed Mobility, Safety Issues impaired trunk control for bed mobility  -     Assistive Device (Bed Mobility) bed rails;head of bed elevated  -     Comment, (Bed Mobility) Pt advanced to EOB with extra time/effort. No physical assist from OT.  -     Row Name 08/21/22 0858          Transfers    Transfers sit-stand transfer  -     Sit-Stand Wheeler (Transfers) contact guard;verbal cues  -     Row Name 08/21/22 0858          Sit-Stand Transfer    Assistive Device (Sit-Stand Transfers) --  none  -[facility] Name 08/21/22 0858          Functional Mobility    Functional Mobility- Comment not tested; pt declined  -[facility] Name 08/21/22 0858          Activities of Daily Living    BADL Assessment/Intervention lower body dressing;feeding  -[facility] Name 08/21/22 0858          Lower Body Dressing Assessment/Training    Position (Lower Body Dressing) edge of bed sitting  -     Comment, (Lower Body Dressing) Pt declined to attempt stating he can complete at home. Provided education on sock aid and shoe horn and pt declined need. Will continue to assess when pt is less agitated.  -     Row Name 08/21/22 0858          Self-Feeding Assessment/Training    Wheeler Level (Feeding) minimum assist (75% patient effort);other (see comments)  -     Comment, (Feeding) Dashawn to open liquids container. Brings liquids to mouth independently.  -           User Key  (r) = Recorded By, (t) = Taken By, (c) = Cosigned By    Initials Name Provider Type     Abeba Manzano, OT Occupational Therapist               Obj/Interventions     Row Name 08/21/22 0901          Sensory Assessment (Somatosensory)    Sensory Assessment (Somatosensory) sensation intact  -     Row Name  08/21/22 0901          Vision Assessment/Intervention    Visual Impairment/Limitations corrective lenses full-time  -HK     Row Name 08/21/22 0901          Range of Motion Comprehensive    General Range of Motion no range of motion deficits identified  -HK     Comment, General Range of Motion B UE WNL  -HK     Row Name 08/21/22 0901          Strength Comprehensive (MMT)    General Manual Muscle Testing (MMT) Assessment no strength deficits identified  -HK     Comment, General Manual Muscle Testing (MMT) Assessment B UE 5/5  -HK     Row Name 08/21/22 0901          Balance    Balance Assessment sitting static balance;sitting dynamic balance;standing static balance;standing dynamic balance  -HK     Static Sitting Balance independent  -HK     Dynamic Sitting Balance contact guard  -HK     Position, Sitting Balance unsupported;sitting edge of bed  -HK     Static Standing Balance contact guard  -HK     Dynamic Standing Balance contact guard  -HK     Position/Device Used, Standing Balance unsupported  -HK           User Key  (r) = Recorded By, (t) = Taken By, (c) = Cosigned By    Initials Name Provider Type     Abeba Manzano, OT Occupational Therapist               Goals/Plan     Row Name 08/21/22 0905          Bed Mobility Goal 1 (OT)    Activity/Assistive Device (Bed Mobility Goal 1, OT) sit to supine/supine to sit;scooting  -HK     Lynn Level/Cues Needed (Bed Mobility Goal 1, OT) modified independence  -HK     Time Frame (Bed Mobility Goal 1, OT) by discharge;long term goal (LTG)  -     Progress/Outcomes (Bed Mobility Goal 1, OT) goal ongoing  -     Row Name 08/21/22 0905          Transfer Goal 1 (OT)    Activity/Assistive Device (Transfer Goal 1, OT) sit-to-stand/stand-to-sit;bed-to-chair/chair-to-bed;toilet  -HK     Lynn Level/Cues Needed (Transfer Goal 1, OT) contact guard required;verbal cues required  -HK     Time Frame (Transfer Goal 1, OT) by discharge;long term goal (LTG)  -HK      Progress/Outcome (Transfer Goal 1, OT) goal ongoing  -HK     Row Name 08/21/22 0905          Dressing Goal 1 (OT)    Activity/Device (Dressing Goal 1, OT) lower body dressing  -HK     Park/Cues Needed (Dressing Goal 1, OT) moderate assist (50-74% patient effort);verbal cues required  -HK     Time Frame (Dressing Goal 1, OT) by discharge;long term goal (LTG)  -HK     Progress/Outcome (Dressing Goal 1, OT) goal ongoing  -     Row Name 08/21/22 0905          Toileting Goal 1 (OT)    Activity/Device (Toileting Goal 1, OT) adjust/manage clothing;perform perineal hygiene  -HK     Park Level/Cues Needed (Toileting Goal 1, OT) minimum assist (75% or more patient effort);verbal cues required  -HK     Time Frame (Toileting Goal 1, OT) by discharge;long term goal (LTG)  -HK     Progress/Outcome (Toileting Goal 1, OT) goal ongoing  -HK     Row Name 08/21/22 0905          Therapy Assessment/Plan (OT)    Planned Therapy Interventions (OT) adaptive equipment training;BADL retraining;functional balance retraining;occupation/activity based interventions;transfer/mobility retraining;ROM/therapeutic exercise  -HK           User Key  (r) = Recorded By, (t) = Taken By, (c) = Cosigned By    Initials Name Provider Type    Abeba Lindsey, OT Occupational Therapist               Clinical Impression     Row Name 08/21/22 0902          Pain Assessment    Additional Documentation Pain Scale: FACES Pre/Post-Treatment (Group)  -HK     Row Name 08/21/22 0902          Pain Scale: FACES Pre/Post-Treatment    Pain: FACES Scale, Pretreatment 2-->hurts little bit  -HK     Posttreatment Pain Rating 2-->hurts little bit  -HK     Pain Location - Side/Orientation Right  -HK     Pain Location lower  -HK     Pain Location - abdomen  -HK     Row Name 08/21/22 0902          Plan of Care Review    Plan of Care Reviewed With patient  -HK     Progress improving  -HK     Outcome Evaluation OT eval complete. Pt slightly agitated this AM with  early evaluation. Advanced to EOB with supervision and extra time/effort. No physical assist from OT. Pt declined to attempt LBD stating he can complete at home. OT provided education on sock aid and shoe horn and pt declined need. Pt transfers with CGA. Declined ambulation to chair this morning. B UE strength and ROM WNL. Will follow up to fruther assess toileting and dressing when pt less agitated. Recommend d/c home.  -     Row Name 08/21/22 0902          Therapy Assessment/Plan (OT)    Patient/Family Therapy Goal Statement (OT) Pt would like to improve and return home.  -HK     Rehab Potential (OT) good, to achieve stated therapy goals  -     Criteria for Skilled Therapeutic Interventions Met (OT) yes;skilled treatment is necessary  -     Therapy Frequency (OT) daily  -     Row Name 08/21/22 0902          Therapy Plan Review/Discharge Plan (OT)    Anticipated Discharge Disposition (OT) home with assist  -     Row Name 08/21/22 0902          Vital Signs    Pre Systolic BP Rehab --  RN cleared for tx; VSS  -HK     O2 Delivery Pre Treatment room air  -HK     O2 Delivery Intra Treatment room air  -HK     O2 Delivery Post Treatment room air  -HK     Pre Patient Position Supine  -HK     Intra Patient Position Standing  -HK     Post Patient Position Supine  -HK     Row Name 08/21/22 0902          Positioning and Restraints    Pre-Treatment Position in bed  -HK     Post Treatment Position bed  -HK     In Bed notified nsg;supine;call light within reach;encouraged to call for assist;exit alarm on  -HK           User Key  (r) = Recorded By, (t) = Taken By, (c) = Cosigned By    Initials Name Provider Type    Abeba Lindsey, OT Occupational Therapist               Outcome Measures     Row Name 08/21/22 0925          How much help from another is currently needed...    Putting on and taking off regular lower body clothing? 1  -HK     Bathing (including washing, rinsing, and drying) 3  -HK     Toileting (which  includes using toilet bed pan or urinal) 3  -HK     Putting on and taking off regular upper body clothing 3  -HK     Taking care of personal grooming (such as brushing teeth) 3  -HK     Eating meals 3  -HK     AM-PAC 6 Clicks Score (OT) 16  -     Row Name 08/21/22 0925          Functional Assessment    Outcome Measure Options AM-PAC 6 Clicks Daily Activity (OT)  -           User Key  (r) = Recorded By, (t) = Taken By, (c) = Cosigned By    Initials Name Provider Type     Abeba Manzano OT Occupational Therapist                Occupational Therapy Education                 Title: PT OT SLP Therapies (In Progress)     Topic: Occupational Therapy (In Progress)     Point: ADL training (Done)     Description:   Instruct learner(s) on proper safety adaptation and remediation techniques during self care or transfers.   Instruct in proper use of assistive devices.              Learning Progress Summary           Patient Acceptance, E,TB,D, VU,NR by  at 8/21/2022 0928                   Point: Home exercise program (Not Started)     Description:   Instruct learner(s) on appropriate technique for monitoring, assisting and/or progressing therapeutic exercises/activities.              Learner Progress:  Not documented in this visit.          Point: Precautions (Done)     Description:   Instruct learner(s) on prescribed precautions during self-care and functional transfers.              Learning Progress Summary           Patient Acceptance, E,TB,D, VU,NR by  at 8/21/2022 0928                   Point: Body mechanics (Done)     Description:   Instruct learner(s) on proper positioning and spine alignment during self-care, functional mobility activities and/or exercises.              Learning Progress Summary           Patient Acceptance, E,TB,D, VU,NR by  at 8/21/2022 0928                               User Key     Initials Effective Dates Name Provider Type [facility] 06/16/21 -  Abeba Manzano OT Occupational  Therapist OT              OT Recommendation and Plan  Planned Therapy Interventions (OT): adaptive equipment training, BADL retraining, functional balance retraining, occupation/activity based interventions, transfer/mobility retraining, ROM/therapeutic exercise  Therapy Frequency (OT): daily  Plan of Care Review  Plan of Care Reviewed With: patient  Progress: improving  Outcome Evaluation: OT eval complete. Pt slightly agitated this AM with early evaluation. Advanced to EOB with supervision and extra time/effort. No physical assist from OT. Pt declined to attempt LBD stating he can complete at home. OT provided education on sock aid and shoe horn and pt declined need. Pt transfers with CGA. Declined ambulation to chair this morning. B UE strength and ROM WNL. Will follow up to fruther assess toileting and dressing when pt less agitated. Recommend d/c home.     Time Calculation:    Time Calculation- OT     Row Name 08/21/22 0830             Time Calculation- OT    OT Start Time 0830  -HK      OT Received On 08/21/22  -HK      OT Goal Re-Cert Due Date 08/31/22  -HK              Untimed Charges    OT Eval/Re-eval Minutes 56  -HK              Total Minutes    Untimed Charges Total Minutes 56  -HK       Total Minutes 56  -HK            User Key  (r) = Recorded By, (t) = Taken By, (c) = Cosigned By    Initials Name Provider Type     Abeba Manzano OT Occupational Therapist              Therapy Charges for Today     Code Description Service Date Service Provider Modifiers Qty    [id number]  OT EVAL LOW COMPLEXITY 4 8/21/2022 Abeba Manzano OT GO 1               Abeba Manzano OT  8/21/2022

## 2022-08-21 NOTE — PROGRESS NOTES
" LOS: 3 days   Patient Care Team:  Johnnie Wylie MD as PCP - General (Internal Medicine)  Dominick Hannah MD as Consulting Physician (Nephrology)  Ambar Diaz MD as Consulting Physician (Ophthalmology)    Chief Complaint: ALFONZO on CKD stage III    Subjective     Renal function improving. Underwent cholecystectomy yesterday.   Subjective:  Symptoms:  Improved.  No shortness of breath.    Diet:  Adequate intake.    Activity level: Normal.        History taken from: patient    Objective     Vital Sign Min/Max for last 24 hours  Temp  Min: 97.8 °F (36.6 °C)  Max: 98 °F (36.7 °C)   BP  Min: 131/95  Max: 140/88   Pulse  Min: 67  Max: 94   Resp  Min: 16  Max: 18   SpO2  Min: 92 %  Max: 96 %   Flow (L/min)  Min: 2  Max: 2   No data recorded     Flowsheet Rows    Flowsheet Row First Filed Value   Admission Height 175.3 cm (69\") Documented at 08/18/2022 1601   Admission Weight 100 kg (221 lb) Documented at 08/18/2022 1601          I/O this shift:  In: 240 [P.O.:240]  Out: 525 [Urine:525]  I/O last 3 completed shifts:  In: 360 [P.O.:360]  Out: 1660 [Urine:1550; Drains:110]    Objective:  General Appearance:  Comfortable.    Vital signs: (most recent): Blood pressure 133/84, pulse 80, temperature 97.9 °F (36.6 °C), temperature source Oral, resp. rate 18, height 175.3 cm (69.02\"), weight 100 kg (220 lb 7.4 oz), SpO2 96 %.  Vital signs are normal.    Output: Producing urine.    HEENT: Normal HEENT exam.    Lungs:  Normal effort and normal respiratory rate.  Breath sounds clear to auscultation.  He is not in respiratory distress.  No decreased breath sounds or wheezes.    Heart: Normal rate.  Regular rhythm.  S1 normal and S2 normal.  No murmur or gallop.   Abdomen: Abdomen is soft.  Bowel sounds are normal.     Extremities: Normal range of motion.  There is no deformity, dependent edema or local swelling.    Pulses: Distal pulses are intact.    Neurological: Patient is alert and oriented to person, place " and time.  Normal strength.    Pupils:  Pupils are equal, round, and reactive to light.              Results Review:     I reviewed the patient's new clinical results.    WBC WBC   Date Value Ref Range Status   08/21/2022 11.19 (H) 3.40 - 10.80 10*3/mm3 Final   08/19/2022 9.06 3.40 - 10.80 10*3/mm3 Final   08/18/2022 12.51 (H) 3.40 - 10.80 10*3/mm3 Final      HGB Hemoglobin   Date Value Ref Range Status   08/21/2022 14.7 13.0 - 17.7 g/dL Final   08/19/2022 11.7 (L) 13.0 - 17.7 g/dL Final   08/18/2022 14.2 13.0 - 17.7 g/dL Final      HCT Hematocrit   Date Value Ref Range Status   08/21/2022 41.4 37.5 - 51.0 % Final   08/19/2022 34.2 (L) 37.5 - 51.0 % Final   08/18/2022 41.4 37.5 - 51.0 % Final      Platlets No results found for: LABPLAT   MCV MCV   Date Value Ref Range Status   08/21/2022 89.0 79.0 - 97.0 fL Final   08/19/2022 92.4 79.0 - 97.0 fL Final   08/18/2022 92.0 79.0 - 97.0 fL Final          Sodium Sodium   Date Value Ref Range Status   08/21/2022 135 (L) 136 - 145 mmol/L Final   08/20/2022 135 (L) 136 - 145 mmol/L Final   08/19/2022 134 (L) 136 - 145 mmol/L Final   08/18/2022 131 (L) 136 - 145 mmol/L Final      Potassium Potassium   Date Value Ref Range Status   08/21/2022 4.4 3.5 - 5.2 mmol/L Final   08/20/2022 4.5 3.5 - 5.2 mmol/L Final     Comment:     Slight hemolysis detected by analyzer. Results may be affected.   08/19/2022 3.7 3.5 - 5.2 mmol/L Final   08/18/2022 4.8 3.5 - 5.2 mmol/L Final     Comment:     Specimen hemolyzed.  Results may be affected.      Chloride Chloride   Date Value Ref Range Status   08/21/2022 103 98 - 107 mmol/L Final   08/20/2022 103 98 - 107 mmol/L Final   08/19/2022 101 98 - 107 mmol/L Final   08/18/2022 94 (L) 98 - 107 mmol/L Final      CO2 CO2   Date Value Ref Range Status   08/21/2022 22.0 22.0 - 29.0 mmol/L Final   08/20/2022 21.0 (L) 22.0 - 29.0 mmol/L Final   08/19/2022 21.0 (L) 22.0 - 29.0 mmol/L Final   08/18/2022 26.0 22.0 - 29.0 mmol/L Final      BUN BUN   Date  Value Ref Range Status   08/21/2022 56 (H) 8 - 23 mg/dL Final   08/20/2022 61 (H) 8 - 23 mg/dL Final   08/19/2022 64 (H) 8 - 23 mg/dL Final   08/18/2022 65 (H) 8 - 23 mg/dL Final      Creatinine Creatinine   Date Value Ref Range Status   08/21/2022 2.18 (H) 0.76 - 1.27 mg/dL Final   08/20/2022 2.44 (H) 0.76 - 1.27 mg/dL Final   08/19/2022 3.00 (H) 0.76 - 1.27 mg/dL Final   08/18/2022 3.77 (H) 0.76 - 1.27 mg/dL Final      Calcium Calcium   Date Value Ref Range Status   08/21/2022 9.1 8.6 - 10.5 mg/dL Final   08/20/2022 8.9 8.6 - 10.5 mg/dL Final   08/19/2022 8.6 8.6 - 10.5 mg/dL Final   08/18/2022 9.1 8.6 - 10.5 mg/dL Final      PO4 No results found for: CAPO4   Albumin Albumin   Date Value Ref Range Status   08/21/2022 2.70 (L) 3.50 - 5.20 g/dL Final   08/19/2022 2.60 (L) 3.50 - 5.20 g/dL Final   08/18/2022 3.50 3.50 - 5.20 g/dL Final      Magnesium Magnesium   Date Value Ref Range Status   08/21/2022 2.3 1.6 - 2.4 mg/dL Final      Uric Acid No results found for: URICACID     Medication Review: yes    Assessment & Plan       Essential hypertension    Type 2 diabetes mellitus with stage 3 chronic kidney disease, without long-term current use of insulin (HCC)    Chronic atrial fibrillation (HCC)    Hypercholesterolemia    Stage 3 chronic kidney disease (HCC)    Coronary artery disease involving native coronary artery of native heart without angina pectoris    Diabetic nephropathy associated with type 2 diabetes mellitus (HCC)    BRAMBILA (nonalcoholic steatohepatitis)    Acute renal failure (ARF) (HCC)    Sepsis, unspecified organism (HCC)    Acute cholecystitis    Sepsis without acute organ dysfunction, due to unspecified organism (HCC)      Assessment & Plan     ALFONZO on CKD stage III:Baseline cr~ 1.7-2.0mg/dl. Cr on this admission 3.7. Suspect etiology of ALFONZO hemodynamic injury in the setting of acute cholecystitis and poor po intake     Met acidosis: Mild in the setting of ALFONZO on CKD     Hypovolemia: Due to poo po  intake     Acute cholecystitis: On abx plan for surgery today         Recs  ALFONZO on CKD in the setting of acute cholecystitis. Improvement in renal function. D/C iV fluids. Encourage po intake. F/u in renal clinic in 4 weeks after discharge Abx per primary service. Dose meds to eGFR. Avoid nephrotoxic agents.   Grupo Howard MD  08/21/22  12:17 EDT

## 2022-08-21 NOTE — PROGRESS NOTES
"General Surgery Post op Follow Up Note    Subjective:   Tolerating liquids.  Pain controlled.    /84 (BP Location: Left arm, Patient Position: Lying)   Pulse 80   Temp 97.9 °F (36.6 °C) (Oral)   Resp 18   Ht 175.3 cm (69.02\")   Wt 100 kg (220 lb 7.4 oz)   SpO2 96%   BMI 32.54 kg/m²     General Appearance:  in no acute distress  Abdomen: incisions clean and appropriate, KATY drain benign    CBC  Results from last 7 days   Lab Units 08/21/22  0442   WBC 10*3/mm3 11.19*   HEMOGLOBIN g/dL 14.7   HEMATOCRIT % 41.4   PLATELETS 10*3/mm3 142       CMP/BMP  Results from last 7 days   Lab Units 08/21/22  0442   SODIUM mmol/L 135*   POTASSIUM mmol/L 4.4   CHLORIDE mmol/L 103   CO2 mmol/L 22.0   BUN mg/dL 56*   CREATININE mg/dL 2.18*   CALCIUM mg/dL 9.1   BILIRUBIN mg/dL 0.4   ALK PHOS U/L 121*   ALT (SGPT) U/L 63*   AST (SGOT) U/L 75*   GLUCOSE mg/dL 140*         ASSESSMENT/PLAN:  POD 2 laparoscopic cholecystectomy for acute cholecystitis    Advance to a low-fat diet  Continue antibiotics, bile culture demonstrated E. Coli  Creatinine continues to improve, good urine output  He is back on his Xarelto  Mobilize    Quoc Brownlee MD  8/21/2022  12:00 EDT  " pmd

## 2022-08-21 NOTE — PLAN OF CARE
Goal Outcome Evaluation:  Patients VSS, no c/o sob, pain or discomfort. Patient Verbalized readiness for discharge tomorrow. Patient was uncooperative with PT this shift and did not want to work with them.  Patient was also unwilling to call for assistance with standing at the bedside alone, educated on fall risk and safety. Patient then more cooperative and willing to ask for assistance. No other complaints from patient at this time. Will continue to monitor.    Problem: Fall Injury Risk  Goal: Absence of Fall and Fall-Related Injury  Intervention: Promote Injury-Free Environment  Recent Flowsheet Documentation  Taken 8/21/2022 1600 by Pretty Shah RN  Safety Promotion/Fall Prevention: safety round/check completed  Taken 8/21/2022 1400 by Pretty Shah RN  Safety Promotion/Fall Prevention: safety round/check completed  Taken 8/21/2022 1200 by Pretty Shah RN  Safety Promotion/Fall Prevention: safety round/check completed  Taken 8/21/2022 1000 by Pretty Shah RN  Safety Promotion/Fall Prevention: safety round/check completed  Taken 8/21/2022 0800 by Pretty Shah RN  Safety Promotion/Fall Prevention:   safety round/check completed   fall prevention program maintained   nonskid shoes/slippers when out of bed     Problem: Diabetes Comorbidity  Goal: Blood Glucose Level Within Targeted Range  Intervention: Monitor and Manage Glycemia  Recent Flowsheet Documentation  Taken 8/21/2022 1400 by Pretty Shah RN  Glycemic Management: blood glucose monitored  Taken 8/21/2022 1200 by Pretty Shah RN  Glycemic Management: blood glucose monitored  Taken 8/21/2022 1000 by Pretty Shah RN  Glycemic Management: blood glucose monitored  Taken 8/21/2022 0800 by Pretty Shah, RN  Glycemic Management:   blood glucose monitored

## 2022-08-21 NOTE — PLAN OF CARE
Goal Outcome Evaluation:  Plan of Care Reviewed With: patient        Progress: improving  Outcome Evaluation: OT eval complete. Pt slightly agitated this AM with early evaluation. Advanced to EOB with supervision and extra time/effort. No physical assist from OT. Pt declined to attempt LBD stating he can complete at home. OT provided education on sock aid and shoe horn and pt declined need. Pt transfers with CGA. Declined ambulation to chair this morning. B UE strength and ROM WNL. Will follow up to fruther assess toileting and dressing when pt less agitated. Recommend d/c home.

## 2022-08-21 NOTE — PROGRESS NOTES
[facility] Medicine Services  PROGRESS NOTE    Patient Name: Lio Griffith  : 1935  MRN: [id number]    Date of Admission: 2022  Primary Care Physician: Johnnie Wylie MD    Subjective   Subjective     CC: f/u CCY    HPI: Up in bed eating. Had a good night. Pain controlled. Walked well with PT yesterday.     ROS:  Gen- No fevers, chills  CV- No chest pain, palpitations  Resp- No cough, dyspnea  GI- No N/V/D, abd pain     Objective   Objective     Vital Signs:   Temp:  [97.8 °F (36.6 °C)-98 °F (36.7 °C)] 97.8 °F (36.6 °C)  Heart Rate:  [67-94] 77  Resp:  [16-18] 18  BP: (131-140)/(76-95) 131/95  Flow (L/min):  [2] 2     Physical Exam:  Constitutional: No acute distress, awake, alert  HENT: NCAT, mucous membranes moist  Respiratory: Clear to auscultation bilaterally, respiratory effort normal   Cardiovascular: RRR, no murmurs, rubs, or gallops  Gastrointestinal: Positive bowel sounds, soft, nontender, nondistended, incisions c/d/i, galindo.  Musculoskeletal: No bilateral ankle edema  Psychiatric: Appropriate affect, cooperative  Neurologic: Oriented x 3, strength symmetric in all extremities, Cranial Nerves grossly intact to confrontation, speech clear  Skin: No rashes    Results Reviewed:  LAB RESULTS:      Lab 22  0442 22  0316 22  1752 08/15/22  1256   WBC 11.19* 9.06 12.51* 10.76   HEMOGLOBIN 14.7 11.7* 14.2 15.2   HEMATOCRIT 41.4 34.2* 41.4 45.0   PLATELETS 142 94* 126* 171   NEUTROS ABS  --  7.90* 10.84* 9.30*   IMMATURE GRANS (ABS)  --  0.03 0.03 0.04   LYMPHS ABS  --  0.21* 0.35* 0.43*   MONOS ABS  --  0.76 1.10* 0.86   EOS ABS  --  0.14 0.15 0.09   MCV 89.0 92.4 92.0 93.9   PROCALCITONIN  --   --  9.94*  --    LACTATE  --   --  1.4  --          Lab 22  0442 22  1504 22  0637 22  1752 08/15/22  1256   SODIUM 135* 135* 134* 131* 134*   POTASSIUM 4.4 4.5 3.7 4.8 4.0   CHLORIDE 103 103 101 94* 93*   CO2 22.0 21.0* 21.0* 26.0  24.0   ANION GAP 10.0 11.0 12.0 11.0 17.0*   BUN 56* 61* 64* 65* 31*   CREATININE 2.18* 2.44* 3.00* 3.77* 1.63*   EGFR 29.1* 25.4* 19.9* 15.1* 41.3*   GLUCOSE 140* 179* 124* 112* 136*   CALCIUM 9.1 8.9 8.6 9.1 9.8   MAGNESIUM 2.3  --   --   --   --    PHOSPHORUS 2.7  --   --   --   --          Lab 08/21/22  0442 08/19/22  0637 08/18/22  1752 08/15/22  1256   TOTAL PROTEIN 5.8* 5.7* 5.9* 6.9   ALBUMIN 2.70* 2.60* 3.50 4.40   GLOBULIN 3.1 3.1 2.4 2.5   ALT (SGPT) 63* 40 39 17   AST (SGOT) 75* 74* 84* 23   BILIRUBIN 0.4 0.7 0.9 1.0   ALK PHOS 121* 139* 107 101   AMYLASE  --   --   --  51   LIPASE  --   --  18 21         Lab 08/15/22  1256   TROPONIN T <0.010             Lab 08/19/22  1057   ABO TYPING O   RH TYPING Positive   ANTIBODY SCREEN Negative         Brief Urine Lab Results  (Last result in the past 365 days)      Color   Clarity   Blood   Leuk Est   Nitrite   Protein   CREAT   Urine HCG        08/19/22 0212 Yellow   Cloudy   Trace   Moderate (2+)   Negative   30 mg/dL (1+)                 Microbiology Results Abnormal     Procedure Component Value - Date/Time    Blood Culture - Blood, Arm, Left [[id number]]  (Normal) Collected: 08/18/22 1830    Lab Status: Preliminary result Specimen: Blood from Arm, Left Updated: 08/20/22 2000     Blood Culture No growth at 2 days    Narrative:      Pediatric bottle only      Blood Culture - Blood, Wrist, Right [[id number]]  (Normal) Collected: 08/18/22 1905    Lab Status: Preliminary result Specimen: Blood from Wrist, Right Updated: 08/20/22 2000     Blood Culture No growth at 2 days    COVID PRE-OP / PRE-PROCEDURE SCREENING ORDER (NO ISOLATION) - Swab, Nasopharynx [[id number]]  (Normal) Collected: 08/18/22 1753    Lab Status: Final result Specimen: Swab from Nasopharynx Updated: 08/18/22 8286    Narrative:      The following orders were created for panel order COVID PRE-OP / PRE-PROCEDURE SCREENING ORDER (NO ISOLATION) - Swab, Nasopharynx.  Procedure                                Abnormality         Status                     ---------                               -----------         ------                     COVID-19 and FLU A/B PCR...[[id number]]  Normal              Final result                 Please view results for these tests on the individual orders.    COVID-19 and FLU A/B PCR - Swab, Nasopharynx [[id number]]  (Normal) Collected: 08/18/22 1753    Lab Status: Final result Specimen: Swab from Nasopharynx Updated: 08/18/22 1836     COVID19 Not Detected     Influenza A PCR Not Detected     Influenza B PCR Not Detected    Narrative:      Fact sheet for providers: https://www.fda.gov/media/076909/download    Fact sheet for patients: https://www.fda.gov/media/280032/download    Test performed by PCR.          No radiology results from the last 24 hrs    Results for orders placed during the hospital encounter of 06/16/21    Adult Transthoracic Echo Complete W/ Cont if Necessary Per Protocol    Interpretation Summary  · Estimated left ventricular EF = 69% Left ventricular systolic function is normal.  · Left ventricular wall thickness is consistent with mild concentric hypertrophy.  · Estimated right ventricular systolic pressure from tricuspid regurgitation is moderately elevated (45-55 mmHg).  · The right atrial cavity is mild to moderately dilated.  · Left atrial volume is mildly increased.  · The right ventricular cavity is mildly dilated.      I have reviewed the medications:  Scheduled Meds:ertapenem, 500 mg, Intravenous, Nightly  [START ON 8/22/2022] famotidine, 20 mg, Oral, Daily  insulin lispro, 0-7 Units, Subcutaneous, TID AC  levothyroxine, 175 mcg, Oral, Q AM  rivaroxaban, 15 mg, Oral, Daily With Dinner  rosuvastatin, 10 mg, Oral, Daily  sodium chloride, 10 mL, Intravenous, Q12H      Continuous Infusions:   PRN Meds:.•  acetaminophen  •  dextrose  •  dextrose  •  glucagon (human recombinant)  •  HYDROcodone-acetaminophen  •  HYDROmorphone **AND** naloxone  •  ondansetron  •   ondansetron  •  Sodium Chloride (PF)  •  [COMPLETED] Insert peripheral IV **AND** sodium chloride  •  sodium chloride    Assessment & Plan   Assessment & Plan     Active Hospital Problems    Diagnosis  POA   • Acute renal failure (ARF) (HCC) [N17.9]  Yes   • Sepsis, unspecified organism (HCC) [A41.9]  Yes   • Acute cholecystitis [K81.0]  Yes   • Sepsis without acute organ dysfunction, due to unspecified organism (HCC) [A41.9]  Yes   • BRAMBILA (nonalcoholic steatohepatitis) [K75.81]  Yes   • Diabetic nephropathy associated with type 2 diabetes mellitus (HCC) [E11.21]  Yes   • Coronary artery disease involving native coronary artery of native heart without angina pectoris [I25.10]  Yes   • Stage 3 chronic kidney disease (HCC) [N18.30]  Yes   • Hypercholesterolemia [E78.00]  Yes   • Essential hypertension [I10]  Yes   • Chronic atrial fibrillation (HCC) [I48.20]  Yes   • Type 2 diabetes mellitus with stage 3 chronic kidney disease, without long-term current use of insulin (HCC) [E11.22, N18.30]  Yes      Resolved Hospital Problems   No resolved problems to display.        Brief Hospital Course to date:  Lio Griffith is a 86 y.o. male with PMHx significant for BRAMBILA cirrhosis, HTN, CKDIII (1.7-1.8), PAF on Xarelto, DMII, CAD, HLD who presents to [facility] with complaints of RUQ abdominal pain.     Acute Cholecystitis  - CT scan consistent with acute cholecystitis with markedly thickened and edematous gallbladder wall, 3.5 cm gallstone, and surrounding inflammatory change  - Discussed briefly with Dr. Brownlee. S/P CCY.  - Resume Xarelto today.  - PRN anti-emetics, PRN pain control  - Stop IVF  - Continue to ambulate.     Acute Renal Failure on CKD IIIb  - His baseline appears to be around 1.7-1.8. Suspect worsening due to volume depletion assoc w/ above. Continues to improve. Stop IVF and monitor.  - Nephrology following.     PAF:  - Resume Xarelto today.  - Rate controlled.     BRAMBILA Cirrhosis:  TCP  - followed by   Erick  - stable     DMII:  - SSI for now     HTN:  - hold anti-hypertensives as he is borderline hypotensive in the ED     HLD:  - continue statin     CAD:  - medically managed     This patient's problems and plans were partially entered by my partner and updated as appropriate by me 08/21/22.     Expected Discharge Location and Transportation: home  Expected Discharge Date: 8/22.    DVT prophylaxis:  Medical and mechanical DVT prophylaxis orders are present.     AM-PAC 6 Clicks Score (PT): 19 (08/20/22 2018)    CODE STATUS:   Code Status and Medical Interventions:   Ordered at: 08/18/22 1954     Level Of Support Discussed With:    Patient     Code Status (Patient has no pulse and is not breathing):    CPR (Attempt to Resuscitate)     Medical Interventions (Patient has pulse or is breathing):    Full Support       Rosanna Roberson II, DO  08/21/22

## 2022-08-22 ENCOUNTER — APPOINTMENT (OUTPATIENT)
Dept: ULTRASOUND IMAGING | Facility: HOSPITAL | Age: 87
End: 2022-08-22

## 2022-08-22 ENCOUNTER — READMISSION MANAGEMENT (OUTPATIENT)
Dept: CALL CENTER | Facility: HOSPITAL | Age: 87
End: 2022-08-22

## 2022-08-22 VITALS
TEMPERATURE: 97.8 F | WEIGHT: 220.46 LBS | RESPIRATION RATE: 18 BRPM | DIASTOLIC BLOOD PRESSURE: 83 MMHG | SYSTOLIC BLOOD PRESSURE: 126 MMHG | HEART RATE: 87 BPM | HEIGHT: 69 IN | BODY MASS INDEX: 32.65 KG/M2 | OXYGEN SATURATION: 96 %

## 2022-08-22 PROBLEM — N17.9 ACUTE RENAL FAILURE (ARF) (HCC): Status: RESOLVED | Noted: 2022-08-18 | Resolved: 2022-08-22

## 2022-08-22 PROBLEM — K81.0 ACUTE CHOLECYSTITIS: Status: RESOLVED | Noted: 2022-08-18 | Resolved: 2022-08-22

## 2022-08-22 PROBLEM — A41.9 SEPSIS, UNSPECIFIED ORGANISM: Status: RESOLVED | Noted: 2022-08-18 | Resolved: 2022-08-22

## 2022-08-22 PROBLEM — A41.9 SEPSIS WITHOUT ACUTE ORGAN DYSFUNCTION, DUE TO UNSPECIFIED ORGANISM (HCC): Status: RESOLVED | Noted: 2022-08-18 | Resolved: 2022-08-22

## 2022-08-22 LAB
ANION GAP SERPL CALCULATED.3IONS-SCNC: 9 MMOL/L (ref 5–15)
BUN SERPL-MCNC: 51 MG/DL (ref 8–23)
BUN/CREAT SERPL: 27.6 (ref 7–25)
CALCIUM SPEC-SCNC: 9.3 MG/DL (ref 8.6–10.5)
CHLORIDE SERPL-SCNC: 103 MMOL/L (ref 98–107)
CO2 SERPL-SCNC: 26 MMOL/L (ref 22–29)
CREAT SERPL-MCNC: 1.85 MG/DL (ref 0.76–1.27)
EGFRCR SERPLBLD CKD-EPI 2021: 35.5 ML/MIN/1.73
GLUCOSE BLDC GLUCOMTR-MCNC: 126 MG/DL (ref 70–130)
GLUCOSE SERPL-MCNC: 138 MG/DL (ref 65–99)
POTASSIUM SERPL-SCNC: 4.2 MMOL/L (ref 3.5–5.2)
SODIUM SERPL-SCNC: 138 MMOL/L (ref 136–145)

## 2022-08-22 PROCEDURE — 99239 HOSP IP/OBS DSCHRG MGMT >30: CPT | Performed by: INTERNAL MEDICINE

## 2022-08-22 PROCEDURE — 82962 GLUCOSE BLOOD TEST: CPT

## 2022-08-22 PROCEDURE — 25010000002 METHYLNALTREXONE 12 MG/0.6ML SOLUTION: Performed by: SURGERY

## 2022-08-22 PROCEDURE — 80048 BASIC METABOLIC PNL TOTAL CA: CPT | Performed by: INTERNAL MEDICINE

## 2022-08-22 RX ORDER — QUINAPRIL 40 MG/1
40 TABLET ORAL DAILY
Qty: 90 TABLET | Refills: 1
Start: 2022-08-24

## 2022-08-22 RX ORDER — METRONIDAZOLE 500 MG/1
500 TABLET ORAL EVERY 8 HOURS SCHEDULED
Qty: 17 TABLET | Refills: 0 | Status: SHIPPED | OUTPATIENT
Start: 2022-08-22 | End: 2022-08-28

## 2022-08-22 RX ORDER — ONDANSETRON 4 MG/1
4 TABLET, FILM COATED ORAL EVERY 8 HOURS PRN
Qty: 30 TABLET | Refills: 0 | Status: SHIPPED | OUTPATIENT
Start: 2022-08-22

## 2022-08-22 RX ORDER — METRONIDAZOLE 500 MG/1
500 TABLET ORAL EVERY 8 HOURS SCHEDULED
Status: DISCONTINUED | OUTPATIENT
Start: 2022-08-22 | End: 2022-08-22 | Stop reason: HOSPADM

## 2022-08-22 RX ORDER — CEFDINIR 300 MG/1
300 CAPSULE ORAL EVERY 12 HOURS SCHEDULED
Qty: 12 CAPSULE | Refills: 0 | Status: SHIPPED | OUTPATIENT
Start: 2022-08-22 | End: 2022-08-28

## 2022-08-22 RX ORDER — CEFDINIR 300 MG/1
300 CAPSULE ORAL EVERY 12 HOURS SCHEDULED
Status: DISCONTINUED | OUTPATIENT
Start: 2022-08-22 | End: 2022-08-22 | Stop reason: HOSPADM

## 2022-08-22 RX ORDER — AMLODIPINE BESYLATE 5 MG/1
5 TABLET ORAL DAILY
Start: 2022-08-22

## 2022-08-22 RX ORDER — ATENOLOL 100 MG/1
100 TABLET ORAL DAILY
Start: 2022-08-24

## 2022-08-22 RX ORDER — HYDROCODONE BITARTRATE AND ACETAMINOPHEN 5; 325 MG/1; MG/1
1 TABLET ORAL EVERY 6 HOURS PRN
Qty: 18 TABLET | Refills: 0 | Status: SHIPPED | OUTPATIENT
Start: 2022-08-22 | End: 2022-08-25

## 2022-08-22 RX ADMIN — LEVOTHYROXINE SODIUM 175 MCG: 150 TABLET ORAL at 05:24

## 2022-08-22 RX ADMIN — FAMOTIDINE 20 MG: 20 TABLET ORAL at 09:03

## 2022-08-22 RX ADMIN — ROSUVASTATIN CALCIUM 10 MG: 10 TABLET, COATED ORAL at 09:03

## 2022-08-22 RX ADMIN — Medication 10 ML: at 09:04

## 2022-08-22 RX ADMIN — METHYLNALTREXONE BROMIDE 6 MG: 12 INJECTION, SOLUTION SUBCUTANEOUS at 10:26

## 2022-08-22 NOTE — PLAN OF CARE
No acute events overnight. Patient currently resting in bed.  Problem: Fall Injury Risk  Goal: Absence of Fall and Fall-Related Injury  Outcome: Ongoing, Progressing  Intervention: Promote Injury-Free Environment  Recent Flowsheet Documentation  Taken 8/22/2022 0435 by Phuc Loo RN  Safety Promotion/Fall Prevention:   activity supervised   assistive device/personal items within reach   clutter free environment maintained   fall prevention program maintained   nonskid shoes/slippers when out of bed   safety round/check completed   room organization consistent  Taken 8/22/2022 0142 by Phuc Loo RN  Safety Promotion/Fall Prevention:   activity supervised   assistive device/personal items within reach   clutter free environment maintained   fall prevention program maintained   nonskid shoes/slippers when out of bed   safety round/check completed   room organization consistent  Taken 8/22/2022 0055 by Phuc Loo RN  Safety Promotion/Fall Prevention:   activity supervised   assistive device/personal items within reach   clutter free environment maintained   fall prevention program maintained   nonskid shoes/slippers when out of bed   room organization consistent   safety round/check completed  Taken 8/21/2022 2022 by Phuc Loo, RN  Safety Promotion/Fall Prevention:   activity supervised   clutter free environment maintained   assistive device/personal items within reach   fall prevention program maintained   nonskid shoes/slippers when out of bed   safety round/check completed   room organization consistent     Problem: Adult Inpatient Plan of Care  Goal: Plan of Care Review  Outcome: Ongoing, Progressing  Goal: Patient-Specific Goal (Individualized)  Outcome: Ongoing, Progressing  Goal: Absence of Hospital-Acquired Illness or Injury  Outcome: Ongoing, Progressing  Intervention: Identify and Manage Fall Risk  Recent Flowsheet Documentation  Taken 8/22/2022 0435 by Phuc Loo  RN  Safety Promotion/Fall Prevention:   activity supervised   assistive device/personal items within reach   clutter free environment maintained   fall prevention program maintained   nonskid shoes/slippers when out of bed   safety round/check completed   room organization consistent  Taken 8/22/2022 0142 by Phuc Loo RN  Safety Promotion/Fall Prevention:   activity supervised   assistive device/personal items within reach   clutter free environment maintained   fall prevention program maintained   nonskid shoes/slippers when out of bed   safety round/check completed   room organization consistent  Taken 8/22/2022 0055 by Phuc Loo RN  Safety Promotion/Fall Prevention:   activity supervised   assistive device/personal items within reach   clutter free environment maintained   fall prevention program maintained   nonskid shoes/slippers when out of bed   room organization consistent   safety round/check completed  Taken 8/21/2022 2022 by Phuc Loo RN  Safety Promotion/Fall Prevention:   activity supervised   clutter free environment maintained   assistive device/personal items within reach   fall prevention program maintained   nonskid shoes/slippers when out of bed   safety round/check completed   room organization consistent  Intervention: Prevent Skin Injury  Recent Flowsheet Documentation  Taken 8/22/2022 0435 by Phuc Loo RN  Skin Protection:   adhesive use limited   incontinence pads utilized  Taken 8/22/2022 0142 by Phuc Loo RN  Skin Protection:   adhesive use limited   incontinence pads utilized  Taken 8/22/2022 0055 by Phuc Loo RN  Skin Protection:   adhesive use limited   incontinence pads utilized  Taken 8/21/2022 2022 by Phuc Loo RN  Skin Protection:   adhesive use limited   incontinence pads utilized  Intervention: Prevent and Manage VTE (Venous Thromboembolism) Risk  Recent Flowsheet Documentation  Taken 8/22/2022 0435 by Eze  JUSTIN Friend  Activity Management: activity adjusted per tolerance  Taken 8/22/2022 0142 by Phuc Loo RN  Activity Management: activity adjusted per tolerance  Taken 8/22/2022 0055 by Phuc Loo RN  Activity Management: activity adjusted per tolerance  Taken 8/21/2022 2022 by Phuc Loo RN  Activity Management: activity adjusted per tolerance  VTE Prevention/Management:   bilateral   sequential compression devices off  Intervention: Prevent Infection  Recent Flowsheet Documentation  Taken 8/22/2022 0435 by Phuc Loo RN  Infection Prevention: single patient room provided  Taken 8/22/2022 0142 by Phuc Loo RN  Infection Prevention: single patient room provided  Taken 8/22/2022 0055 by Phuc Loo RN  Infection Prevention: single patient room provided  Taken 8/21/2022 2022 by Phuc Loo RN  Infection Prevention: single patient room provided  Goal: Optimal Comfort and Wellbeing  Outcome: Ongoing, Progressing  Intervention: Provide Person-Centered Care  Recent Flowsheet Documentation  Taken 8/21/2022 2022 by Phuc Loo RN  Trust Relationship/Rapport:   care explained   choices provided   emotional support provided   questions answered   questions encouraged   reassurance provided   thoughts/feelings acknowledged   empathic listening provided  Goal: Readiness for Transition of Care  Outcome: Ongoing, Progressing     Problem: COPD (Chronic Obstructive Pulmonary Disease) Comorbidity  Goal: Maintenance of COPD Symptom Control  Outcome: Ongoing, Progressing  Intervention: Maintain COPD-Symptom Control  Recent Flowsheet Documentation  Taken 8/21/2022 2022 by Phuc Loo RN  Supportive Measures: active listening utilized     Problem: Diabetes Comorbidity  Goal: Blood Glucose Level Within Targeted Range  Outcome: Ongoing, Progressing   Goal Outcome Evaluation:

## 2022-08-22 NOTE — CASE MANAGEMENT/SOCIAL WORK
Continued Stay Note  [facility]     Patient Name: Lio Griffith  MRN: [id number]  Today's Date: 8/22/2022    Admit Date: 8/18/2022     Discharge Plan     Row Name 08/22/22 1136       Plan    Plan HOME    Patient/Family in Agreement with Plan yes    Plan Comments Met with pt and wife at bedside to f/u DCP.  Plan is to return home with wife upon DC.  Pt declined HH services as previously rec per PT.  No other needs identified/voiced.    Final Discharge Disposition Code 01 - home or self-care               Discharge Codes    No documentation.               Expected Discharge Date and Time     Expected Discharge Date Expected Discharge Time    Aug 22, 2022             Ryanne Bradley RN

## 2022-08-22 NOTE — PROGRESS NOTES
" LOS: 4 days   Patient Care Team:  Johnnie Wylie MD as PCP - General (Internal Medicine)  Dominick Hannah MD as Consulting Physician (Nephrology)  Ambar Diaz MD as Consulting Physician (Ophthalmology)    Chief Complaint: ALFONZO on CKD stage III    Subjective     Renal function improving. Underwent cholecystectomy 2 days ago. Doing well otherwise.   Subjective:  Symptoms:  Improved.  No shortness of breath.    Diet:  Adequate intake.    Activity level: Normal.        History taken from: patient    Objective     Vital Sign Min/Max for last 24 hours  Temp  Min: 97.8 °F (36.6 °C)  Max: 98.2 °F (36.8 °C)   BP  Min: 126/83  Max: 150/88   Pulse  Min: 75  Max: 94   Resp  Min: 16  Max: 18   SpO2  Min: 90 %  Max: 96 %   No data recorded   No data recorded     Flowsheet Rows    Flowsheet Row First Filed Value   Admission Height 175.3 cm (69\") Documented at 08/18/2022 1601   Admission Weight 100 kg (221 lb) Documented at 08/18/2022 1601          I/O this shift:  In: 360 [P.O.:360]  Out: 575 [Urine:575]  I/O last 3 completed shifts:  In: 840 [P.O.:840]  Out: 4225 [Urine:4075; Drains:150]    Objective:  General Appearance:  Comfortable.    Vital signs: (most recent): Blood pressure 126/83, pulse 87, temperature 97.8 °F (36.6 °C), temperature source Oral, resp. rate 18, height 175.3 cm (69.02\"), weight 100 kg (220 lb 7.4 oz), SpO2 96 %.  Vital signs are normal.    Output: Producing urine.    HEENT: Normal HEENT exam.    Lungs:  Normal effort and normal respiratory rate.  Breath sounds clear to auscultation.  He is not in respiratory distress.  No decreased breath sounds or wheezes.    Heart: Normal rate.  Regular rhythm.  S1 normal and S2 normal.  No murmur or gallop.   Abdomen: Abdomen is soft.  Bowel sounds are normal.     Extremities: Normal range of motion.  There is no deformity, dependent edema or local swelling.    Pulses: Distal pulses are intact.    Neurological: Patient is alert and oriented to " person, place and time.  Normal strength.    Pupils:  Pupils are equal, round, and reactive to light.              Results Review:     I reviewed the patient's new clinical results.    WBC WBC   Date Value Ref Range Status   08/21/2022 11.19 (H) 3.40 - 10.80 10*3/mm3 Final      HGB Hemoglobin   Date Value Ref Range Status   08/21/2022 14.7 13.0 - 17.7 g/dL Final      HCT Hematocrit   Date Value Ref Range Status   08/21/2022 41.4 37.5 - 51.0 % Final      Platlets No results found for: LABPLAT   MCV MCV   Date Value Ref Range Status   08/21/2022 89.0 79.0 - 97.0 fL Final          Sodium Sodium   Date Value Ref Range Status   08/22/2022 138 136 - 145 mmol/L Final   08/21/2022 135 (L) 136 - 145 mmol/L Final   08/20/2022 135 (L) 136 - 145 mmol/L Final      Potassium Potassium   Date Value Ref Range Status   08/22/2022 4.2 3.5 - 5.2 mmol/L Final     Comment:     Slight hemolysis detected by analyzer. Results may be affected.   08/21/2022 4.4 3.5 - 5.2 mmol/L Final   08/20/2022 4.5 3.5 - 5.2 mmol/L Final     Comment:     Slight hemolysis detected by analyzer. Results may be affected.      Chloride Chloride   Date Value Ref Range Status   08/22/2022 103 98 - 107 mmol/L Final   08/21/2022 103 98 - 107 mmol/L Final   08/20/2022 103 98 - 107 mmol/L Final      CO2 CO2   Date Value Ref Range Status   08/22/2022 26.0 22.0 - 29.0 mmol/L Final   08/21/2022 22.0 22.0 - 29.0 mmol/L Final   08/20/2022 21.0 (L) 22.0 - 29.0 mmol/L Final      BUN BUN   Date Value Ref Range Status   08/22/2022 51 (H) 8 - 23 mg/dL Final   08/21/2022 56 (H) 8 - 23 mg/dL Final   08/20/2022 61 (H) 8 - 23 mg/dL Final      Creatinine Creatinine   Date Value Ref Range Status   08/22/2022 1.85 (H) 0.76 - 1.27 mg/dL Final   08/21/2022 2.18 (H) 0.76 - 1.27 mg/dL Final   08/20/2022 2.44 (H) 0.76 - 1.27 mg/dL Final      Calcium Calcium   Date Value Ref Range Status   08/22/2022 9.3 8.6 - 10.5 mg/dL Final   08/21/2022 9.1 8.6 - 10.5 mg/dL Final   08/20/2022 8.9 8.6 -  10.5 mg/dL Final      PO4 No results found for: CAPO4   Albumin Albumin   Date Value Ref Range Status   08/21/2022 2.70 (L) 3.50 - 5.20 g/dL Final      Magnesium Magnesium   Date Value Ref Range Status   08/21/2022 2.3 1.6 - 2.4 mg/dL Final      Uric Acid No results found for: URICACID     Medication Review: yes    Assessment & Plan       Essential hypertension    Type 2 diabetes mellitus with stage 3 chronic kidney disease, without long-term current use of insulin (HCC)    Chronic atrial fibrillation (HCC)    Hypercholesterolemia    Stage 3 chronic kidney disease (HCC)    Coronary artery disease involving native coronary artery of native heart without angina pectoris    Diabetic nephropathy associated with type 2 diabetes mellitus (HCC)    BRAMBILA (nonalcoholic steatohepatitis)      Assessment & Plan     ALFONZO on CKD stage III:Baseline cr~ 1.7-2.0mg/dl. Cr on this admission 3.7. Suspect etiology of ALFONZO hemodynamic injury in the setting of acute cholecystitis and poor po intake     Met acidosis: Mild in the setting of ALFONZO on CKD     Hypovolemia: Due to poo po intake     Acute cholecystitis: On abx plan for surgery today         Recs  ALFONZO on CKD in the setting of acute cholecystitis. Improvement in renal function. D/C iV fluids. Encourage po intake. F/u in renal clinic in 4 weeks after discharge Abx per primary service. Dose meds to eGFR. Avoid nephrotoxic agents.   rGupo Howard MD  08/22/22  12:16 EDT

## 2022-08-22 NOTE — DISCHARGE SUMMARY
[facility] Medicine Services  DISCHARGE SUMMARY    Patient Name: Lio Griffith  : 1935  MRN: [id number]    Date of Admission: 2022  5:54 PM  Date of Discharge:  2022  Primary Care Physician: Johnnie Wylie MD    Consults     Date and Time Order Name Status Description    2022  9:44 PM Inpatient Nephrology Consult      2022  9:44 PM Inpatient General Surgery Consult Completed           Hospital Course     Presenting Problem:   Sepsis without acute organ dysfunction, due to unspecified organism (HCC) [A41.9]    Active Hospital Problems    Diagnosis  POA   • BRAMBILA (nonalcoholic steatohepatitis) [K75.81]  Yes   • Diabetic nephropathy associated with type 2 diabetes mellitus (HCC) [E11.21]  Yes   • Coronary artery disease involving native coronary artery of native heart without angina pectoris [I25.10]  Yes   • Stage 3 chronic kidney disease (HCC) [N18.30]  Yes   • Hypercholesterolemia [E78.00]  Yes   • Essential hypertension [I10]  Yes   • Chronic atrial fibrillation (HCC) [I48.20]  Yes   • Type 2 diabetes mellitus with stage 3 chronic kidney disease, without long-term current use of insulin (HCC) [E11.22, N18.30]  Yes      Resolved Hospital Problems    Diagnosis Date Resolved POA   • Acute renal failure (ARF) (HCC) [N17.9] 2022 Yes   • Sepsis, unspecified organism (HCC) [A41.9] 2022 Yes   • Acute cholecystitis [K81.0] 2022 Yes   • Sepsis without acute organ dysfunction, due to unspecified organism (HCC) [A41.9] 2022 Yes          Hospital Course:  Lio Griffith is a 86 y.o. male with PMHx significant for BRAMBILA cirrhosis, HTN, CKDIII (1.7-1.8), PAF on Xarelto, DMII, CAD, HLD who presents to [facility] with complaints of RUQ abdominal pain.     Acute Cholecystitis  - CT scan consistent with acute cholecystitis with markedly thickened and edematous gallbladder wall, 3.5 cm gallstone, and surrounding inflammatory change  - Patient followed by  Dr. Brownlee throughout stay. He is S/P CCY w/ uneventful post op course. His IOP cultures from bile grew pan sensitive E coli. He was initially treated with IV invanz and will continue PO ceftin/flagyl for 6 more days at d/c. F/U Dr. Brownlee in 1 month.     Acute Renal Failure on CKD IIIb  - Creatinine elevated to 3.7 on arrival from baseline which appeared to be around 1.7-1.8. Suspect worsening due to volume depletion assoc w/ above. His antihypertensives were held and he was hydrated with IVF and creatinine returned to baseline.    Discharge Follow Up Recommendations for outpatient labs/diagnostics:   PCP in 1 week w/ BMP    Dr. Brownlee in 1 month    Day of Discharge     HPI: Sitting up in chair. Had a good night. Pain controlled. Eating well. Wants to go home.     Review of Systems  Gen- No fevers, chills  CV- No chest pain, palpitations  Resp- No cough, dyspnea  GI- No N/V/D, abd pain    Vital Signs:   Temp:  [97.8 °F (36.6 °C)-98.2 °F (36.8 °C)] 97.8 °F (36.6 °C)  Heart Rate:  [75-94] 85  Resp:  [16-18] 18  BP: (126-150)/(83-97) 126/83      Physical Exam:  Constitutional: No acute distress, awake, alert  HENT: NCAT, mucous membranes moist  Respiratory: Clear to auscultation bilaterally, respiratory effort normal   Cardiovascular: RRR, no murmurs, rubs, or gallops  Gastrointestinal: Positive bowel sounds, soft, nontender, nondistended, obese abd, well healing lap sites  Musculoskeletal: No bilateral ankle edema  Psychiatric: Appropriate affect, cooperative  Neurologic: Oriented x 3, strength symmetric in all extremities, Cranial Nerves grossly intact to confrontation, speech clear  Skin: No rashes    Pertinent  and/or Most Recent Results     LAB RESULTS:      Lab 08/21/22  0442 08/19/22  0316 08/18/22  1752 08/15/22  1256   WBC 11.19* 9.06 12.51* 10.76   HEMOGLOBIN 14.7 11.7* 14.2 15.2   HEMATOCRIT 41.4 34.2* 41.4 45.0   PLATELETS 142 94* 126* 171   NEUTROS ABS  --  7.90* 10.84* 9.30*   IMMATURE GRANS (ABS)  --   0.03 0.03 0.04   LYMPHS ABS  --  0.21* 0.35* 0.43*   MONOS ABS  --  0.76 1.10* 0.86   EOS ABS  --  0.14 0.15 0.09   MCV 89.0 92.4 92.0 93.9   PROCALCITONIN  --   --  9.94*  --    LACTATE  --   --  1.4  --          Lab 08/22/22  0905 08/21/22  0442 08/20/22  1504 08/19/22  0637 08/18/22  1752   SODIUM 138 135* 135* 134* 131*   POTASSIUM 4.2 4.4 4.5 3.7 4.8   CHLORIDE 103 103 103 101 94*   CO2 26.0 22.0 21.0* 21.0* 26.0   ANION GAP 9.0 10.0 11.0 12.0 11.0   BUN 51* 56* 61* 64* 65*   CREATININE 1.85* 2.18* 2.44* 3.00* 3.77*   EGFR 35.5* 29.1* 25.4* 19.9* 15.1*   GLUCOSE 138* 140* 179* 124* 112*   CALCIUM 9.3 9.1 8.9 8.6 9.1   MAGNESIUM  --  2.3  --   --   --    PHOSPHORUS  --  2.7  --   --   --          Lab 08/21/22 0442 08/19/22  0637 08/18/22  1752 08/15/22  1256   TOTAL PROTEIN 5.8* 5.7* 5.9* 6.9   ALBUMIN 2.70* 2.60* 3.50 4.40   GLOBULIN 3.1 3.1 2.4 2.5   ALT (SGPT) 63* 40 39 17   AST (SGOT) 75* 74* 84* 23   BILIRUBIN 0.4 0.7 0.9 1.0   ALK PHOS 121* 139* 107 101   AMYLASE  --   --   --  51   LIPASE  --   --  18 21         Lab 08/15/22  1256   TROPONIN T <0.010             Lab 08/19/22  1057   ABO TYPING O   RH TYPING Positive   ANTIBODY SCREEN Negative         Brief Urine Lab Results  (Last result in the past 365 days)      Color   Clarity   Blood   Leuk Est   Nitrite   Protein   CREAT   Urine HCG        08/19/22 0212 Yellow   Cloudy   Trace   Moderate (2+)   Negative   30 mg/dL (1+)               Microbiology Results (last 10 days)     Procedure Component Value - Date/Time    Body Fluid Culture - Bile, Gallbladder [[id number]]  (Abnormal)  (Susceptibility) Collected: 08/19/22 1541    Lab Status: Final result Specimen: Bile from Gallbladder Updated: 08/21/22 0720     Body Fluid Culture Scant growth (1+) Escherichia coli     Gram Stain No WBCs or organisms seen    Susceptibility      Escherichia coli      ALBERT      Ampicillin Susceptible      Ampicillin + Sulbactam Susceptible      Cefepime Susceptible       Ceftazidime Susceptible      Ceftriaxone Susceptible      Gentamicin Susceptible      Levofloxacin Susceptible      Piperacillin + Tazobactam Susceptible      Trimethoprim + Sulfamethoxazole Susceptible                       Susceptibility Comments     Escherichia coli    Cefazolin sensitivity will not be reported for Enterobacteriaceae in non-urine isolates. If cefazolin is preferred, please call the microbiology lab to request an E-test.  With the exception of urinary-sourced infections, aminoglycosides should not be used as monotherapy.             Blood Culture - Blood, Wrist, Right [[id number]]  (Normal) Collected: 08/18/22 1905    Lab Status: Preliminary result Specimen: Blood from Wrist, Right Updated: 08/21/22 2001     Blood Culture No growth at 3 days    Blood Culture - Blood, Arm, Left [[id number]]  (Normal) Collected: 08/18/22 1830    Lab Status: Preliminary result Specimen: Blood from Arm, Left Updated: 08/21/22 2001     Blood Culture No growth at 3 days    Narrative:      Pediatric bottle only      COVID PRE-OP / PRE-PROCEDURE SCREENING ORDER (NO ISOLATION) - Swab, Nasopharynx [[id number]]  (Normal) Collected: 08/18/22 1753    Lab Status: Final result Specimen: Swab from Nasopharynx Updated: 08/18/22 1836    Narrative:      The following orders were created for panel order COVID PRE-OP / PRE-PROCEDURE SCREENING ORDER (NO ISOLATION) - Swab, Nasopharynx.  Procedure                               Abnormality         Status                     ---------                               -----------         ------                     COVID-19 and FLU A/B PCR...[[id number]]  Normal              Final result                 Please view results for these tests on the individual orders.    COVID-19 and FLU A/B PCR - Swab, Nasopharynx [[id number]]  (Normal) Collected: 08/18/22 1753    Lab Status: Final result Specimen: Swab from Nasopharynx Updated: 08/18/22 1836     COVID19 Not Detected     Influenza A PCR Not Detected      Influenza B PCR Not Detected    Narrative:      Fact sheet for providers: https://www.fda.gov/media/839854/download    Fact sheet for patients: https://www.fda.gov/media/322369/download    Test performed by PCR.          CT Abdomen With Contrast    Result Date: 8/18/2022  DATE OF EXAM: 8/18/2022 2:51 PM  PROCEDURE: CT ABDOMEN W CONTRAST-  INDICATIONS: R10.9; R10.9-Unspecified abdominal pain  COMPARISON: 3/15/2016  TECHNIQUE: Routine transaxial slices were obtained through the abdomen after the intravenous administration of 60 mL of Isovue 300. Reconstructed coronal and sagittal images were also obtained. Automated exposure control and iterative construction methods were used.  The radiation dose reduction device was turned on for each scan per the ALARA (As Low as Reasonably Achievable) protocol.  FINDINGS: The gallbladder is markedly inflamed, thick-walled and edematous, with average gallbladder wall thickness of almost 2 cm. A large calcified gallstone is again seen, as on prior study, measuring up to 3.5 cm in diameter. There is moderately extensive surrounding inflammatory fat stranding, and mild secondary inflammation of the adjacent second portion of the duodenum. There is no evidence of bowel obstruction. Oral contrast passes normally through the stomach, duodenum and into the jejunum.  Elsewhere at the level of this scan, the included lower lungs appear clear. There is diffuse fatty liver change. Multiple bilateral renal cysts are again noted as well as a couple of nonobstructing left lower pole renal calculi up to 3 or 4 mm in maximal diameter. Spleen and adrenal glands appear within normal limits.  Pancreas is relatively atrophic, as is often seen in a patient this age, however, today's study now shows isolated marked dilatation of pancreatic duct confined to the tip of the pancreatic tail, up to approximately 8 mm in diameter as visible on images 38 through 46. There are associated calcifications and  findings could reflect sequelae of previous pancreatitis. An underlying pancreatic mass cannot be entirely excluded and there is a focal dense area of parenchyma at the level of potential ductal obstruction where an underlying isodense nodule up to 14 mm in direct be present, although no well-defined mass is seen. Please refer to axial image 47 of series 2. No active inflammation is seen here.  No free air ascites, adenopathy or other acute inflammatory focus is seen. Delayed venous phase images show no evidence of obstructive uropathy. Bony structures appear to be intact. There is advanced multilevel lumbar degenerative disc disease.             1. Findings consistent with acute cholecystitis with markedly thickened and edematous gallbladder wall, 3.5 cm gallstone, and surrounding inflammatory change. No evidence of intra or extrahepatic biliary ductal dilatation. 2. Atrophic appearance of the pancreatic tail, associated marked ductal dilatation of the pancreatic tail only, and associated pancreatic parenchymal calcifications. These findings could reflect sequelae of previous pancreatitis, possibly ductal obstruction due to small occult neoplasm of the junction of the body and tail the pancreas, although no well-defined pancreatic lesion is seen. Stricture from prior pancreatitis might cause this appearance. Similar changes are present on the patient's multiple prior unenhanced exams, but are more visible today as that this is the only contrast enhanced scan of the abdomen. Chronic disease is favored recent ductal obstruction. 3. Multiple renal cysts and small nonobstructing left lower pole renal calculi. No evidence of obstructive uropathy.    Note: Phone findings were called to Dr. Inessa bishop at approximately 3:15 PM 8/18/2022. They will contact the patient with further instructions, while he remains at the outpatient imaging Center.  This report was finalized on 8/18/2022 3:22 PM by Dr. Parish Granados MD.         Results for orders placed during the hospital encounter of 04/03/19    Doppler Arterial Multi Level Lower Extremity - Bilateral CAR    Interpretation Summary  · Right Conclusion: The right REHANA is normal. Normal digital pressures.  · Left Conclusion: The left REHANA is normal. Normal digital pressures.      Results for orders placed during the hospital encounter of 04/03/19    Doppler Arterial Multi Level Lower Extremity - Bilateral CAR    Interpretation Summary  · Right Conclusion: The right REHANA is normal. Normal digital pressures.  · Left Conclusion: The left REHANA is normal. Normal digital pressures.      Results for orders placed during the hospital encounter of 06/16/21    Adult Transthoracic Echo Complete W/ Cont if Necessary Per Protocol    Interpretation Summary  · Estimated left ventricular EF = 69% Left ventricular systolic function is normal.  · Left ventricular wall thickness is consistent with mild concentric hypertrophy.  · Estimated right ventricular systolic pressure from tricuspid regurgitation is moderately elevated (45-55 mmHg).  · The right atrial cavity is mild to moderately dilated.  · Left atrial volume is mildly increased.  · The right ventricular cavity is mildly dilated.      Plan for Follow-up of Pending Labs/Results: hospital med, surgery  Pending Labs     Order Current Status    Tissue Pathology Exam In process    Blood Culture - Blood, Arm, Left Preliminary result    Blood Culture - Blood, Wrist, Right Preliminary result        Discharge Details        Discharge Medications      New Medications      Instructions Start Date   cefdinir 300 MG capsule  Commonly known as: OMNICEF   300 mg, Oral, Every 12 Hours Scheduled      HYDROcodone-acetaminophen 5-325 MG per tablet  Commonly known as: NORCO   1 tablet, Oral, Every 6 Hours PRN      metroNIDAZOLE 500 MG tablet  Commonly known as: FLAGYL   500 mg, Oral, Every 8 Hours Scheduled      ondansetron 4 MG tablet  Commonly known as: ZOFRAN   4  mg, Oral, Every 8 Hours PRN         Changes to Medications      Instructions Start Date   atenolol 100 MG tablet  Commonly known as: TENORMIN  What changed: These instructions start on August 24, 2022. If you are unsure what to do until then, ask your doctor or other care provider.   100 mg, Oral, Daily   Start Date: August 24, 2022     quinapril 40 MG tablet  Commonly known as: ACCUPRIL  What changed: These instructions start on August 24, 2022. If you are unsure what to do until then, ask your doctor or other care provider.   40 mg, Oral, Daily   Start Date: August 24, 2022        Continue These Medications      Instructions Start Date   allopurinol 300 MG tablet  Commonly known as: ZYLOPRIM   TAKE ONE TABLET BY MOUTH DAILY      amLODIPine 5 MG tablet  Commonly known as: NORVASC   5 mg, Oral, Daily      cholecalciferol 25 MCG (1000 UT) tablet  Commonly known as: VITAMIN D3   2,000 Units, Oral, Daily      fluticasone 50 MCG/ACT nasal spray  Commonly known as: FLONASE   2 sprays, Nasal, Daily      glucose blood test strip   Monitor glucose once daily before breakfast.      glucose monitor monitoring kit   1 each, Does not apply, Every Morning Before Breakfast      levothyroxine 175 MCG tablet  Commonly known as: SYNTHROID, LEVOTHROID   175 mcg, Oral, Daily      magnesium oxide 400 MG tablet  Commonly known as: MAG-OX   400 mg, Oral, Daily      montelukast 10 MG tablet  Commonly known as: Singulair   10 mg, Oral, Nightly      multivitamin with minerals tablet tablet   Oral      OneTouch Delica Lancets 33G misc   Use as directed once daily.      rosuvastatin 20 MG tablet  Commonly known as: CRESTOR   20 mg, Oral, Daily      tolterodine 2 MG tablet  Commonly known as: DETROL   2 mg, Oral, 2 Times Daily      Xarelto 15 MG tablet  Generic drug: rivaroxaban   TAKE ONE TABLET BY MOUTH DAILY WITH DINNER         Stop These Medications    cephalexin 500 MG capsule  Commonly known as: KEFLEX     lovastatin 20 MG  tablet  Commonly known as: MEVACOR            Allergies   Allergen Reactions   • Dicloxacillin Rash         Discharge Disposition:  Home or Self Care    Diet:  Hospital:  Diet Order   Procedures   • Diet Regular; Thin; Consistent Carbohydrate, Low Fat, GI Soft       CODE STATUS:    Code Status and Medical Interventions:   Ordered at: 08/18/22 1954     Level Of Support Discussed With:    Patient     Code Status (Patient has no pulse and is not breathing):    CPR (Attempt to Resuscitate)     Medical Interventions (Patient has pulse or is breathing):    Full Support       No future appointments.    Additional Instructions for the Follow-ups that You Need to Schedule     Discharge Follow-up with PCP   As directed       Currently Documented PCP:    Johnnie Wylie MD    PCP Phone Number:    [phone]     Follow Up Details: 1 week hospital follow up         Discharge Follow-up with Specified Provider: Dr. Brownlee; 1 Month   As directed      To: Dr. Brownlee    Follow Up: 1 Month                     Rosanna Roberson II, DO  08/22/22      Time Spent on Discharge:  I spent  33  minutes on this discharge activity which included: face-to-face encounter with the patient, reviewing the data in the system, coordination of the care with the nursing staff as well as consultants, documentation, and entering orders.

## 2022-08-22 NOTE — PROGRESS NOTES
"General Surgery Post op Follow Up Note    Subjective:   Up sitting in the chair, eating.  No new complaints.    /97 (BP Location: Left arm, Patient Position: Lying)   Pulse 84   Temp 98.2 °F (36.8 °C) (Oral)   Resp 16   Ht 175.3 cm (69.02\")   Wt 100 kg (220 lb 7.4 oz)   SpO2 90%   BMI 32.54 kg/m²     General Appearance:  in no acute distress  Abdomen: incision is clean and dry, KATY benign    CBC  Results from last 7 days   Lab Units 08/21/22  0442   WBC 10*3/mm3 11.19*   HEMOGLOBIN g/dL 14.7   HEMATOCRIT % 41.4   PLATELETS 10*3/mm3 142       CMP/BMP  Results from last 7 days   Lab Units 08/21/22  0442   SODIUM mmol/L 135*   POTASSIUM mmol/L 4.4   CHLORIDE mmol/L 103   CO2 mmol/L 22.0   BUN mg/dL 56*   CREATININE mg/dL 2.18*   CALCIUM mg/dL 9.1   BILIRUBIN mg/dL 0.4   ALK PHOS U/L 121*   ALT (SGPT) U/L 63*   AST (SGOT) U/L 75*   GLUCOSE mg/dL 140*         ASSESSMENT/PLAN:    POD 3 laparoscopic cholecystectomy for acute cholecystitis    Seems to be making good clinical progress.  Tolerating a low-fat diet.  Having GI function.  Remove the KATY drain today.  From an antibiotic standpoint I would recommend a 10-day course covering E. coli (pansensitive) which grew out of his bile.    Okay with discharge from a surgical perspective.    You may not drive within 24 hour of receiving narcotic pain medication.  You may return to work/school in 2 weeks. Follow restrictions.  Restrictions of no excessive twisting/bending/lifting greater than 15 lbs for 6 weeks.  You may shower as needed starting 8/23/2022.  No tub baths, do not submerge the incision underwater in a tub bath etc.      Follow-up at [facility] in 1 month, [phone].    Quoc Brownlee MD  8/22/2022  08:22 EDT  "

## 2022-08-23 LAB
BACTERIA SPEC AEROBE CULT: NORMAL
BACTERIA SPEC AEROBE CULT: NORMAL
CYTO UR: NORMAL
LAB AP CASE REPORT: NORMAL
LAB AP CLINICAL INFORMATION: NORMAL
PATH REPORT.FINAL DX SPEC: NORMAL
PATH REPORT.GROSS SPEC: NORMAL

## 2022-08-23 NOTE — OUTREACH NOTE
Prep Survey    Flowsheet Row Responses   [facility] patient discharged from? [place]   Is LACE score < 7 ? No   Emergency Room discharge w/ pulse ox? No   Eligibility Readm Mgmt   Discharge diagnosis Sepsis-cholecystectomy   Does the patient have one of the following disease processes/diagnoses(primary or secondary)? General Surgery   Does the patient have Home health ordered? No   Is there a DME ordered? No   Prep survey completed? Yes          MARIAELENA PAUL - Registered Nurse

## 2022-08-25 ENCOUNTER — READMISSION MANAGEMENT (OUTPATIENT)
Dept: CALL CENTER | Facility: HOSPITAL | Age: 87
End: 2022-08-25

## 2022-08-25 NOTE — OUTREACH NOTE
General Surgery Week 1 Survey    Flowsheet Row Responses   [facility] patient discharged from? [place]   Does the patient have one of the following disease processes/diagnoses(primary or secondary)? General Surgery   Week 1 attempt successful? Yes   Call start time 1303   Call end time 1305   Discharge diagnosis Sepsis-cholecystectomy   Meds reviewed with patient/caregiver? Yes   Is the patient having any side effects they believe may be caused by any medication additions or changes? No   Does the patient have all medications related to this admission filled (includes all antibiotics, pain medications, etc.) Yes   Is the patient taking all medications as directed (includes completed medication regime)? Yes   Does the patient have a follow up appointment scheduled with their surgeon? Yes   Has the patient kept scheduled appointments due by today? N/A   Comments Surgery follow up 8/30/22   Has home health visited the patient within 72 hours of discharge? N/A   Psychosocial issues? No   Did the patient receive a copy of their discharge instructions? Yes   Nursing interventions Reviewed instructions with patient   What is the patient's perception of their health status since discharge? Improving   Nursing interventions Nurse provided patient education   Is the patient /caregiver able to teach back basic post-op care? Keep incision areas clean,dry and protected, No tub bath, swimming, or hot tub until instructed by MD, Continue use of incentive spirometry at least 1 week post discharge   Is the patient/caregiver able to teach back signs and symptoms of incisional infection? Increased redness, swelling or pain at the incisonal site, Increased drainage or bleeding, Incisional warmth, Pus or odor from incision, Fever   Is the patient/caregiver able to teach back steps to recovery at home? Set small, achievable goals for return to baseline health, Rest and rebuild strength, gradually increase activity, Eat a  well-balance diet   If the patient is a current smoker, are they able to teach back resources for cessation? Not a smoker   Is the patient/caregiver able to teach back the hierarchy of who to call/visit for symptoms/problems? PCP, Specialist, Home health nurse, Urgent Care, ED, 911 Yes   Week 1 call completed? Yes          KALANI DESAI - Registered Nurse

## 2022-09-01 ENCOUNTER — READMISSION MANAGEMENT (OUTPATIENT)
Dept: CALL CENTER | Facility: HOSPITAL | Age: 87
End: 2022-09-01

## 2022-09-01 NOTE — OUTREACH NOTE
General Surgery Week 2 Survey    Flowsheet Row Responses   [facility] patient discharged from? [place]   Does the patient have one of the following disease processes/diagnoses(primary or secondary)? General Surgery   Week 2 attempt successful? Yes   Call end time 1059   Meds reviewed with patient/caregiver? Yes   Is the patient having any side effects they believe may be caused by any medication additions or changes? No   Does the patient have all medications related to this admission filled (includes all antibiotics, pain medications, etc.) Yes   Is the patient taking all medications as directed (includes completed medication regime)? Yes   Does the patient have a follow up appointment scheduled with their surgeon? Yes   Has the patient kept scheduled appointments due by today? Yes   What is the patient's perception of their health status since discharge? Improving   Is the patient/caregiver able to teach back the hierarchy of who to call/visit for symptoms/problems? PCP, Specialist, Home health nurse, Urgent Care, ED, 911 Yes   Week 2 call completed? Yes   Wrap up additional comments States is doing well. Denies any needs.           KATE MALIN - Registered Nurse

## 2022-09-06 ENCOUNTER — TRANSCRIBE ORDERS (OUTPATIENT)
Dept: LAB | Facility: HOSPITAL | Age: 87
End: 2022-09-06

## 2022-09-06 ENCOUNTER — LAB (OUTPATIENT)
Dept: LAB | Facility: HOSPITAL | Age: 87
End: 2022-09-06

## 2022-09-06 DIAGNOSIS — N18.32 CHRONIC KIDNEY DISEASE (CKD) STAGE G3B/A1, MODERATELY DECREASED GLOMERULAR FILTRATION RATE (GFR) BETWEEN 30-44 ML/MIN/1.73 SQUARE METER AND ALBUMINURIA CREATININE RATIO LESS THAN 30 MG/G (CMS/H*: ICD-10-CM

## 2022-09-06 DIAGNOSIS — N18.32 CHRONIC KIDNEY DISEASE (CKD) STAGE G3B/A1, MODERATELY DECREASED GLOMERULAR FILTRATION RATE (GFR) BETWEEN 30-44 ML/MIN/1.73 SQUARE METER AND ALBUMINURIA CREATININE RATIO LESS THAN 30 MG/G (CMS/H*: Primary | ICD-10-CM

## 2022-09-06 LAB
ALBUMIN SERPL-MCNC: 3.7 G/DL (ref 3.5–5.2)
ANION GAP SERPL CALCULATED.3IONS-SCNC: 10 MMOL/L (ref 5–15)
BUN SERPL-MCNC: 34 MG/DL (ref 8–23)
BUN/CREAT SERPL: 16.5 (ref 7–25)
CALCIUM SPEC-SCNC: 9.2 MG/DL (ref 8.6–10.5)
CHLORIDE SERPL-SCNC: 101 MMOL/L (ref 98–107)
CO2 SERPL-SCNC: 25 MMOL/L (ref 22–29)
CREAT SERPL-MCNC: 2.06 MG/DL (ref 0.76–1.27)
EGFRCR SERPLBLD CKD-EPI 2021: 31.2 ML/MIN/1.73
GLUCOSE SERPL-MCNC: 114 MG/DL (ref 65–99)
PHOSPHATE SERPL-MCNC: 3.1 MG/DL (ref 2.5–4.5)
POTASSIUM SERPL-SCNC: 4.2 MMOL/L (ref 3.5–5.2)
SODIUM SERPL-SCNC: 136 MMOL/L (ref 136–145)

## 2022-09-06 PROCEDURE — 80069 RENAL FUNCTION PANEL: CPT

## 2022-09-06 PROCEDURE — 81001 URINALYSIS AUTO W/SCOPE: CPT | Performed by: STUDENT IN AN ORGANIZED HEALTH CARE EDUCATION/TRAINING PROGRAM

## 2022-09-06 PROCEDURE — 82570 ASSAY OF URINE CREATININE: CPT

## 2022-09-06 PROCEDURE — 36415 COLL VENOUS BLD VENIPUNCTURE: CPT

## 2022-09-06 PROCEDURE — 84156 ASSAY OF PROTEIN URINE: CPT

## 2022-09-07 LAB
BACTERIA UR QL AUTO: ABNORMAL /HPF
BILIRUB UR QL STRIP: NEGATIVE
CLARITY UR: CLEAR
COLOR UR: YELLOW
CREAT UR-MCNC: 49.7 MG/DL
GLUCOSE UR STRIP-MCNC: NEGATIVE MG/DL
HGB UR QL STRIP.AUTO: ABNORMAL
HYALINE CASTS UR QL AUTO: ABNORMAL /LPF
KETONES UR QL STRIP: NEGATIVE
LEUKOCYTE ESTERASE UR QL STRIP.AUTO: ABNORMAL
NITRITE UR QL STRIP: NEGATIVE
PH UR STRIP.AUTO: 6 [PH] (ref 5–8)
PROT ?TM UR-MCNC: 22.3 MG/DL
PROT UR QL STRIP: ABNORMAL
RBC # UR STRIP: ABNORMAL /HPF
REF LAB TEST METHOD: ABNORMAL
SP GR UR STRIP: 1.01 (ref 1–1.03)
SQUAMOUS #/AREA URNS HPF: ABNORMAL /HPF
UROBILINOGEN UR QL STRIP: ABNORMAL
WBC # UR STRIP: ABNORMAL /HPF

## 2022-10-13 ENCOUNTER — TRANSCRIBE ORDERS (OUTPATIENT)
Dept: ADMINISTRATIVE | Facility: HOSPITAL | Age: 87
End: 2022-10-13

## 2022-10-13 DIAGNOSIS — I87.2 VENOUS INSUFFICIENCY OF LEFT LEG: ICD-10-CM

## 2022-10-13 DIAGNOSIS — I87.2 VENOUS INSUFFICIENCY (CHRONIC) (PERIPHERAL): Primary | ICD-10-CM

## 2022-10-14 ENCOUNTER — TRANSCRIBE ORDERS (OUTPATIENT)
Dept: ADMINISTRATIVE | Facility: HOSPITAL | Age: 87
End: 2022-10-14

## 2022-10-14 DIAGNOSIS — R60.0 LOCALIZED EDEMA: Primary | ICD-10-CM

## 2022-10-31 ENCOUNTER — HOSPITAL ENCOUNTER (OUTPATIENT)
Dept: CARDIOLOGY | Facility: HOSPITAL | Age: 87
Discharge: HOME | End: 2022-10-31
Admitting: INTERNAL MEDICINE

## 2022-10-31 VITALS — HEIGHT: 69 IN | WEIGHT: 220.46 LBS | BODY MASS INDEX: 32.65 KG/M2

## 2022-10-31 DIAGNOSIS — R60.0 LOCALIZED EDEMA: ICD-10-CM

## 2022-10-31 LAB

## 2022-10-31 PROCEDURE — 93971 EXTREMITY STUDY: CPT

## 2022-10-31 PROCEDURE — 93971 EXTREMITY STUDY: CPT | Performed by: INTERNAL MEDICINE

## 2022-12-08 ENCOUNTER — LAB (OUTPATIENT)
Dept: LAB | Facility: HOSPITAL | Age: 87
End: 2022-12-08

## 2022-12-08 ENCOUNTER — TRANSCRIBE ORDERS (OUTPATIENT)
Dept: LAB | Facility: HOSPITAL | Age: 87
End: 2022-12-08

## 2022-12-08 DIAGNOSIS — N18.32 CHRONIC KIDNEY DISEASE (CKD) STAGE G3B/A1, MODERATELY DECREASED GLOMERULAR FILTRATION RATE (GFR) BETWEEN 30-44 ML/MIN/1.73 SQUARE METER AND ALBUMINURIA CREATININE RATIO LESS THAN 30 MG/G (CMS/H*: ICD-10-CM

## 2022-12-08 DIAGNOSIS — N18.32 CHRONIC KIDNEY DISEASE (CKD) STAGE G3B/A1, MODERATELY DECREASED GLOMERULAR FILTRATION RATE (GFR) BETWEEN 30-44 ML/MIN/1.73 SQUARE METER AND ALBUMINURIA CREATININE RATIO LESS THAN 30 MG/G (CMS/H*: Primary | ICD-10-CM

## 2022-12-08 LAB
ALBUMIN SERPL-MCNC: 4.1 G/DL (ref 3.5–5.2)
ANION GAP SERPL CALCULATED.3IONS-SCNC: 9.9 MMOL/L (ref 5–15)
BACTERIA UR QL AUTO: ABNORMAL /HPF
BASOPHILS # BLD AUTO: 0.07 10*3/MM3 (ref 0–0.2)
BASOPHILS NFR BLD AUTO: 1.1 % (ref 0–1.5)
BILIRUB UR QL STRIP: NEGATIVE
BUN SERPL-MCNC: 40 MG/DL (ref 8–23)
BUN/CREAT SERPL: 23.3 (ref 7–25)
CALCIUM SPEC-SCNC: 9.7 MG/DL (ref 8.6–10.5)
CHLORIDE SERPL-SCNC: 101 MMOL/L (ref 98–107)
CLARITY UR: CLEAR
CO2 SERPL-SCNC: 27.1 MMOL/L (ref 22–29)
COLOR UR: YELLOW
CREAT SERPL-MCNC: 1.72 MG/DL (ref 0.76–1.27)
DEPRECATED RDW RBC AUTO: 46.3 FL (ref 37–54)
EGFRCR SERPLBLD CKD-EPI 2021: 38.5 ML/MIN/1.73
EOSINOPHIL # BLD AUTO: 0.21 10*3/MM3 (ref 0–0.4)
EOSINOPHIL NFR BLD AUTO: 3.2 % (ref 0.3–6.2)
ERYTHROCYTE [DISTWIDTH] IN BLOOD BY AUTOMATED COUNT: 13.3 % (ref 12.3–15.4)
GLUCOSE SERPL-MCNC: 108 MG/DL (ref 65–99)
GLUCOSE UR STRIP-MCNC: NEGATIVE MG/DL
HCT VFR BLD AUTO: 41.4 % (ref 37.5–51)
HGB BLD-MCNC: 13.6 G/DL (ref 13–17.7)
HGB UR QL STRIP.AUTO: NEGATIVE
HYALINE CASTS UR QL AUTO: ABNORMAL /LPF
IMM GRANULOCYTES # BLD AUTO: 0.04 10*3/MM3 (ref 0–0.05)
IMM GRANULOCYTES NFR BLD AUTO: 0.6 % (ref 0–0.5)
KETONES UR QL STRIP: NEGATIVE
LEUKOCYTE ESTERASE UR QL STRIP.AUTO: ABNORMAL
LYMPHOCYTES # BLD AUTO: 1.08 10*3/MM3 (ref 0.7–3.1)
LYMPHOCYTES NFR BLD AUTO: 16.5 % (ref 19.6–45.3)
MCH RBC QN AUTO: 31.7 PG (ref 26.6–33)
MCHC RBC AUTO-ENTMCNC: 32.9 G/DL (ref 31.5–35.7)
MCV RBC AUTO: 96.5 FL (ref 79–97)
MONOCYTES # BLD AUTO: 0.57 10*3/MM3 (ref 0.1–0.9)
MONOCYTES NFR BLD AUTO: 8.7 % (ref 5–12)
NEUTROPHILS NFR BLD AUTO: 4.59 10*3/MM3 (ref 1.7–7)
NEUTROPHILS NFR BLD AUTO: 69.9 % (ref 42.7–76)
NITRITE UR QL STRIP: NEGATIVE
NRBC BLD AUTO-RTO: 0 /100 WBC (ref 0–0.2)
PH UR STRIP.AUTO: <=5 [PH] (ref 5–8)
PHOSPHATE SERPL-MCNC: 3.1 MG/DL (ref 2.5–4.5)
PLATELET # BLD AUTO: 229 10*3/MM3 (ref 140–450)
PMV BLD AUTO: 10.2 FL (ref 6–12)
POTASSIUM SERPL-SCNC: 4.8 MMOL/L (ref 3.5–5.2)
PROT UR QL STRIP: ABNORMAL
RBC # BLD AUTO: 4.29 10*6/MM3 (ref 4.14–5.8)
RBC # UR STRIP: ABNORMAL /HPF
REF LAB TEST METHOD: ABNORMAL
SODIUM SERPL-SCNC: 138 MMOL/L (ref 136–145)
SP GR UR STRIP: 1.02 (ref 1–1.03)
SQUAMOUS #/AREA URNS HPF: ABNORMAL /HPF
UROBILINOGEN UR QL STRIP: ABNORMAL
WBC # UR STRIP: ABNORMAL /HPF
WBC NRBC COR # BLD: 6.56 10*3/MM3 (ref 3.4–10.8)

## 2022-12-08 PROCEDURE — 85025 COMPLETE CBC W/AUTO DIFF WBC: CPT

## 2022-12-08 PROCEDURE — 81001 URINALYSIS AUTO W/SCOPE: CPT

## 2022-12-08 PROCEDURE — 82570 ASSAY OF URINE CREATININE: CPT

## 2022-12-08 PROCEDURE — 84156 ASSAY OF PROTEIN URINE: CPT

## 2022-12-08 PROCEDURE — 80069 RENAL FUNCTION PANEL: CPT

## 2022-12-08 PROCEDURE — 36415 COLL VENOUS BLD VENIPUNCTURE: CPT

## 2022-12-09 LAB
CREAT UR-MCNC: 92.4 MG/DL
PROT ?TM UR-MCNC: 76.1 MG/DL
PROT/CREAT UR: 823.6 MG/G CREA (ref 0–200)

## 2023-06-06 ENCOUNTER — TRANSCRIBE ORDERS (OUTPATIENT)
Dept: LAB | Facility: HOSPITAL | Age: 88
End: 2023-06-06
Payer: MEDICARE

## 2023-06-06 ENCOUNTER — LAB (OUTPATIENT)
Dept: LAB | Facility: HOSPITAL | Age: 88
End: 2023-06-06
Payer: MEDICARE

## 2023-06-06 DIAGNOSIS — N18.32 CHRONIC KIDNEY DISEASE (CKD) STAGE G3B/A1, MODERATELY DECREASED GLOMERULAR FILTRATION RATE (GFR) BETWEEN 30-44 ML/MIN/1.73 SQUARE METER AND ALBUMINURIA CREATININE RATIO LESS THAN 30 MG/G (CMS/H*: ICD-10-CM

## 2023-06-06 DIAGNOSIS — N18.32 CHRONIC KIDNEY DISEASE (CKD) STAGE G3B/A1, MODERATELY DECREASED GLOMERULAR FILTRATION RATE (GFR) BETWEEN 30-44 ML/MIN/1.73 SQUARE METER AND ALBUMINURIA CREATININE RATIO LESS THAN 30 MG/G (CMS/H*: Primary | ICD-10-CM

## 2023-06-06 LAB
ALBUMIN SERPL-MCNC: 4.7 G/DL (ref 3.5–5.2)
ANION GAP SERPL CALCULATED.3IONS-SCNC: 11 MMOL/L (ref 5–15)
BASOPHILS # BLD AUTO: 0.07 10*3/MM3 (ref 0–0.2)
BASOPHILS NFR BLD AUTO: 1.1 % (ref 0–1.5)
BUN SERPL-MCNC: 30 MG/DL (ref 8–23)
BUN/CREAT SERPL: 17.6 (ref 7–25)
CALCIUM SPEC-SCNC: 9.9 MG/DL (ref 8.6–10.5)
CHLORIDE SERPL-SCNC: 103 MMOL/L (ref 98–107)
CO2 SERPL-SCNC: 26 MMOL/L (ref 22–29)
CREAT SERPL-MCNC: 1.7 MG/DL (ref 0.76–1.27)
DEPRECATED RDW RBC AUTO: 50.5 FL (ref 37–54)
EGFRCR SERPLBLD CKD-EPI 2021: 39 ML/MIN/1.73
EOSINOPHIL # BLD AUTO: 0.21 10*3/MM3 (ref 0–0.4)
EOSINOPHIL NFR BLD AUTO: 3.2 % (ref 0.3–6.2)
ERYTHROCYTE [DISTWIDTH] IN BLOOD BY AUTOMATED COUNT: 14.3 % (ref 12.3–15.4)
GLUCOSE SERPL-MCNC: 109 MG/DL (ref 65–99)
HCT VFR BLD AUTO: 43.1 % (ref 37.5–51)
HGB BLD-MCNC: 14 G/DL (ref 13–17.7)
IMM GRANULOCYTES # BLD AUTO: 0.02 10*3/MM3 (ref 0–0.05)
IMM GRANULOCYTES NFR BLD AUTO: 0.3 % (ref 0–0.5)
LYMPHOCYTES # BLD AUTO: 1.05 10*3/MM3 (ref 0.7–3.1)
LYMPHOCYTES NFR BLD AUTO: 16.1 % (ref 19.6–45.3)
MCH RBC QN AUTO: 31.4 PG (ref 26.6–33)
MCHC RBC AUTO-ENTMCNC: 32.5 G/DL (ref 31.5–35.7)
MCV RBC AUTO: 96.6 FL (ref 79–97)
MONOCYTES # BLD AUTO: 0.53 10*3/MM3 (ref 0.1–0.9)
MONOCYTES NFR BLD AUTO: 8.1 % (ref 5–12)
NEUTROPHILS NFR BLD AUTO: 4.66 10*3/MM3 (ref 1.7–7)
NEUTROPHILS NFR BLD AUTO: 71.2 % (ref 42.7–76)
NRBC BLD AUTO-RTO: 0 /100 WBC (ref 0–0.2)
PHOSPHATE SERPL-MCNC: 3.3 MG/DL (ref 2.5–4.5)
PLATELET # BLD AUTO: 184 10*3/MM3 (ref 140–450)
PMV BLD AUTO: 11.1 FL (ref 6–12)
POTASSIUM SERPL-SCNC: 4.5 MMOL/L (ref 3.5–5.2)
RBC # BLD AUTO: 4.46 10*6/MM3 (ref 4.14–5.8)
SODIUM SERPL-SCNC: 140 MMOL/L (ref 136–145)
WBC NRBC COR # BLD: 6.54 10*3/MM3 (ref 3.4–10.8)

## 2023-06-06 PROCEDURE — 80069 RENAL FUNCTION PANEL: CPT

## 2023-06-06 PROCEDURE — 85025 COMPLETE CBC W/AUTO DIFF WBC: CPT

## 2023-06-06 PROCEDURE — 36415 COLL VENOUS BLD VENIPUNCTURE: CPT

## 2023-06-07 ENCOUNTER — LAB (OUTPATIENT)
Dept: LAB | Facility: HOSPITAL | Age: 88
End: 2023-06-07
Payer: MEDICARE

## 2023-06-07 DIAGNOSIS — N18.32 CHRONIC KIDNEY DISEASE (CKD) STAGE G3B/A1, MODERATELY DECREASED GLOMERULAR FILTRATION RATE (GFR) BETWEEN 30-44 ML/MIN/1.73 SQUARE METER AND ALBUMINURIA CREATININE RATIO LESS THAN 30 MG/G (CMS/H*: ICD-10-CM

## 2023-06-07 LAB
BACTERIA UR QL AUTO: ABNORMAL /HPF
BILIRUB UR QL STRIP: NEGATIVE
CLARITY UR: CLEAR
COLOR UR: YELLOW
CREAT UR-MCNC: 46.6 MG/DL
GLUCOSE UR STRIP-MCNC: NEGATIVE MG/DL
HGB UR QL STRIP.AUTO: NEGATIVE
HYALINE CASTS UR QL AUTO: ABNORMAL /LPF
KETONES UR QL STRIP: NEGATIVE
LEUKOCYTE ESTERASE UR QL STRIP.AUTO: ABNORMAL
NITRITE UR QL STRIP: NEGATIVE
PH UR STRIP.AUTO: 6.5 [PH] (ref 5–8)
PROT ?TM UR-MCNC: 87.4 MG/DL
PROT UR QL STRIP: ABNORMAL
RBC # UR STRIP: ABNORMAL /HPF
REF LAB TEST METHOD: ABNORMAL
SP GR UR STRIP: 1.01 (ref 1–1.03)
SQUAMOUS #/AREA URNS HPF: ABNORMAL /HPF
UROBILINOGEN UR QL STRIP: ABNORMAL
WBC # UR STRIP: ABNORMAL /HPF

## 2023-06-07 PROCEDURE — 81001 URINALYSIS AUTO W/SCOPE: CPT

## 2023-06-07 PROCEDURE — 84156 ASSAY OF PROTEIN URINE: CPT

## 2023-06-07 PROCEDURE — 82570 ASSAY OF URINE CREATININE: CPT

## 2023-09-08 ENCOUNTER — LAB (OUTPATIENT)
Dept: LAB | Facility: HOSPITAL | Age: 88
End: 2023-09-08
Payer: MEDICARE

## 2023-09-08 DIAGNOSIS — N18.32 CHRONIC KIDNEY DISEASE (CKD) STAGE G3B/A1, MODERATELY DECREASED GLOMERULAR FILTRATION RATE (GFR) BETWEEN 30-44 ML/MIN/1.73 SQUARE METER AND ALBUMINURIA CREATININE RATIO LESS THAN 30 MG/G (CMS/H*: Primary | ICD-10-CM

## 2023-09-08 LAB
ANION GAP SERPL CALCULATED.3IONS-SCNC: 10.2 MMOL/L (ref 5–15)
BUN SERPL-MCNC: 28 MG/DL (ref 8–23)
BUN/CREAT SERPL: 15.8 (ref 7–25)
CALCIUM SPEC-SCNC: 9.9 MG/DL (ref 8.6–10.5)
CHLORIDE SERPL-SCNC: 104 MMOL/L (ref 98–107)
CO2 SERPL-SCNC: 26.8 MMOL/L (ref 22–29)
CREAT SERPL-MCNC: 1.77 MG/DL (ref 0.76–1.27)
CREAT UR-MCNC: 64.2 MG/DL
EGFRCR SERPLBLD CKD-EPI 2021: 37.2 ML/MIN/1.73
GLUCOSE SERPL-MCNC: 132 MG/DL (ref 65–99)
POTASSIUM SERPL-SCNC: 5.2 MMOL/L (ref 3.5–5.2)
PROT ?TM UR-MCNC: 55.2 MG/DL
SODIUM SERPL-SCNC: 141 MMOL/L (ref 136–145)

## 2023-09-08 PROCEDURE — 84156 ASSAY OF PROTEIN URINE: CPT

## 2023-09-08 PROCEDURE — 36415 COLL VENOUS BLD VENIPUNCTURE: CPT

## 2023-09-08 PROCEDURE — 80048 BASIC METABOLIC PNL TOTAL CA: CPT

## 2023-09-08 PROCEDURE — 82570 ASSAY OF URINE CREATININE: CPT

## 2024-01-17 ENCOUNTER — TRANSCRIBE ORDERS (OUTPATIENT)
Dept: ADMINISTRATIVE | Facility: HOSPITAL | Age: 89
End: 2024-01-17
Payer: MEDICARE

## 2024-01-17 DIAGNOSIS — K42.9 UMBILICAL HERNIA WITHOUT OBSTRUCTION AND WITHOUT GANGRENE: Primary | ICD-10-CM

## 2024-01-25 ENCOUNTER — HOSPITAL ENCOUNTER (OUTPATIENT)
Dept: CT IMAGING | Facility: HOSPITAL | Age: 89
Discharge: HOME | End: 2024-01-25
Admitting: SURGERY
Payer: MEDICARE

## 2024-01-25 DIAGNOSIS — K42.9 UMBILICAL HERNIA WITHOUT OBSTRUCTION AND WITHOUT GANGRENE: ICD-10-CM

## 2024-01-25 PROCEDURE — 74176 CT ABD & PELVIS W/O CONTRAST: CPT

## 2024-02-27 ENCOUNTER — TRANSCRIBE ORDERS (OUTPATIENT)
Dept: LAB | Facility: HOSPITAL | Age: 89
End: 2024-02-27
Payer: MEDICARE

## 2024-02-27 ENCOUNTER — LAB (OUTPATIENT)
Dept: LAB | Facility: HOSPITAL | Age: 89
End: 2024-02-27
Payer: MEDICARE

## 2024-02-27 DIAGNOSIS — N18.32 CHRONIC KIDNEY DISEASE (CKD) STAGE G3B/A1, MODERATELY DECREASED GLOMERULAR FILTRATION RATE (GFR) BETWEEN 30-44 ML/MIN/1.73 SQUARE METER AND ALBUMINURIA CREATININE RATIO LESS THAN 30 MG/G (CMS/H*: ICD-10-CM

## 2024-02-27 DIAGNOSIS — N18.32 CHRONIC KIDNEY DISEASE (CKD) STAGE G3B/A1, MODERATELY DECREASED GLOMERULAR FILTRATION RATE (GFR) BETWEEN 30-44 ML/MIN/1.73 SQUARE METER AND ALBUMINURIA CREATININE RATIO LESS THAN 30 MG/G (CMS/H*: Primary | ICD-10-CM

## 2024-02-27 LAB
ALBUMIN SERPL-MCNC: 4 G/DL (ref 3.5–5.2)
ANION GAP SERPL CALCULATED.3IONS-SCNC: 11.3 MMOL/L (ref 5–15)
BASOPHILS # BLD AUTO: 0.04 10*3/MM3 (ref 0–0.2)
BASOPHILS NFR BLD AUTO: 0.9 % (ref 0–1.5)
BILIRUB UR QL STRIP: NEGATIVE
BUN SERPL-MCNC: 33 MG/DL (ref 8–23)
BUN/CREAT SERPL: 18.9 (ref 7–25)
CALCIUM SPEC-SCNC: 9.4 MG/DL (ref 8.6–10.5)
CHLORIDE SERPL-SCNC: 104 MMOL/L (ref 98–107)
CLARITY UR: CLEAR
CO2 SERPL-SCNC: 23.7 MMOL/L (ref 22–29)
COLOR UR: YELLOW
CREAT SERPL-MCNC: 1.75 MG/DL (ref 0.76–1.27)
DEPRECATED RDW RBC AUTO: 50 FL (ref 37–54)
EGFRCR SERPLBLD CKD-EPI 2021: 37.4 ML/MIN/1.73
EOSINOPHIL # BLD AUTO: 0.18 10*3/MM3 (ref 0–0.4)
EOSINOPHIL NFR BLD AUTO: 3.9 % (ref 0.3–6.2)
ERYTHROCYTE [DISTWIDTH] IN BLOOD BY AUTOMATED COUNT: 14.6 % (ref 12.3–15.4)
GLUCOSE SERPL-MCNC: 99 MG/DL (ref 65–99)
GLUCOSE UR STRIP-MCNC: NEGATIVE MG/DL
HCT VFR BLD AUTO: 39.4 % (ref 37.5–51)
HGB BLD-MCNC: 12.7 G/DL (ref 13–17.7)
HGB UR QL STRIP.AUTO: NEGATIVE
IMM GRANULOCYTES # BLD AUTO: 0.02 10*3/MM3 (ref 0–0.05)
IMM GRANULOCYTES NFR BLD AUTO: 0.4 % (ref 0–0.5)
KETONES UR QL STRIP: NEGATIVE
LEUKOCYTE ESTERASE UR QL STRIP.AUTO: ABNORMAL
LYMPHOCYTES # BLD AUTO: 0.78 10*3/MM3 (ref 0.7–3.1)
LYMPHOCYTES NFR BLD AUTO: 16.8 % (ref 19.6–45.3)
MCH RBC QN AUTO: 30.2 PG (ref 26.6–33)
MCHC RBC AUTO-ENTMCNC: 32.2 G/DL (ref 31.5–35.7)
MCV RBC AUTO: 93.8 FL (ref 79–97)
MONOCYTES # BLD AUTO: 0.41 10*3/MM3 (ref 0.1–0.9)
MONOCYTES NFR BLD AUTO: 8.8 % (ref 5–12)
NEUTROPHILS NFR BLD AUTO: 3.21 10*3/MM3 (ref 1.7–7)
NEUTROPHILS NFR BLD AUTO: 69.2 % (ref 42.7–76)
NITRITE UR QL STRIP: NEGATIVE
NRBC BLD AUTO-RTO: 0 /100 WBC (ref 0–0.2)
PH UR STRIP.AUTO: 6 [PH] (ref 5–8)
PHOSPHATE SERPL-MCNC: 3.3 MG/DL (ref 2.5–4.5)
PLATELET # BLD AUTO: 170 10*3/MM3 (ref 140–450)
PMV BLD AUTO: 11.2 FL (ref 6–12)
POTASSIUM SERPL-SCNC: 4.5 MMOL/L (ref 3.5–5.2)
PROT UR QL STRIP: ABNORMAL
RBC # BLD AUTO: 4.2 10*6/MM3 (ref 4.14–5.8)
SODIUM SERPL-SCNC: 139 MMOL/L (ref 136–145)
SP GR UR STRIP: 1.02 (ref 1–1.03)
UROBILINOGEN UR QL STRIP: ABNORMAL
WBC NRBC COR # BLD AUTO: 4.64 10*3/MM3 (ref 3.4–10.8)

## 2024-02-27 PROCEDURE — 82570 ASSAY OF URINE CREATININE: CPT | Performed by: STUDENT IN AN ORGANIZED HEALTH CARE EDUCATION/TRAINING PROGRAM

## 2024-02-27 PROCEDURE — 81001 URINALYSIS AUTO W/SCOPE: CPT | Performed by: STUDENT IN AN ORGANIZED HEALTH CARE EDUCATION/TRAINING PROGRAM

## 2024-02-27 PROCEDURE — 80069 RENAL FUNCTION PANEL: CPT | Performed by: STUDENT IN AN ORGANIZED HEALTH CARE EDUCATION/TRAINING PROGRAM

## 2024-02-27 PROCEDURE — 85025 COMPLETE CBC W/AUTO DIFF WBC: CPT

## 2024-02-27 PROCEDURE — 84156 ASSAY OF PROTEIN URINE: CPT | Performed by: STUDENT IN AN ORGANIZED HEALTH CARE EDUCATION/TRAINING PROGRAM

## 2024-02-27 PROCEDURE — 36415 COLL VENOUS BLD VENIPUNCTURE: CPT

## 2024-02-28 LAB
BACTERIA UR QL AUTO: ABNORMAL /HPF
CREAT UR-MCNC: 117.9 MG/DL
HYALINE CASTS UR QL AUTO: ABNORMAL /LPF
PROT ?TM UR-MCNC: 175.7 MG/DL
RBC # UR STRIP: ABNORMAL /HPF
REF LAB TEST METHOD: ABNORMAL
SQUAMOUS #/AREA URNS HPF: ABNORMAL /HPF
WBC # UR STRIP: ABNORMAL /HPF

## 2024-08-27 ENCOUNTER — LAB (OUTPATIENT)
Dept: LAB | Facility: HOSPITAL | Age: 89
End: 2024-08-27
Payer: MEDICARE

## 2024-08-27 DIAGNOSIS — N18.32 CHRONIC KIDNEY DISEASE (CKD) STAGE G3B/A1, MODERATELY DECREASED GLOMERULAR FILTRATION RATE (GFR) BETWEEN 30-44 ML/MIN/1.73 SQUARE METER AND ALBUMINURIA CREATININE RATIO LESS THAN 30 MG/G (CMS/H*: Primary | ICD-10-CM

## 2024-08-27 DIAGNOSIS — E11.21 DIABETIC NEPHROPATHY ASSOCIATED WITH TYPE 2 DIABETES MELLITUS: ICD-10-CM

## 2024-08-27 DIAGNOSIS — R80.9 PROTEINURIA, UNSPECIFIED TYPE: ICD-10-CM

## 2024-08-27 LAB
25(OH)D3 SERPL-MCNC: 64.1 NG/ML (ref 30–100)
ALBUMIN SERPL-MCNC: 3.8 G/DL (ref 3.5–5.2)
ANION GAP SERPL CALCULATED.3IONS-SCNC: 9.1 MMOL/L (ref 5–15)
BILIRUB UR QL STRIP: NEGATIVE
BUN SERPL-MCNC: 35 MG/DL (ref 8–23)
BUN/CREAT SERPL: 16.1 (ref 7–25)
CALCIUM SPEC-SCNC: 9.6 MG/DL (ref 8.6–10.5)
CHLORIDE SERPL-SCNC: 106 MMOL/L (ref 98–107)
CLARITY UR: CLEAR
CO2 SERPL-SCNC: 26.9 MMOL/L (ref 22–29)
COLOR UR: YELLOW
CREAT SERPL-MCNC: 2.18 MG/DL (ref 0.76–1.27)
EGFRCR SERPLBLD CKD-EPI 2021: 28.8 ML/MIN/1.73
GLUCOSE SERPL-MCNC: 129 MG/DL (ref 65–99)
GLUCOSE UR STRIP-MCNC: NEGATIVE MG/DL
HBA1C MFR BLD: 6.1 % (ref 4.8–5.6)
HGB UR QL STRIP.AUTO: NEGATIVE
KETONES UR QL STRIP: NEGATIVE
LEUKOCYTE ESTERASE UR QL STRIP.AUTO: NEGATIVE
NITRITE UR QL STRIP: NEGATIVE
PH UR STRIP.AUTO: 5.5 [PH] (ref 5–8)
PHOSPHATE SERPL-MCNC: 3.5 MG/DL (ref 2.5–4.5)
POTASSIUM SERPL-SCNC: 4.1 MMOL/L (ref 3.5–5.2)
PROT UR QL STRIP: ABNORMAL
PTH-INTACT SERPL-MCNC: 65 PG/ML (ref 15–65)
SODIUM SERPL-SCNC: 142 MMOL/L (ref 136–145)
SP GR UR STRIP: 1.02 (ref 1–1.03)
UROBILINOGEN UR QL STRIP: ABNORMAL

## 2024-08-27 PROCEDURE — 82570 ASSAY OF URINE CREATININE: CPT

## 2024-08-27 PROCEDURE — 83036 HEMOGLOBIN GLYCOSYLATED A1C: CPT

## 2024-08-27 PROCEDURE — 81001 URINALYSIS AUTO W/SCOPE: CPT

## 2024-08-27 PROCEDURE — 84165 PROTEIN E-PHORESIS SERUM: CPT

## 2024-08-27 PROCEDURE — 80069 RENAL FUNCTION PANEL: CPT

## 2024-08-27 PROCEDURE — 84155 ASSAY OF PROTEIN SERUM: CPT

## 2024-08-27 PROCEDURE — 82306 VITAMIN D 25 HYDROXY: CPT

## 2024-08-27 PROCEDURE — 84166 PROTEIN E-PHORESIS/URINE/CSF: CPT

## 2024-08-27 PROCEDURE — 84156 ASSAY OF PROTEIN URINE: CPT

## 2024-08-27 PROCEDURE — 36415 COLL VENOUS BLD VENIPUNCTURE: CPT

## 2024-08-27 PROCEDURE — 83970 ASSAY OF PARATHORMONE: CPT

## 2024-08-28 LAB
ALBUMIN SERPL ELPH-MCNC: 3.6 G/DL (ref 2.9–4.4)
ALBUMIN/GLOB SERPL: 1.4 {RATIO} (ref 0.7–1.7)
ALPHA1 GLOB SERPL ELPH-MCNC: 0.2 G/DL (ref 0–0.4)
ALPHA2 GLOB SERPL ELPH-MCNC: 0.6 G/DL (ref 0.4–1)
B-GLOBULIN SERPL ELPH-MCNC: 0.8 G/DL (ref 0.7–1.3)
BACTERIA UR QL AUTO: NORMAL /HPF
CREAT UR-MCNC: 86.5 MG/DL
GAMMA GLOB SERPL ELPH-MCNC: 0.9 G/DL (ref 0.4–1.8)
GLOBULIN SER CALC-MCNC: 2.6 G/DL (ref 2.2–3.9)
HYALINE CASTS UR QL AUTO: NORMAL /LPF
LABORATORY COMMENT REPORT: NORMAL
M PROTEIN SERPL ELPH-MCNC: NORMAL G/DL
PROT ?TM UR-MCNC: 122.3 MG/DL
PROT PATTERN SERPL ELPH-IMP: NORMAL
PROT SERPL-MCNC: 6.2 G/DL (ref 6–8.5)
RBC # UR STRIP: NORMAL /HPF
REF LAB TEST METHOD: NORMAL
SQUAMOUS #/AREA URNS HPF: NORMAL /HPF
WBC # UR STRIP: NORMAL /HPF

## 2024-08-30 LAB
ALBUMIN MFR UR ELPH: 61.9 %
ALPHA1 GLOB MFR UR ELPH: 1.6 %
ALPHA2 GLOB MFR UR ELPH: 7.8 %
B-GLOBULIN MFR UR ELPH: 14.1 %
GAMMA GLOB MFR UR ELPH: 14.7 %
LABORATORY COMMENT REPORT: NORMAL
M PROTEIN MFR UR ELPH: NORMAL %
PROT UR-MCNC: 129.8 MG/DL

## 2024-09-30 ENCOUNTER — LAB (OUTPATIENT)
Dept: LAB | Facility: HOSPITAL | Age: 89
End: 2024-09-30
Payer: MEDICARE

## 2024-09-30 DIAGNOSIS — N18.31 CHRONIC KIDNEY DISEASE (CKD) STAGE G3A/A1, MODERATELY DECREASED GLOMERULAR FILTRATION RATE (GFR) BETWEEN 45-59 ML/MIN/1.73 SQUARE METER AND ALBUMINURIA CREATININE RATIO LESS THAN 30 MG/G (CMS/H*: Primary | ICD-10-CM

## 2024-09-30 LAB
ALBUMIN SERPL-MCNC: 3.8 G/DL (ref 3.5–5.2)
ANION GAP SERPL CALCULATED.3IONS-SCNC: 12 MMOL/L (ref 5–15)
BASOPHILS # BLD AUTO: 0.06 10*3/MM3 (ref 0–0.2)
BASOPHILS NFR BLD AUTO: 1.4 % (ref 0–1.5)
BILIRUB UR QL STRIP: NEGATIVE
BUN SERPL-MCNC: 44 MG/DL (ref 8–23)
BUN/CREAT SERPL: 22.7 (ref 7–25)
CALCIUM SPEC-SCNC: 9.9 MG/DL (ref 8.6–10.5)
CHLORIDE SERPL-SCNC: 104 MMOL/L (ref 98–107)
CLARITY UR: CLEAR
CO2 SERPL-SCNC: 20 MMOL/L (ref 22–29)
COLOR UR: YELLOW
CREAT SERPL-MCNC: 1.94 MG/DL (ref 0.76–1.27)
DEPRECATED RDW RBC AUTO: 58.8 FL (ref 37–54)
EGFRCR SERPLBLD CKD-EPI 2021: 33.1 ML/MIN/1.73
EOSINOPHIL # BLD AUTO: 0.16 10*3/MM3 (ref 0–0.4)
EOSINOPHIL NFR BLD AUTO: 3.8 % (ref 0.3–6.2)
ERYTHROCYTE [DISTWIDTH] IN BLOOD BY AUTOMATED COUNT: 15.8 % (ref 12.3–15.4)
GLUCOSE SERPL-MCNC: 113 MG/DL (ref 65–99)
GLUCOSE UR STRIP-MCNC: NEGATIVE MG/DL
HCT VFR BLD AUTO: 45.1 % (ref 37.5–51)
HGB BLD-MCNC: 14.5 G/DL (ref 13–17.7)
HGB UR QL STRIP.AUTO: NEGATIVE
IMM GRANULOCYTES # BLD AUTO: 0.02 10*3/MM3 (ref 0–0.05)
IMM GRANULOCYTES NFR BLD AUTO: 0.5 % (ref 0–0.5)
KETONES UR QL STRIP: NEGATIVE
LEUKOCYTE ESTERASE UR QL STRIP.AUTO: NEGATIVE
LYMPHOCYTES # BLD AUTO: 0.67 10*3/MM3 (ref 0.7–3.1)
LYMPHOCYTES NFR BLD AUTO: 16 % (ref 19.6–45.3)
MCH RBC QN AUTO: 32.4 PG (ref 26.6–33)
MCHC RBC AUTO-ENTMCNC: 32.2 G/DL (ref 31.5–35.7)
MCV RBC AUTO: 100.9 FL (ref 79–97)
MONOCYTES # BLD AUTO: 0.43 10*3/MM3 (ref 0.1–0.9)
MONOCYTES NFR BLD AUTO: 10.2 % (ref 5–12)
NEUTROPHILS NFR BLD AUTO: 2.86 10*3/MM3 (ref 1.7–7)
NEUTROPHILS NFR BLD AUTO: 68.1 % (ref 42.7–76)
NITRITE UR QL STRIP: NEGATIVE
NRBC BLD AUTO-RTO: 0.5 /100 WBC (ref 0–0.2)
PH UR STRIP.AUTO: 6.5 [PH] (ref 5–8)
PHOSPHATE SERPL-MCNC: 4 MG/DL (ref 2.5–4.5)
PLATELET # BLD AUTO: 168 10*3/MM3 (ref 140–450)
PMV BLD AUTO: 11.6 FL (ref 6–12)
POTASSIUM SERPL-SCNC: 5.1 MMOL/L (ref 3.5–5.2)
PROT UR QL STRIP: ABNORMAL
RBC # BLD AUTO: 4.47 10*6/MM3 (ref 4.14–5.8)
SODIUM SERPL-SCNC: 136 MMOL/L (ref 136–145)
SP GR UR STRIP: 1.01 (ref 1–1.03)
UROBILINOGEN UR QL STRIP: ABNORMAL
WBC NRBC COR # BLD AUTO: 4.2 10*3/MM3 (ref 3.4–10.8)

## 2024-09-30 PROCEDURE — 84156 ASSAY OF PROTEIN URINE: CPT

## 2024-09-30 PROCEDURE — 80069 RENAL FUNCTION PANEL: CPT

## 2024-09-30 PROCEDURE — 82570 ASSAY OF URINE CREATININE: CPT

## 2024-09-30 PROCEDURE — 81001 URINALYSIS AUTO W/SCOPE: CPT

## 2024-09-30 PROCEDURE — 36415 COLL VENOUS BLD VENIPUNCTURE: CPT

## 2024-09-30 PROCEDURE — 85025 COMPLETE CBC W/AUTO DIFF WBC: CPT

## 2024-10-01 LAB
BACTERIA UR QL AUTO: NORMAL /HPF
CREAT UR-MCNC: 27.8 MG/DL
HYALINE CASTS UR QL AUTO: NORMAL /LPF
PROT ?TM UR-MCNC: 67.2 MG/DL
RBC # UR STRIP: NORMAL /HPF
REF LAB TEST METHOD: NORMAL
SQUAMOUS #/AREA URNS HPF: NORMAL /HPF
WBC # UR STRIP: NORMAL /HPF

## 2025-02-04 ENCOUNTER — LAB (OUTPATIENT)
Dept: LAB | Facility: HOSPITAL | Age: 89
End: 2025-02-04
Payer: MEDICARE

## 2025-02-04 ENCOUNTER — TRANSCRIBE ORDERS (OUTPATIENT)
Dept: LAB | Facility: HOSPITAL | Age: 89
End: 2025-02-04
Payer: MEDICARE

## 2025-02-04 DIAGNOSIS — N18.31 CHRONIC KIDNEY DISEASE (CKD) STAGE G3A/A1, MODERATELY DECREASED GLOMERULAR FILTRATION RATE (GFR) BETWEEN 45-59 ML/MIN/1.73 SQUARE METER AND ALBUMINURIA CREATININE RATIO LESS THAN 30 MG/G (CMS/H*: Primary | ICD-10-CM

## 2025-02-04 DIAGNOSIS — N18.31 CHRONIC KIDNEY DISEASE (CKD) STAGE G3A/A1, MODERATELY DECREASED GLOMERULAR FILTRATION RATE (GFR) BETWEEN 45-59 ML/MIN/1.73 SQUARE METER AND ALBUMINURIA CREATININE RATIO LESS THAN 30 MG/G (CMS/H*: ICD-10-CM

## 2025-02-04 LAB
ALBUMIN SERPL-MCNC: 3.7 G/DL (ref 3.5–5.2)
ANION GAP SERPL CALCULATED.3IONS-SCNC: 9.2 MMOL/L (ref 5–15)
BASOPHILS # BLD AUTO: 0.05 10*3/MM3 (ref 0–0.2)
BASOPHILS NFR BLD AUTO: 1 % (ref 0–1.5)
BUN SERPL-MCNC: 49 MG/DL (ref 8–23)
BUN/CREAT SERPL: 19.9 (ref 7–25)
CALCIUM SPEC-SCNC: 9.8 MG/DL (ref 8.6–10.5)
CHLORIDE SERPL-SCNC: 107 MMOL/L (ref 98–107)
CO2 SERPL-SCNC: 24.8 MMOL/L (ref 22–29)
CREAT SERPL-MCNC: 2.46 MG/DL (ref 0.76–1.27)
DEPRECATED RDW RBC AUTO: 48.8 FL (ref 37–54)
EGFRCR SERPLBLD CKD-EPI 2021: 24.7 ML/MIN/1.73
EOSINOPHIL # BLD AUTO: 0.13 10*3/MM3 (ref 0–0.4)
EOSINOPHIL NFR BLD AUTO: 2.6 % (ref 0.3–6.2)
ERYTHROCYTE [DISTWIDTH] IN BLOOD BY AUTOMATED COUNT: 13.5 % (ref 12.3–15.4)
GLUCOSE SERPL-MCNC: 135 MG/DL (ref 65–99)
HCT VFR BLD AUTO: 38 % (ref 37.5–51)
HGB BLD-MCNC: 12.4 G/DL (ref 13–17.7)
IMM GRANULOCYTES # BLD AUTO: 0.02 10*3/MM3 (ref 0–0.05)
IMM GRANULOCYTES NFR BLD AUTO: 0.4 % (ref 0–0.5)
LYMPHOCYTES # BLD AUTO: 0.64 10*3/MM3 (ref 0.7–3.1)
LYMPHOCYTES NFR BLD AUTO: 12.8 % (ref 19.6–45.3)
MCH RBC QN AUTO: 32.5 PG (ref 26.6–33)
MCHC RBC AUTO-ENTMCNC: 32.6 G/DL (ref 31.5–35.7)
MCV RBC AUTO: 99.7 FL (ref 79–97)
MONOCYTES # BLD AUTO: 0.42 10*3/MM3 (ref 0.1–0.9)
MONOCYTES NFR BLD AUTO: 8.4 % (ref 5–12)
NEUTROPHILS NFR BLD AUTO: 3.75 10*3/MM3 (ref 1.7–7)
NEUTROPHILS NFR BLD AUTO: 74.8 % (ref 42.7–76)
NRBC BLD AUTO-RTO: 0 /100 WBC (ref 0–0.2)
PHOSPHATE SERPL-MCNC: 4.3 MG/DL (ref 2.5–4.5)
PLATELET # BLD AUTO: 162 10*3/MM3 (ref 140–450)
PMV BLD AUTO: 10.6 FL (ref 6–12)
POTASSIUM SERPL-SCNC: 5.1 MMOL/L (ref 3.5–5.2)
RBC # BLD AUTO: 3.81 10*6/MM3 (ref 4.14–5.8)
SODIUM SERPL-SCNC: 141 MMOL/L (ref 136–145)
WBC NRBC COR # BLD AUTO: 5.01 10*3/MM3 (ref 3.4–10.8)

## 2025-02-04 PROCEDURE — 81001 URINALYSIS AUTO W/SCOPE: CPT | Performed by: STUDENT IN AN ORGANIZED HEALTH CARE EDUCATION/TRAINING PROGRAM

## 2025-02-04 PROCEDURE — 36415 COLL VENOUS BLD VENIPUNCTURE: CPT

## 2025-02-04 PROCEDURE — 84156 ASSAY OF PROTEIN URINE: CPT

## 2025-02-04 PROCEDURE — 85025 COMPLETE CBC W/AUTO DIFF WBC: CPT

## 2025-02-04 PROCEDURE — 80069 RENAL FUNCTION PANEL: CPT

## 2025-02-04 PROCEDURE — 82570 ASSAY OF URINE CREATININE: CPT

## 2025-02-05 LAB
BACTERIA UR QL AUTO: NORMAL /HPF
BILIRUB UR QL STRIP: NEGATIVE
CLARITY UR: CLEAR
COLOR UR: YELLOW
CREAT UR-MCNC: 73.5 MG/DL
GLUCOSE UR STRIP-MCNC: NEGATIVE MG/DL
HGB UR QL STRIP.AUTO: NEGATIVE
HYALINE CASTS UR QL AUTO: NORMAL /LPF
KETONES UR QL STRIP: NEGATIVE
LEUKOCYTE ESTERASE UR QL STRIP.AUTO: NEGATIVE
NITRITE UR QL STRIP: NEGATIVE
PH UR STRIP.AUTO: 6 [PH] (ref 5–8)
PROT ?TM UR-MCNC: 90.4 MG/DL
PROT UR QL STRIP: ABNORMAL
RBC # UR STRIP: NORMAL /HPF
REF LAB TEST METHOD: NORMAL
SP GR UR STRIP: 1.02 (ref 1–1.03)
SQUAMOUS #/AREA URNS HPF: NORMAL /HPF
UROBILINOGEN UR QL STRIP: ABNORMAL
WBC # UR STRIP: NORMAL /HPF

## (undated) DEVICE — SUT VIC 0 UR6 27IN VCP603H

## (undated) DEVICE — CONTAINER,SPECIMEN,OR STERILE,4OZ: Brand: MEDLINE

## (undated) DEVICE — PK LAP LASR CHOLE 10

## (undated) DEVICE — SUT VIC PLS CTD ANTIB 2/0 18IN VCP111G

## (undated) DEVICE — [HIGH FLOW INSUFFLATOR,  DO NOT USE IF PACKAGE IS DAMAGED,  KEEP DRY,  KEEP AWAY FROM SUNLIGHT,  PROTECT FROM HEAT AND RADIOACTIVE SOURCES.]: Brand: PNEUMOSURE

## (undated) DEVICE — ENDOPATH XCEL BLUNT TIP TROCARS WITH SMOOTH SLEEVES: Brand: ENDOPATH XCEL

## (undated) DEVICE — ENDOPOUCH RETRIEVER SPECIMEN RETRIEVAL BAGS: Brand: ENDOPOUCH RETRIEVER

## (undated) DEVICE — LAPAROSCOPIC SCISSORS: Brand: EPIX LAPAROSCOPIC SCISSORS

## (undated) DEVICE — ENDOPATH XCEL BLADELESS TROCARS WITH STABILITY SLEEVES: Brand: ENDOPATH XCEL

## (undated) DEVICE — JACKSON-PRATT 100CC BULB RESERVOIR: Brand: CARDINAL HEALTH

## (undated) DEVICE — GLV SURG PREMIERPRO MIC LTX PF SZ7.5 BRN

## (undated) DEVICE — INTENDED FOR TISSUE SEPARATION, AND OTHER PROCEDURES THAT REQUIRE A SHARP SURGICAL BLADE TO PUNCTURE OR CUT.: Brand: BARD-PARKER ® STAINLESS STEEL BLADES

## (undated) DEVICE — KT WARM LAP LIQUIDSCOPE/HELPOR W/WARMOR/CLTH 2/TROC/SWAB

## (undated) DEVICE — ENDOPATH XCEL UNIVERSAL TROCAR STABLILITY SLEEVES: Brand: ENDOPATH XCEL

## (undated) DEVICE — MINI ENDOCUT SCISSOR TIP, DISPOSABLE: Brand: RENEW

## (undated) DEVICE — LAPAROSCOPIC SMOKE FILTRATION SYSTEM: Brand: PALL LAPAROSHIELD® PLUS LAPAROSCOPIC SMOKE FILTRATION SYSTEM

## (undated) DEVICE — BLANKT WARM UPPR/BDY ARM/OUT 57X196CM

## (undated) DEVICE — SUT MNCRYL PLS ANTIB UD 4/0 PS2 18IN

## (undated) DEVICE — ENDOPATH 10MM ENDOSCOPIC BLUNT CHERRY DISSECTORS (12 POUCHES CONTAINING 3 DISSECTORS EACH): Brand: ENDOPATH

## (undated) DEVICE — GLV SURG PREMIERPRO MIC LTX PF SZ8 BRN

## (undated) DEVICE — APPL CHLORAPREP TINTED 26ML TEAL

## (undated) DEVICE — SUT PROLN 2/0 PC3 8833H

## (undated) DEVICE — BLAKE SILICONE DRAIN, 15 FR ROUND, HUBLESS WITH 3/16" TROCAR: Brand: BLAKE